# Patient Record
Sex: FEMALE | Race: WHITE | Employment: OTHER | URBAN - METROPOLITAN AREA
[De-identification: names, ages, dates, MRNs, and addresses within clinical notes are randomized per-mention and may not be internally consistent; named-entity substitution may affect disease eponyms.]

---

## 2017-01-03 ENCOUNTER — OFFICE VISIT (OUTPATIENT)
Dept: UROLOGY | Facility: HOSPITAL | Age: 57
End: 2017-01-03
Attending: OBSTETRICS & GYNECOLOGY

## 2017-01-03 VITALS
SYSTOLIC BLOOD PRESSURE: 132 MMHG | WEIGHT: 175 LBS | DIASTOLIC BLOOD PRESSURE: 84 MMHG | BODY MASS INDEX: 27.79 KG/M2 | HEIGHT: 66.5 IN

## 2017-01-03 DIAGNOSIS — N30.10 CHRONIC INTERSTITIAL CYSTITIS: Primary | ICD-10-CM

## 2017-01-03 LAB
BLOOD URINE: NEGATIVE
CONTROL RUN WITHIN 24 HOURS?: YES
LEUKOCYTE ESTERASE URINE: NEGATIVE
NITRITE URINE: NEGATIVE

## 2017-01-03 PROCEDURE — 51700 IRRIGATION OF BLADDER: CPT

## 2017-01-03 PROCEDURE — 81002 URINALYSIS NONAUTO W/O SCOPE: CPT

## 2017-01-03 RX ORDER — 0.9 % SODIUM CHLORIDE 0.9 %
17 VIAL (ML) INJECTION ONCE
Status: COMPLETED | OUTPATIENT
Start: 2017-01-03 | End: 2017-01-03

## 2017-01-03 RX ORDER — HEPARIN SODIUM 10000 [USP'U]/ML
40000 INJECTION, SOLUTION INTRAVENOUS; SUBCUTANEOUS ONCE
Status: COMPLETED | OUTPATIENT
Start: 2017-01-03 | End: 2017-01-03

## 2017-01-03 RX ORDER — LIDOCAINE HYDROCHLORIDE 20 MG/ML
10 JELLY TOPICAL ONCE
Status: COMPLETED | OUTPATIENT
Start: 2017-01-03 | End: 2017-01-03

## 2017-01-03 RX ADMIN — HEPARIN SODIUM 40000 UNITS: 10000 INJECTION, SOLUTION INTRAVENOUS; SUBCUTANEOUS at 14:04:00

## 2017-01-03 RX ADMIN — 0.9 % SODIUM CHLORIDE 17 ML: 0.9 % VIAL (ML) INJECTION at 14:04:00

## 2017-01-03 RX ADMIN — LIDOCAINE HYDROCHLORIDE 10 ML: 20 JELLY TOPICAL at 14:04:00

## 2017-01-03 NOTE — PROCEDURES
.Patient here for instill #3. Patient reports feeling bladder pain d/t travel according to pt. Instill given per protocol. Pt felt she did not need to urinate prior to instill. Patient catheterized for 100 ml. Chemstrip performed.   Patient tolerated pro

## 2017-02-03 ENCOUNTER — TELEPHONE (OUTPATIENT)
Dept: UROLOGY | Facility: HOSPITAL | Age: 57
End: 2017-02-03

## 2017-02-03 NOTE — TELEPHONE ENCOUNTER
Pt called today, she was scheduled for instill. Pt states she has had a low grade fever for 12days. She had foot surgery last month. This is her second foot surgery and her foot became infected then. She saw her foot surgeon [facility] Jan 31.  He said her foot

## 2017-02-07 ENCOUNTER — TELEPHONE (OUTPATIENT)
Dept: UROLOGY | Facility: HOSPITAL | Age: 57
End: 2017-02-07

## 2017-02-10 ENCOUNTER — OFFICE VISIT (OUTPATIENT)
Dept: UROLOGY | Facility: HOSPITAL | Age: 57
End: 2017-02-10
Attending: OBSTETRICS & GYNECOLOGY
Payer: COMMERCIAL

## 2017-02-10 VITALS
SYSTOLIC BLOOD PRESSURE: 130 MMHG | DIASTOLIC BLOOD PRESSURE: 82 MMHG | TEMPERATURE: 98 F | BODY MASS INDEX: 27.79 KG/M2 | HEIGHT: 66.5 IN | WEIGHT: 175 LBS

## 2017-02-10 DIAGNOSIS — N30.10 CHRONIC INTERSTITIAL CYSTITIS: Primary | ICD-10-CM

## 2017-02-10 PROCEDURE — 81002 URINALYSIS NONAUTO W/O SCOPE: CPT

## 2017-02-10 PROCEDURE — 51700 IRRIGATION OF BLADDER: CPT

## 2017-02-10 RX ORDER — 0.9 % SODIUM CHLORIDE 0.9 %
17 VIAL (ML) INJECTION ONCE
Status: COMPLETED | OUTPATIENT
Start: 2017-02-10 | End: 2017-02-10

## 2017-02-10 RX ORDER — LIDOCAINE HYDROCHLORIDE 20 MG/ML
10 JELLY TOPICAL ONCE
Status: COMPLETED | OUTPATIENT
Start: 2017-02-10 | End: 2017-02-10

## 2017-02-10 RX ORDER — HEPARIN SODIUM 10000 [USP'U]/ML
40000 INJECTION, SOLUTION INTRAVENOUS; SUBCUTANEOUS ONCE
Status: COMPLETED | OUTPATIENT
Start: 2017-02-10 | End: 2017-02-10

## 2017-02-10 RX ADMIN — LIDOCAINE HYDROCHLORIDE 10 ML: 20 JELLY TOPICAL at 14:20:00

## 2017-02-10 RX ADMIN — 0.9 % SODIUM CHLORIDE 17 ML: 0.9 % VIAL (ML) INJECTION at 14:20:00

## 2017-02-10 RX ADMIN — HEPARIN SODIUM 40000 UNITS: 10000 INJECTION, SOLUTION INTRAVENOUS; SUBCUTANEOUS at 14:20:00

## 2017-02-10 NOTE — PROCEDURES
.Patient here for rescue instill. Patient reports feeling bladder pain. She thought she had a UTI 2 wks ago, but urine culture was neg. Pt recently had foot surgery and has a boot device on her R foot. She has limited mobility as she cannot bear  Weight.

## 2017-03-03 ENCOUNTER — OFFICE VISIT (OUTPATIENT)
Dept: UROLOGY | Facility: HOSPITAL | Age: 57
End: 2017-03-03
Attending: OBSTETRICS & GYNECOLOGY
Payer: COMMERCIAL

## 2017-03-03 VITALS — BODY MASS INDEX: 27.64 KG/M2 | WEIGHT: 174 LBS | HEIGHT: 66.5 IN | RESPIRATION RATE: 16 BRPM

## 2017-03-03 DIAGNOSIS — N95.2 POSTMENOPAUSAL ATROPHIC VAGINITIS: ICD-10-CM

## 2017-03-03 DIAGNOSIS — R30.0 DYSURIA: ICD-10-CM

## 2017-03-03 DIAGNOSIS — R39.15 URGENCY OF URINATION: ICD-10-CM

## 2017-03-03 DIAGNOSIS — N30.10 CHRONIC INTERSTITIAL CYSTITIS: Primary | ICD-10-CM

## 2017-03-03 PROCEDURE — 81002 URINALYSIS NONAUTO W/O SCOPE: CPT

## 2017-03-03 PROCEDURE — 51700 IRRIGATION OF BLADDER: CPT

## 2017-03-03 RX ORDER — 0.9 % SODIUM CHLORIDE 0.9 %
17 VIAL (ML) INJECTION ONCE
Status: COMPLETED | OUTPATIENT
Start: 2017-03-03 | End: 2017-03-03

## 2017-03-03 RX ORDER — HEPARIN SODIUM 10000 [USP'U]/ML
40000 INJECTION, SOLUTION INTRAVENOUS; SUBCUTANEOUS ONCE
Status: COMPLETED | OUTPATIENT
Start: 2017-03-03 | End: 2017-03-03

## 2017-03-03 RX ORDER — LIDOCAINE HYDROCHLORIDE 20 MG/ML
10 JELLY TOPICAL ONCE
Status: COMPLETED | OUTPATIENT
Start: 2017-03-03 | End: 2017-03-03

## 2017-03-03 RX ADMIN — HEPARIN SODIUM 40000 UNITS: 10000 INJECTION, SOLUTION INTRAVENOUS; SUBCUTANEOUS at 14:16:00

## 2017-03-03 RX ADMIN — 0.9 % SODIUM CHLORIDE 17 ML: 0.9 % VIAL (ML) INJECTION at 14:16:00

## 2017-03-03 RX ADMIN — LIDOCAINE HYDROCHLORIDE 10 ML: 20 JELLY TOPICAL at 14:16:00

## 2017-03-03 NOTE — PROCEDURES
..Patient here for instill . Patient reports feeling continued urgency and frequency. Patient only using vaginal valium prn and has not used Estrace cream for a while, encouraged pt to resume Estrace cream. Instill given per protocol.   Patient catheterize

## 2017-03-16 ENCOUNTER — OFFICE VISIT (OUTPATIENT)
Dept: UROLOGY | Facility: HOSPITAL | Age: 57
End: 2017-03-16
Attending: OBSTETRICS & GYNECOLOGY
Payer: COMMERCIAL

## 2017-03-16 VITALS
BODY MASS INDEX: 27.64 KG/M2 | DIASTOLIC BLOOD PRESSURE: 82 MMHG | HEIGHT: 66.5 IN | SYSTOLIC BLOOD PRESSURE: 122 MMHG | WEIGHT: 174 LBS

## 2017-03-16 DIAGNOSIS — N30.10 CHRONIC INTERSTITIAL CYSTITIS: Primary | ICD-10-CM

## 2017-03-16 PROCEDURE — 81002 URINALYSIS NONAUTO W/O SCOPE: CPT

## 2017-03-16 PROCEDURE — 51700 IRRIGATION OF BLADDER: CPT

## 2017-03-16 RX ORDER — GABAPENTIN 300 MG/1
300 CAPSULE ORAL NIGHTLY
COMMUNITY
End: 2017-04-27

## 2017-03-16 RX ORDER — 0.9 % SODIUM CHLORIDE 0.9 %
17 VIAL (ML) INJECTION ONCE
Status: COMPLETED | OUTPATIENT
Start: 2017-03-16 | End: 2017-03-16

## 2017-03-16 RX ORDER — LIDOCAINE HYDROCHLORIDE 20 MG/ML
10 JELLY TOPICAL ONCE
Status: COMPLETED | OUTPATIENT
Start: 2017-03-16 | End: 2017-03-16

## 2017-03-16 RX ORDER — HEPARIN SODIUM 10000 [USP'U]/ML
40000 INJECTION, SOLUTION INTRAVENOUS; SUBCUTANEOUS ONCE
Status: COMPLETED | OUTPATIENT
Start: 2017-03-16 | End: 2017-03-16

## 2017-03-16 RX ADMIN — HEPARIN SODIUM 40000 UNITS: 10000 INJECTION, SOLUTION INTRAVENOUS; SUBCUTANEOUS at 13:34:00

## 2017-03-16 RX ADMIN — LIDOCAINE HYDROCHLORIDE 10 ML: 20 JELLY TOPICAL at 13:35:00

## 2017-03-16 RX ADMIN — 0.9 % SODIUM CHLORIDE 17 ML: 0.9 % VIAL (ML) INJECTION at 13:35:00

## 2017-03-16 NOTE — PROCEDURES
.Patient here for rescue instill. Patient reports feeling good for 4 -5 days after last instill. Feels she is not emptying completely, otherwise pretty good, suffering from migraine today. Instill given per protocol.   Patient catheterized for a residual

## 2017-03-23 ENCOUNTER — OFFICE VISIT (OUTPATIENT)
Dept: UROLOGY | Facility: HOSPITAL | Age: 57
End: 2017-03-23
Attending: OBSTETRICS & GYNECOLOGY
Payer: COMMERCIAL

## 2017-03-23 VITALS
SYSTOLIC BLOOD PRESSURE: 140 MMHG | HEIGHT: 66.5 IN | DIASTOLIC BLOOD PRESSURE: 80 MMHG | WEIGHT: 174 LBS | BODY MASS INDEX: 27.64 KG/M2

## 2017-03-23 DIAGNOSIS — N30.10 CHRONIC INTERSTITIAL CYSTITIS: Primary | ICD-10-CM

## 2017-03-23 PROCEDURE — 51700 IRRIGATION OF BLADDER: CPT

## 2017-03-23 PROCEDURE — 81002 URINALYSIS NONAUTO W/O SCOPE: CPT

## 2017-03-23 RX ORDER — HEPARIN SODIUM 10000 [USP'U]/ML
40000 INJECTION, SOLUTION INTRAVENOUS; SUBCUTANEOUS ONCE
Status: COMPLETED | OUTPATIENT
Start: 2017-03-23 | End: 2017-03-23

## 2017-03-23 RX ORDER — 0.9 % SODIUM CHLORIDE 0.9 %
17 VIAL (ML) INJECTION ONCE
Status: COMPLETED | OUTPATIENT
Start: 2017-03-23 | End: 2017-03-23

## 2017-03-23 RX ORDER — LIDOCAINE HYDROCHLORIDE 20 MG/ML
10 JELLY TOPICAL ONCE
Status: COMPLETED | OUTPATIENT
Start: 2017-03-23 | End: 2017-03-23

## 2017-03-23 RX ADMIN — HEPARIN SODIUM 40000 UNITS: 10000 INJECTION, SOLUTION INTRAVENOUS; SUBCUTANEOUS at 14:21:00

## 2017-03-23 RX ADMIN — LIDOCAINE HYDROCHLORIDE 10 ML: 20 JELLY TOPICAL at 14:21:00

## 2017-03-23 RX ADMIN — 0.9 % SODIUM CHLORIDE 17 ML: 0.9 % VIAL (ML) INJECTION at 14:21:00

## 2017-03-23 NOTE — PROCEDURES
.Patient here for rescue instill. Patient reports feeling good. Had 5 good days. Instill given per protocol. Patient catheterized for a residual of 50 ml. Chemstrip performed. Patient tolerated procedure well. Next instill scheduled for 1 week.

## 2017-04-03 ENCOUNTER — OFFICE VISIT (OUTPATIENT)
Dept: UROLOGY | Facility: HOSPITAL | Age: 57
End: 2017-04-03
Attending: OBSTETRICS & GYNECOLOGY
Payer: COMMERCIAL

## 2017-04-03 VITALS
WEIGHT: 174 LBS | HEIGHT: 66.5 IN | BODY MASS INDEX: 27.64 KG/M2 | SYSTOLIC BLOOD PRESSURE: 148 MMHG | DIASTOLIC BLOOD PRESSURE: 86 MMHG

## 2017-04-03 DIAGNOSIS — N30.10 INTERSTITIAL CYSTITIS: ICD-10-CM

## 2017-04-03 DIAGNOSIS — N30.10 CHRONIC INTERSTITIAL CYSTITIS: Primary | ICD-10-CM

## 2017-04-03 PROCEDURE — 51700 IRRIGATION OF BLADDER: CPT

## 2017-04-03 PROCEDURE — 81002 URINALYSIS NONAUTO W/O SCOPE: CPT

## 2017-04-03 RX ORDER — 0.9 % SODIUM CHLORIDE 0.9 %
17 VIAL (ML) INJECTION ONCE
Status: COMPLETED | OUTPATIENT
Start: 2017-04-03 | End: 2017-04-03

## 2017-04-03 RX ORDER — LIDOCAINE HYDROCHLORIDE 20 MG/ML
10 JELLY TOPICAL ONCE
Status: COMPLETED | OUTPATIENT
Start: 2017-04-03 | End: 2017-04-03

## 2017-04-03 RX ORDER — HEPARIN SODIUM 10000 [USP'U]/ML
40000 INJECTION, SOLUTION INTRAVENOUS; SUBCUTANEOUS ONCE
Status: COMPLETED | OUTPATIENT
Start: 2017-04-03 | End: 2017-04-03

## 2017-04-03 RX ADMIN — LIDOCAINE HYDROCHLORIDE 10 ML: 20 JELLY TOPICAL at 14:22:00

## 2017-04-03 RX ADMIN — 0.9 % SODIUM CHLORIDE 17 ML: 0.9 % VIAL (ML) INJECTION at 14:22:00

## 2017-04-03 RX ADMIN — HEPARIN SODIUM 40000 UNITS: 10000 INJECTION, SOLUTION INTRAVENOUS; SUBCUTANEOUS at 14:22:00

## 2017-04-03 NOTE — PROCEDURES
.Patient here for rescue instill . Patient reports feeling better last week for several days after instill. Instill given per protocol. Patient catheterized for a residual of 5 ml. Chemstrip performed. Patient tolerated procedure well.   Next instill s

## 2017-04-27 ENCOUNTER — OFFICE VISIT (OUTPATIENT)
Dept: UROLOGY | Facility: HOSPITAL | Age: 57
End: 2017-04-27
Attending: OBSTETRICS & GYNECOLOGY
Payer: COMMERCIAL

## 2017-04-27 VITALS
WEIGHT: 174 LBS | SYSTOLIC BLOOD PRESSURE: 138 MMHG | DIASTOLIC BLOOD PRESSURE: 78 MMHG | HEIGHT: 66.5 IN | BODY MASS INDEX: 27.64 KG/M2

## 2017-04-27 DIAGNOSIS — N30.10 CHRONIC INTERSTITIAL CYSTITIS: Primary | ICD-10-CM

## 2017-04-27 PROCEDURE — 81002 URINALYSIS NONAUTO W/O SCOPE: CPT

## 2017-04-27 PROCEDURE — 51700 IRRIGATION OF BLADDER: CPT

## 2017-04-27 RX ORDER — 0.9 % SODIUM CHLORIDE 0.9 %
17 VIAL (ML) INJECTION ONCE
Status: COMPLETED | OUTPATIENT
Start: 2017-04-27 | End: 2017-04-27

## 2017-04-27 RX ORDER — HEPARIN SODIUM 10000 [USP'U]/ML
40000 INJECTION, SOLUTION INTRAVENOUS; SUBCUTANEOUS ONCE
Status: COMPLETED | OUTPATIENT
Start: 2017-04-27 | End: 2017-04-27

## 2017-04-27 RX ORDER — PROPRANOLOL HYDROCHLORIDE 20 MG/1
20 TABLET ORAL 3 TIMES DAILY PRN
COMMUNITY
End: 2017-08-10

## 2017-04-27 RX ORDER — QUETIAPINE 50 MG/1
50 TABLET, FILM COATED ORAL NIGHTLY
COMMUNITY
End: 2017-06-12

## 2017-04-27 RX ORDER — LIDOCAINE HYDROCHLORIDE 20 MG/ML
10 JELLY TOPICAL ONCE
Status: COMPLETED | OUTPATIENT
Start: 2017-04-27 | End: 2017-04-27

## 2017-04-27 RX ADMIN — 0.9 % SODIUM CHLORIDE 17 ML: 0.9 % VIAL (ML) INJECTION at 13:30:00

## 2017-04-27 RX ADMIN — LIDOCAINE HYDROCHLORIDE 10 ML: 20 JELLY TOPICAL at 13:30:00

## 2017-04-27 RX ADMIN — HEPARIN SODIUM 40000 UNITS: 10000 INJECTION, SOLUTION INTRAVENOUS; SUBCUTANEOUS at 13:30:00

## 2017-04-27 NOTE — PROCEDURES
.Patient here for rescue instill. Patient reports feeling her continued chronic pain. Instill given per protocol. Patient catheterized for a residual of 5 ml. Chemstrip performed. Patient tolerated procedure well. Next instill scheduled for 2 weeks.

## 2017-05-08 ENCOUNTER — LAB ENCOUNTER (OUTPATIENT)
Dept: LAB | Age: 57
End: 2017-05-08
Attending: FAMILY MEDICINE
Payer: COMMERCIAL

## 2017-05-08 ENCOUNTER — OFFICE VISIT (OUTPATIENT)
Dept: FAMILY MEDICINE CLINIC | Facility: CLINIC | Age: 57
End: 2017-05-08

## 2017-05-08 VITALS
TEMPERATURE: 98 F | HEART RATE: 106 BPM | HEIGHT: 65.5 IN | RESPIRATION RATE: 16 BRPM | SYSTOLIC BLOOD PRESSURE: 144 MMHG | BODY MASS INDEX: 30.29 KG/M2 | OXYGEN SATURATION: 97 % | DIASTOLIC BLOOD PRESSURE: 80 MMHG | WEIGHT: 184 LBS

## 2017-05-08 DIAGNOSIS — Z13.1 SCREENING FOR DIABETES MELLITUS: ICD-10-CM

## 2017-05-08 DIAGNOSIS — N30.10 CHRONIC INTERSTITIAL CYSTITIS: ICD-10-CM

## 2017-05-08 DIAGNOSIS — Z13.21 ENCOUNTER FOR VITAMIN DEFICIENCY SCREENING: ICD-10-CM

## 2017-05-08 DIAGNOSIS — F41.9 ANXIETY: ICD-10-CM

## 2017-05-08 DIAGNOSIS — G43.009 MIGRAINE WITHOUT AURA AND WITHOUT STATUS MIGRAINOSUS, NOT INTRACTABLE: ICD-10-CM

## 2017-05-08 DIAGNOSIS — Z13.220 SCREENING, LIPID: ICD-10-CM

## 2017-05-08 DIAGNOSIS — M25.549 PAIN IN MULTIPLE FINGER JOINTS: ICD-10-CM

## 2017-05-08 DIAGNOSIS — M25.549 PAIN IN MULTIPLE FINGER JOINTS: Primary | ICD-10-CM

## 2017-05-08 DIAGNOSIS — Z13.0 SCREENING, ANEMIA, DEFICIENCY, IRON: ICD-10-CM

## 2017-05-08 DIAGNOSIS — E89.0 POST-SURGICAL HYPOTHYROIDISM: ICD-10-CM

## 2017-05-08 DIAGNOSIS — Z00.00 BLOOD TESTS FOR ROUTINE GENERAL PHYSICAL EXAMINATION: ICD-10-CM

## 2017-05-08 PROCEDURE — 99214 OFFICE O/P EST MOD 30 MIN: CPT | Performed by: FAMILY MEDICINE

## 2017-05-08 PROCEDURE — 82306 VITAMIN D 25 HYDROXY: CPT | Performed by: FAMILY MEDICINE

## 2017-05-08 PROCEDURE — 82607 VITAMIN B-12: CPT | Performed by: FAMILY MEDICINE

## 2017-05-08 PROCEDURE — 86140 C-REACTIVE PROTEIN: CPT | Performed by: FAMILY MEDICINE

## 2017-05-08 PROCEDURE — 80050 GENERAL HEALTH PANEL: CPT | Performed by: FAMILY MEDICINE

## 2017-05-08 PROCEDURE — 85652 RBC SED RATE AUTOMATED: CPT | Performed by: FAMILY MEDICINE

## 2017-05-08 PROCEDURE — 36415 COLL VENOUS BLD VENIPUNCTURE: CPT | Performed by: FAMILY MEDICINE

## 2017-05-08 PROCEDURE — 84439 ASSAY OF FREE THYROXINE: CPT | Performed by: FAMILY MEDICINE

## 2017-05-08 PROCEDURE — 86200 CCP ANTIBODY: CPT | Performed by: FAMILY MEDICINE

## 2017-05-08 PROCEDURE — 86038 ANTINUCLEAR ANTIBODIES: CPT | Performed by: FAMILY MEDICINE

## 2017-05-08 PROCEDURE — 86431 RHEUMATOID FACTOR QUANT: CPT | Performed by: FAMILY MEDICINE

## 2017-05-08 NOTE — PROGRESS NOTES
Sherice Henning is a 65year old female. S:  Patient presents today with the following concerns:  · Depressed. Seeing therapist and psychiatrist.  Is tearful.  was just in hospital with intestinal blockage. Having marital issues.   Youngest son le tongue daily as needed.  Indications: Interstitial cystitis Disp:  Rfl:      Patient Active Problem List:     Chronic interstitial cystitis     HTN (hypertension)     Urinary urgency     Post-surgical hypothyroidism     Migraines     Asthma     Menopausal s palpation. Normal ROM. Right 5th digit DIP is not swollen but is tender to palpation. Squeeze test is negative.     EXTREMITIES: no edema  NEURO: Oriented times three,cranial nerves are intact,motor and sensory are grossly intact    ASSESSMENT AND PLAN: [phone]

## 2017-05-10 ENCOUNTER — OFFICE VISIT (OUTPATIENT)
Dept: UROLOGY | Facility: HOSPITAL | Age: 57
End: 2017-05-10
Attending: OBSTETRICS & GYNECOLOGY
Payer: COMMERCIAL

## 2017-05-10 VITALS
DIASTOLIC BLOOD PRESSURE: 88 MMHG | SYSTOLIC BLOOD PRESSURE: 130 MMHG | WEIGHT: 184 LBS | HEIGHT: 65.5 IN | BODY MASS INDEX: 30.29 KG/M2

## 2017-05-10 DIAGNOSIS — N30.10 CHRONIC INTERSTITIAL CYSTITIS: Primary | ICD-10-CM

## 2017-05-10 PROCEDURE — 51700 IRRIGATION OF BLADDER: CPT

## 2017-05-10 PROCEDURE — 81002 URINALYSIS NONAUTO W/O SCOPE: CPT

## 2017-05-10 RX ORDER — 0.9 % SODIUM CHLORIDE 0.9 %
17 VIAL (ML) INJECTION ONCE
Status: COMPLETED | OUTPATIENT
Start: 2017-05-10 | End: 2017-05-10

## 2017-05-10 RX ORDER — HEPARIN SODIUM 10000 [USP'U]/ML
40000 INJECTION, SOLUTION INTRAVENOUS; SUBCUTANEOUS ONCE
Status: COMPLETED | OUTPATIENT
Start: 2017-05-10 | End: 2017-05-10

## 2017-05-10 RX ORDER — LIDOCAINE HYDROCHLORIDE 20 MG/ML
10 JELLY TOPICAL ONCE
Status: COMPLETED | OUTPATIENT
Start: 2017-05-10 | End: 2017-05-10

## 2017-05-10 RX ADMIN — HEPARIN SODIUM 40000 UNITS: 10000 INJECTION, SOLUTION INTRAVENOUS; SUBCUTANEOUS at 13:45:00

## 2017-05-10 RX ADMIN — LIDOCAINE HYDROCHLORIDE 10 ML: 20 JELLY TOPICAL at 13:45:00

## 2017-05-10 RX ADMIN — 0.9 % SODIUM CHLORIDE 17 ML: 0.9 % VIAL (ML) INJECTION at 13:45:00

## 2017-05-10 NOTE — PROCEDURES
.Patient here for instill. Patient reports feeling . Instill given per protocol. Patient catheterized for a residual of 100ml, but pt hadn't emptied prior. Chemstrip performed. Patient tolerated procedure well. Next instill scheduled for PRN basis.  P

## 2017-05-12 ENCOUNTER — OFFICE VISIT (OUTPATIENT)
Dept: FAMILY MEDICINE CLINIC | Facility: CLINIC | Age: 57
End: 2017-05-12

## 2017-05-12 VITALS
DIASTOLIC BLOOD PRESSURE: 90 MMHG | BODY MASS INDEX: 30.84 KG/M2 | HEIGHT: 65.5 IN | WEIGHT: 187.38 LBS | TEMPERATURE: 98 F | RESPIRATION RATE: 18 BRPM | SYSTOLIC BLOOD PRESSURE: 140 MMHG | OXYGEN SATURATION: 97 % | HEART RATE: 107 BPM

## 2017-05-12 DIAGNOSIS — M79.645 PAIN IN FINGER OF BOTH HANDS: ICD-10-CM

## 2017-05-12 DIAGNOSIS — Z12.11 ENCOUNTER FOR SCREENING COLONOSCOPY: ICD-10-CM

## 2017-05-12 DIAGNOSIS — M79.644 PAIN IN FINGER OF BOTH HANDS: ICD-10-CM

## 2017-05-12 DIAGNOSIS — Z01.419 WELL WOMAN EXAM: Primary | ICD-10-CM

## 2017-05-12 DIAGNOSIS — Z12.31 ENCOUNTER FOR SCREENING MAMMOGRAM FOR BREAST CANCER: ICD-10-CM

## 2017-05-12 PROCEDURE — 99396 PREV VISIT EST AGE 40-64: CPT | Performed by: FAMILY MEDICINE

## 2017-05-12 NOTE — PROGRESS NOTES
:HPI:   Danyell Jimenes is a 65year old female who presents for a complete physical exam.     Patient complains of     Sees psychiatrist Dr. Phoebe Kohli with [facility] in [facility] once a month-next appt 5/23.    Dr. Kimmie Irwin is her therapist (SUBOXONE) 8-2 MG Sublingual SL Tab Place 1 tablet under the tongue daily as needed.  Indications: Interstitial cystitis Disp:  Rfl:       Past Medical History   Diagnosis Date   • HYPERTENSION    • Papillary thyroid carcinoma ([facility] [address] 2007   • DEPRESSION    • Alcohol Use: No              Occ: homemaker, used to work for [employer]. : . Children: 2 sons. Exercise: biking. Riding stationary bike right now. Due to issues with foot surgery. Diet: fruits. Not good about getting protein in. and sensory are grossly intact    CBC  (most recent labs)     Lab Results  Component Value Date/Time   WBC 8.0 05/08/2017 03:15 PM   HGB 12.8 05/08/2017 03:15 PM   HCT 39.9 05/08/2017 03:15 PM   .0 05/08/2017 03:15 PM         CMP  (most recent labs) patient indicates understanding of these issues and agrees to the plan. The patient is asked to return for CPX annually and sooner if needed.

## 2017-05-16 ENCOUNTER — TELEPHONE (OUTPATIENT)
Dept: UROLOGY | Facility: HOSPITAL | Age: 57
End: 2017-05-16

## 2017-05-16 NOTE — TELEPHONE ENCOUNTER
Pt called  Requesting we send her letter for insurance approval for Botox.   Spoke to Ester atkinson RN, who states that paperwork was submitted May 12 th or  15th at the latest, our office spoke with Susana Nuñez who was contacting [facility] [phone] to put pre-certification in w

## 2017-05-17 ENCOUNTER — HOSPITAL ENCOUNTER (OUTPATIENT)
Dept: GENERAL RADIOLOGY | Facility: HOSPITAL | Age: 57
Discharge: HOME | End: 2017-05-17
Attending: FAMILY MEDICINE
Payer: COMMERCIAL

## 2017-05-17 DIAGNOSIS — M79.644 PAIN IN FINGER OF BOTH HANDS: ICD-10-CM

## 2017-05-17 DIAGNOSIS — M79.645 PAIN IN FINGER OF BOTH HANDS: ICD-10-CM

## 2017-05-17 PROCEDURE — 73130 X-RAY EXAM OF HAND: CPT | Performed by: FAMILY MEDICINE

## 2017-05-18 ENCOUNTER — OFFICE VISIT (OUTPATIENT)
Dept: UROLOGY | Facility: HOSPITAL | Age: 57
End: 2017-05-18
Attending: OBSTETRICS & GYNECOLOGY
Payer: COMMERCIAL

## 2017-05-18 VITALS
BODY MASS INDEX: 30.78 KG/M2 | WEIGHT: 187 LBS | SYSTOLIC BLOOD PRESSURE: 140 MMHG | HEIGHT: 65.5 IN | DIASTOLIC BLOOD PRESSURE: 82 MMHG

## 2017-05-18 DIAGNOSIS — M79.641 PAIN IN BOTH HANDS: ICD-10-CM

## 2017-05-18 DIAGNOSIS — M79.642 PAIN IN BOTH HANDS: ICD-10-CM

## 2017-05-18 DIAGNOSIS — M19.90 ARTHRITIS: Primary | ICD-10-CM

## 2017-05-18 DIAGNOSIS — R39.15 URGENCY OF URINATION: ICD-10-CM

## 2017-05-18 DIAGNOSIS — N30.10 CHRONIC INTERSTITIAL CYSTITIS: Primary | ICD-10-CM

## 2017-05-18 DIAGNOSIS — N95.2 POSTMENOPAUSAL ATROPHIC VAGINITIS: ICD-10-CM

## 2017-05-18 PROCEDURE — 81002 URINALYSIS NONAUTO W/O SCOPE: CPT

## 2017-05-18 PROCEDURE — 51700 IRRIGATION OF BLADDER: CPT

## 2017-05-18 RX ORDER — 0.9 % SODIUM CHLORIDE 0.9 %
17 VIAL (ML) INJECTION ONCE
Status: COMPLETED | OUTPATIENT
Start: 2017-05-18 | End: 2017-05-18

## 2017-05-18 RX ORDER — LIDOCAINE HYDROCHLORIDE 20 MG/ML
10 JELLY TOPICAL ONCE
Status: COMPLETED | OUTPATIENT
Start: 2017-05-18 | End: 2017-05-18

## 2017-05-18 RX ORDER — ESTRADIOL 0.1 MG/G
CREAM VAGINAL
Qty: 1 TUBE | Refills: 3 | Status: SHIPPED | OUTPATIENT
Start: 2017-05-18 | End: 2019-01-02

## 2017-05-18 RX ORDER — HEPARIN SODIUM 10000 [USP'U]/ML
40000 INJECTION, SOLUTION INTRAVENOUS; SUBCUTANEOUS ONCE
Status: COMPLETED | OUTPATIENT
Start: 2017-05-18 | End: 2017-05-18

## 2017-05-18 RX ADMIN — 0.9 % SODIUM CHLORIDE 17 ML: 0.9 % VIAL (ML) INJECTION at 14:00:00

## 2017-05-18 RX ADMIN — HEPARIN SODIUM 40000 UNITS: 10000 INJECTION, SOLUTION INTRAVENOUS; SUBCUTANEOUS at 14:00:00

## 2017-05-18 RX ADMIN — LIDOCAINE HYDROCHLORIDE 10 ML: 20 JELLY TOPICAL at 14:00:00

## 2017-05-18 NOTE — PROCEDURES
.Patient here for instill. Patient reports feeling dehydrated and having bladder pain. Instill given per protocol. Patient catheterized for a residual of 2 ml. Chemstrip performed. Patient tolerated procedure well.   Next instill scheduled for next wee

## 2017-05-25 ENCOUNTER — OFFICE VISIT (OUTPATIENT)
Dept: UROLOGY | Facility: HOSPITAL | Age: 57
End: 2017-05-25
Attending: OBSTETRICS & GYNECOLOGY
Payer: COMMERCIAL

## 2017-05-25 VITALS
DIASTOLIC BLOOD PRESSURE: 78 MMHG | HEIGHT: 65.5 IN | WEIGHT: 187 LBS | BODY MASS INDEX: 30.78 KG/M2 | SYSTOLIC BLOOD PRESSURE: 135 MMHG

## 2017-05-25 DIAGNOSIS — N95.2 POSTMENOPAUSAL ATROPHIC VAGINITIS: ICD-10-CM

## 2017-05-25 DIAGNOSIS — N30.10 CHRONIC INTERSTITIAL CYSTITIS: Primary | ICD-10-CM

## 2017-05-25 PROCEDURE — 81002 URINALYSIS NONAUTO W/O SCOPE: CPT

## 2017-05-25 PROCEDURE — 51700 IRRIGATION OF BLADDER: CPT

## 2017-05-25 RX ORDER — HEPARIN SODIUM 10000 [USP'U]/ML
40000 INJECTION, SOLUTION INTRAVENOUS; SUBCUTANEOUS ONCE
Status: COMPLETED | OUTPATIENT
Start: 2017-05-25 | End: 2017-05-25

## 2017-05-25 RX ORDER — LIDOCAINE HYDROCHLORIDE 20 MG/ML
10 JELLY TOPICAL ONCE
Status: COMPLETED | OUTPATIENT
Start: 2017-05-25 | End: 2017-05-25

## 2017-05-25 RX ORDER — 0.9 % SODIUM CHLORIDE 0.9 %
17 VIAL (ML) INJECTION ONCE
Status: COMPLETED | OUTPATIENT
Start: 2017-05-25 | End: 2017-05-25

## 2017-05-25 RX ADMIN — HEPARIN SODIUM 40000 UNITS: 10000 INJECTION, SOLUTION INTRAVENOUS; SUBCUTANEOUS at 14:00:00

## 2017-05-25 RX ADMIN — 0.9 % SODIUM CHLORIDE 17 ML: 0.9 % VIAL (ML) INJECTION at 14:00:00

## 2017-05-25 RX ADMIN — LIDOCAINE HYDROCHLORIDE 10 ML: 20 JELLY TOPICAL at 14:00:00

## 2017-05-25 NOTE — PROCEDURES
.Patient here for instill. Patient reports feeling same ongoing bladder and vaginal pain. Instill given per protocol. Patient catheterized for a residual of 3 ml. Chemstrip performed. Patient tolerated procedure well.   Next instill scheduled for 1 wee

## 2017-06-02 ENCOUNTER — OFFICE VISIT (OUTPATIENT)
Dept: UROLOGY | Facility: HOSPITAL | Age: 57
End: 2017-06-02
Attending: OBSTETRICS & GYNECOLOGY
Payer: COMMERCIAL

## 2017-06-02 VITALS — HEIGHT: 65.5 IN | BODY MASS INDEX: 30.78 KG/M2 | WEIGHT: 187 LBS

## 2017-06-02 DIAGNOSIS — N30.10 CHRONIC INTERSTITIAL CYSTITIS: Primary | ICD-10-CM

## 2017-06-02 PROCEDURE — 51700 IRRIGATION OF BLADDER: CPT

## 2017-06-02 PROCEDURE — 81002 URINALYSIS NONAUTO W/O SCOPE: CPT

## 2017-06-02 RX ORDER — HEPARIN SODIUM 10000 [USP'U]/ML
40000 INJECTION, SOLUTION INTRAVENOUS; SUBCUTANEOUS ONCE
Status: COMPLETED | OUTPATIENT
Start: 2017-06-02 | End: 2017-06-02

## 2017-06-02 RX ORDER — 0.9 % SODIUM CHLORIDE 0.9 %
17 VIAL (ML) INJECTION ONCE
Status: COMPLETED | OUTPATIENT
Start: 2017-06-02 | End: 2017-06-02

## 2017-06-02 RX ORDER — LIDOCAINE HYDROCHLORIDE 20 MG/ML
10 JELLY TOPICAL ONCE
Status: COMPLETED | OUTPATIENT
Start: 2017-06-02 | End: 2017-06-02

## 2017-06-02 RX ADMIN — LIDOCAINE HYDROCHLORIDE 10 ML: 20 JELLY TOPICAL at 14:00:00

## 2017-06-02 RX ADMIN — 0.9 % SODIUM CHLORIDE 17 ML: 0.9 % VIAL (ML) INJECTION at 14:00:00

## 2017-06-02 RX ADMIN — HEPARIN SODIUM 40000 UNITS: 10000 INJECTION, SOLUTION INTRAVENOUS; SUBCUTANEOUS at 14:00:00

## 2017-06-02 NOTE — PROCEDURES
.Patient here for instill. Patient reports feeling same pain. Instill given per protocol. Patient catheterized for a residual of 3 ml. Chemstrip performed. Patient tolerated procedure well. Next instill scheduled for 1 1/2 wks.

## 2017-06-08 ENCOUNTER — OFFICE VISIT (OUTPATIENT)
Dept: FAMILY MEDICINE CLINIC | Facility: CLINIC | Age: 57
End: 2017-06-08

## 2017-06-08 ENCOUNTER — TELEPHONE (OUTPATIENT)
Dept: FAMILY MEDICINE CLINIC | Facility: CLINIC | Age: 57
End: 2017-06-08

## 2017-06-08 VITALS
TEMPERATURE: 98 F | RESPIRATION RATE: 16 BRPM | DIASTOLIC BLOOD PRESSURE: 98 MMHG | SYSTOLIC BLOOD PRESSURE: 150 MMHG | HEART RATE: 88 BPM

## 2017-06-08 DIAGNOSIS — J45.20 MILD INTERMITTENT ASTHMA WITHOUT COMPLICATION: ICD-10-CM

## 2017-06-08 DIAGNOSIS — R11.0 NAUSEA: ICD-10-CM

## 2017-06-08 DIAGNOSIS — R42 DIZZINESS: Primary | ICD-10-CM

## 2017-06-08 DIAGNOSIS — G43.009 MIGRAINE WITHOUT AURA AND WITHOUT STATUS MIGRAINOSUS, NOT INTRACTABLE: ICD-10-CM

## 2017-06-08 PROCEDURE — 99214 OFFICE O/P EST MOD 30 MIN: CPT | Performed by: PHYSICIAN ASSISTANT

## 2017-06-08 RX ORDER — ONDANSETRON 4 MG/1
4 TABLET, FILM COATED ORAL EVERY 8 HOURS PRN
Qty: 20 TABLET | Refills: 0 | Status: SHIPPED | OUTPATIENT
Start: 2017-06-08 | End: 2017-08-10

## 2017-06-08 RX ORDER — MECLIZINE HYDROCHLORIDE 25 MG/1
25 TABLET ORAL 3 TIMES DAILY PRN
Qty: 21 TABLET | Refills: 0 | Status: SHIPPED | OUTPATIENT
Start: 2017-06-08 | End: 2017-08-10

## 2017-06-08 NOTE — PROGRESS NOTES
CC:  Patient presents with:  Dizziness: Began after getting up from lying down yesterday afternoon. Room is spinning. Has been constant since then. Migraine: HA beginning last night.   Asthma: ACT Score 25/25  AAP updated and pended  Does not have a rescue only  Fentanyl                  Hydrocodone                 Comment:Caused significant depressive symptoms  Pcn [Penicillamine]     Rash  Verapamil                   Comment:Migraine  Current Meds:    Current Outpatient Prescriptions on File Prior to Visit palpitations; no peripheral edema   Gastrointestinal: Normal bowels; no abdominal pain  Genitourinary:  Normal urination; no hematuria or urgency/frequency   Musculoskeletal: No pain/weakness in arms/legs; normal range of motion   Skin: No rashes or new sk effects of the medication I prescribed. I advised her to seek immediate attention in the ER for intractable vomiting, severe dizziness, severe HA, or other neurological red flags. Return in 3-4 days if symptoms; sooner as needed.   I will refer her to ENT

## 2017-06-08 NOTE — TELEPHONE ENCOUNTER
Pt reports waking up with dizziness x 2 days. Feels a little better after eating. No h/o feeling dizzy. No other sx. Appt given for today with Tee Lew PA-C. Patient verbalized understanding and agrees with plan.

## 2017-06-08 NOTE — PATIENT INSTRUCTIONS
Dizziness (Vertigo) and Balance Problems: Staying Safe     Replace burned-out lightbulbs to keep your home safe and well lit. Falls or accidents can lead to pain, broken bones, and fear of future falls.  Protect yourself and others by preparing for epis · Take public transportation. · Walk to stores and other places when you can. Asking for help  Don't be afraid to ask for help running errands, cooking meals, and doing exercise.  Whether it's a friend, loved one, neighbor, or stranger on the street, eliezer echevarria Syncope is fainting that happens when the brain doesn’t get enough oxygen-rich blood. It can be caused by low heart rate or low blood pressure. This is called vasovagal syncope. It can also be caused by sitting or standing up too quickly.  This is called or

## 2017-06-12 ENCOUNTER — OFFICE VISIT (OUTPATIENT)
Dept: UROLOGY | Facility: HOSPITAL | Age: 57
End: 2017-06-12
Attending: OBSTETRICS & GYNECOLOGY
Payer: COMMERCIAL

## 2017-06-12 VITALS
HEIGHT: 65.5 IN | BODY MASS INDEX: 30.78 KG/M2 | SYSTOLIC BLOOD PRESSURE: 140 MMHG | DIASTOLIC BLOOD PRESSURE: 86 MMHG | WEIGHT: 187 LBS

## 2017-06-12 DIAGNOSIS — N30.10 CHRONIC INTERSTITIAL CYSTITIS: Primary | ICD-10-CM

## 2017-06-12 PROCEDURE — 81002 URINALYSIS NONAUTO W/O SCOPE: CPT

## 2017-06-12 PROCEDURE — 51700 IRRIGATION OF BLADDER: CPT

## 2017-06-12 RX ORDER — HEPARIN SODIUM 10000 [USP'U]/ML
40000 INJECTION, SOLUTION INTRAVENOUS; SUBCUTANEOUS ONCE
Status: COMPLETED | OUTPATIENT
Start: 2017-06-12 | End: 2017-06-12

## 2017-06-12 RX ORDER — TRAZODONE HYDROCHLORIDE 50 MG/1
50 TABLET ORAL NIGHTLY
COMMUNITY
End: 2018-11-30

## 2017-06-12 RX ORDER — DESVENLAFAXINE 50 MG/1
50 TABLET, EXTENDED RELEASE ORAL DAILY
COMMUNITY
End: 2017-08-10

## 2017-06-12 RX ORDER — 0.9 % SODIUM CHLORIDE 0.9 %
17 VIAL (ML) INJECTION ONCE
Status: COMPLETED | OUTPATIENT
Start: 2017-06-12 | End: 2017-06-12

## 2017-06-12 RX ORDER — LIDOCAINE HYDROCHLORIDE 20 MG/ML
10 JELLY TOPICAL ONCE
Status: COMPLETED | OUTPATIENT
Start: 2017-06-12 | End: 2017-06-12

## 2017-06-12 RX ADMIN — 0.9 % SODIUM CHLORIDE 17 ML: 0.9 % VIAL (ML) INJECTION at 14:10:00

## 2017-06-12 RX ADMIN — LIDOCAINE HYDROCHLORIDE 10 ML: 20 JELLY TOPICAL at 14:10:00

## 2017-06-12 RX ADMIN — HEPARIN SODIUM 40000 UNITS: 10000 INJECTION, SOLUTION INTRAVENOUS; SUBCUTANEOUS at 14:10:00

## 2017-06-12 NOTE — PROCEDURES
.Patient here for instill. Patient reports feeling better this week. Instill given per protocol. Patient catheterized for a residual of 5 ml. Chemstrip performed. Patient tolerated procedure well. Next instill scheduled PRN.

## 2017-06-19 ENCOUNTER — TELEPHONE (OUTPATIENT)
Dept: FAMILY MEDICINE CLINIC | Facility: CLINIC | Age: 57
End: 2017-06-19

## 2017-06-21 DIAGNOSIS — I10 ESSENTIAL HYPERTENSION: Primary | ICD-10-CM

## 2017-06-21 RX ORDER — LIOTHYRONINE SODIUM 5 MCG
TABLET ORAL
Qty: 30 TABLET | Refills: 2 | Status: SHIPPED | OUTPATIENT
Start: 2017-06-21 | End: 2017-09-18

## 2017-07-06 ENCOUNTER — MED REC SCAN ONLY (OUTPATIENT)
Dept: FAMILY MEDICINE CLINIC | Facility: CLINIC | Age: 57
End: 2017-07-06

## 2017-08-10 ENCOUNTER — TELEPHONE (OUTPATIENT)
Dept: UROLOGY | Facility: HOSPITAL | Age: 57
End: 2017-08-10

## 2017-08-10 ENCOUNTER — OFFICE VISIT (OUTPATIENT)
Dept: UROLOGY | Facility: HOSPITAL | Age: 57
End: 2017-08-10
Attending: OBSTETRICS & GYNECOLOGY
Payer: COMMERCIAL

## 2017-08-10 VITALS
SYSTOLIC BLOOD PRESSURE: 124 MMHG | BODY MASS INDEX: 30.78 KG/M2 | HEIGHT: 65.5 IN | WEIGHT: 187 LBS | DIASTOLIC BLOOD PRESSURE: 84 MMHG

## 2017-08-10 DIAGNOSIS — N95.2 POSTMENOPAUSAL ATROPHIC VAGINITIS: Primary | ICD-10-CM

## 2017-08-10 DIAGNOSIS — N30.10 INTERSTITIAL CYSTITIS: ICD-10-CM

## 2017-08-10 PROCEDURE — 81002 URINALYSIS NONAUTO W/O SCOPE: CPT

## 2017-08-10 PROCEDURE — 51700 IRRIGATION OF BLADDER: CPT

## 2017-08-10 RX ORDER — HEPARIN SODIUM 10000 [USP'U]/ML
40000 INJECTION, SOLUTION INTRAVENOUS; SUBCUTANEOUS ONCE
Status: COMPLETED | OUTPATIENT
Start: 2017-08-10 | End: 2017-08-10

## 2017-08-10 RX ORDER — LIDOCAINE HYDROCHLORIDE 20 MG/ML
10 JELLY TOPICAL ONCE
Status: COMPLETED | OUTPATIENT
Start: 2017-08-10 | End: 2017-08-10

## 2017-08-10 RX ORDER — 0.9 % SODIUM CHLORIDE 0.9 %
17 VIAL (ML) INJECTION ONCE
Status: COMPLETED | OUTPATIENT
Start: 2017-08-10 | End: 2017-08-10

## 2017-08-10 RX ADMIN — HEPARIN SODIUM 40000 UNITS: 10000 INJECTION, SOLUTION INTRAVENOUS; SUBCUTANEOUS at 13:30:00

## 2017-08-10 RX ADMIN — 0.9 % SODIUM CHLORIDE 17 ML: 0.9 % VIAL (ML) INJECTION at 13:30:00

## 2017-08-10 RX ADMIN — LIDOCAINE HYDROCHLORIDE 10 ML: 20 JELLY TOPICAL at 13:30:00

## 2017-08-10 NOTE — PROCEDURES
.Patient here for rescue instill. Patient reports feeling a lot of cramping pain last few weeks while on vacation, forgot to bring her vaginal valium supp. Used x2 this week with small relief.   Pt is awaiting denial letter from insurance company about alverto

## 2017-08-10 NOTE — TELEPHONE ENCOUNTER
GEMINI Jean, for Vaginal valium suppository 10 mg 3 times weekly prn #45, 2 refills. Gave phone approval to [facility] to refill.

## 2017-08-21 ENCOUNTER — OFFICE VISIT (OUTPATIENT)
Dept: UROLOGY | Facility: HOSPITAL | Age: 57
End: 2017-08-21
Attending: OBSTETRICS & GYNECOLOGY
Payer: COMMERCIAL

## 2017-08-21 VITALS
DIASTOLIC BLOOD PRESSURE: 84 MMHG | SYSTOLIC BLOOD PRESSURE: 126 MMHG | BODY MASS INDEX: 30.78 KG/M2 | HEIGHT: 65.5 IN | WEIGHT: 187 LBS

## 2017-08-21 DIAGNOSIS — R39.15 URGENCY OF URINATION: ICD-10-CM

## 2017-08-21 DIAGNOSIS — N95.2 POSTMENOPAUSAL ATROPHIC VAGINITIS: Primary | ICD-10-CM

## 2017-08-21 DIAGNOSIS — N30.10 CHRONIC INTERSTITIAL CYSTITIS: ICD-10-CM

## 2017-08-21 PROCEDURE — 51700 IRRIGATION OF BLADDER: CPT

## 2017-08-21 PROCEDURE — 81002 URINALYSIS NONAUTO W/O SCOPE: CPT

## 2017-08-21 RX ORDER — LIDOCAINE HYDROCHLORIDE 20 MG/ML
10 JELLY TOPICAL ONCE
Status: COMPLETED | OUTPATIENT
Start: 2017-08-21 | End: 2017-08-21

## 2017-08-21 RX ORDER — 0.9 % SODIUM CHLORIDE 0.9 %
17 VIAL (ML) INJECTION ONCE
Status: COMPLETED | OUTPATIENT
Start: 2017-08-21 | End: 2017-08-21

## 2017-08-21 RX ORDER — HEPARIN SODIUM 10000 [USP'U]/ML
40000 INJECTION, SOLUTION INTRAVENOUS; SUBCUTANEOUS ONCE
Status: COMPLETED | OUTPATIENT
Start: 2017-08-21 | End: 2017-08-21

## 2017-08-21 RX ADMIN — 0.9 % SODIUM CHLORIDE 17 ML: 0.9 % VIAL (ML) INJECTION at 13:45:00

## 2017-08-21 RX ADMIN — LIDOCAINE HYDROCHLORIDE 10 ML: 20 JELLY TOPICAL at 13:45:00

## 2017-08-21 RX ADMIN — HEPARIN SODIUM 40000 UNITS: 10000 INJECTION, SOLUTION INTRAVENOUS; SUBCUTANEOUS at 13:45:00

## 2017-08-21 NOTE — PROCEDURES
.Patient here for rescue instill. Patient reports feeling \"intense bladder pain\". Instill given per protocol. Patient catheterized for a residual of 15 ml. Chemstrip performed. Patient tolerated procedure well. Next instill scheduled for next week.

## 2017-08-28 ENCOUNTER — OFFICE VISIT (OUTPATIENT)
Dept: UROLOGY | Facility: HOSPITAL | Age: 57
End: 2017-08-28
Attending: OBSTETRICS & GYNECOLOGY
Payer: COMMERCIAL

## 2017-08-28 VITALS
HEIGHT: 65.5 IN | SYSTOLIC BLOOD PRESSURE: 132 MMHG | WEIGHT: 187 LBS | BODY MASS INDEX: 30.78 KG/M2 | DIASTOLIC BLOOD PRESSURE: 86 MMHG

## 2017-08-28 DIAGNOSIS — N95.2 POSTMENOPAUSAL ATROPHIC VAGINITIS: Primary | ICD-10-CM

## 2017-08-28 DIAGNOSIS — N30.10 CHRONIC INTERSTITIAL CYSTITIS: ICD-10-CM

## 2017-08-28 DIAGNOSIS — R30.0 DYSURIA: ICD-10-CM

## 2017-08-28 PROCEDURE — 81002 URINALYSIS NONAUTO W/O SCOPE: CPT

## 2017-08-28 PROCEDURE — 51700 IRRIGATION OF BLADDER: CPT

## 2017-08-28 RX ORDER — 0.9 % SODIUM CHLORIDE 0.9 %
17 VIAL (ML) INJECTION ONCE
Status: COMPLETED | OUTPATIENT
Start: 2017-08-28 | End: 2017-08-28

## 2017-08-28 RX ORDER — HEPARIN SODIUM 10000 [USP'U]/ML
40000 INJECTION, SOLUTION INTRAVENOUS; SUBCUTANEOUS ONCE
Status: COMPLETED | OUTPATIENT
Start: 2017-08-28 | End: 2017-08-28

## 2017-08-28 RX ORDER — LIDOCAINE HYDROCHLORIDE 20 MG/ML
10 JELLY TOPICAL ONCE
Status: COMPLETED | OUTPATIENT
Start: 2017-08-28 | End: 2017-08-28

## 2017-08-28 RX ADMIN — LIDOCAINE HYDROCHLORIDE 10 ML: 20 JELLY TOPICAL at 13:45:00

## 2017-08-28 RX ADMIN — 0.9 % SODIUM CHLORIDE 17 ML: 0.9 % VIAL (ML) INJECTION at 13:45:00

## 2017-08-28 RX ADMIN — HEPARIN SODIUM 40000 UNITS: 10000 INJECTION, SOLUTION INTRAVENOUS; SUBCUTANEOUS at 13:45:00

## 2017-08-28 NOTE — PROCEDURES
.Patient here for weekly instill. Patient reports feeling like she isn't getting the pain relief like she did back a few months ago. Very difficult to pass catheter. This has been going on for quite awhile w/ each instill. Instill given per protocol.   P

## 2017-09-14 ENCOUNTER — HOSPITAL ENCOUNTER (OUTPATIENT)
Dept: MAMMOGRAPHY | Facility: HOSPITAL | Age: 57
Discharge: HOME | End: 2017-09-14
Attending: OBSTETRICS & GYNECOLOGY
Payer: COMMERCIAL

## 2017-09-14 ENCOUNTER — OFFICE VISIT (OUTPATIENT)
Dept: UROLOGY | Facility: HOSPITAL | Age: 57
End: 2017-09-14
Attending: OBSTETRICS & GYNECOLOGY
Payer: COMMERCIAL

## 2017-09-14 VITALS
WEIGHT: 187 LBS | SYSTOLIC BLOOD PRESSURE: 140 MMHG | HEIGHT: 65.5 IN | BODY MASS INDEX: 30.78 KG/M2 | DIASTOLIC BLOOD PRESSURE: 90 MMHG

## 2017-09-14 DIAGNOSIS — R30.0 DYSURIA: ICD-10-CM

## 2017-09-14 DIAGNOSIS — N63.0 BREAST MASS IN FEMALE: ICD-10-CM

## 2017-09-14 DIAGNOSIS — N60.01 BILATERAL BREAST CYSTS: ICD-10-CM

## 2017-09-14 DIAGNOSIS — N60.02 BILATERAL BREAST CYSTS: ICD-10-CM

## 2017-09-14 DIAGNOSIS — N95.2 POSTMENOPAUSAL ATROPHIC VAGINITIS: Primary | ICD-10-CM

## 2017-09-14 DIAGNOSIS — N30.10 CHRONIC INTERSTITIAL CYSTITIS: ICD-10-CM

## 2017-09-14 PROCEDURE — 51700 IRRIGATION OF BLADDER: CPT

## 2017-09-14 PROCEDURE — 77062 BREAST TOMOSYNTHESIS BI: CPT | Performed by: OBSTETRICS & GYNECOLOGY

## 2017-09-14 PROCEDURE — 81002 URINALYSIS NONAUTO W/O SCOPE: CPT

## 2017-09-14 PROCEDURE — 77066 DX MAMMO INCL CAD BI: CPT | Performed by: OBSTETRICS & GYNECOLOGY

## 2017-09-14 PROCEDURE — 76641 ULTRASOUND BREAST COMPLETE: CPT | Performed by: OBSTETRICS & GYNECOLOGY

## 2017-09-14 RX ORDER — HEPARIN SODIUM 10000 [USP'U]/ML
40000 INJECTION, SOLUTION INTRAVENOUS; SUBCUTANEOUS ONCE
Status: COMPLETED | OUTPATIENT
Start: 2017-09-14 | End: 2017-09-14

## 2017-09-14 RX ORDER — LIDOCAINE HYDROCHLORIDE 20 MG/ML
10 JELLY TOPICAL ONCE
Status: COMPLETED | OUTPATIENT
Start: 2017-09-14 | End: 2017-09-14

## 2017-09-14 RX ORDER — 0.9 % SODIUM CHLORIDE 0.9 %
17 VIAL (ML) INJECTION ONCE
Status: COMPLETED | OUTPATIENT
Start: 2017-09-14 | End: 2017-09-14

## 2017-09-14 RX ADMIN — LIDOCAINE HYDROCHLORIDE 10 ML: 20 JELLY TOPICAL at 14:47:00

## 2017-09-14 RX ADMIN — 0.9 % SODIUM CHLORIDE 17 ML: 0.9 % VIAL (ML) INJECTION at 14:47:00

## 2017-09-14 RX ADMIN — HEPARIN SODIUM 40000 UNITS: 10000 INJECTION, SOLUTION INTRAVENOUS; SUBCUTANEOUS at 14:47:00

## 2017-09-14 NOTE — PROCEDURES
.Patient here for instill. Patient reports feeling cramping and bladder pain. Instill given per protocol. Patient catheterized for a residual of 3 ml. Chemstrip performed. Patient tolerated procedure well.   Next instill scheduled for 9/26/17 w/ Dr Franklin Frye

## 2017-09-18 DIAGNOSIS — I10 ESSENTIAL HYPERTENSION: ICD-10-CM

## 2017-09-19 RX ORDER — LIOTHYRONINE SODIUM 5 MCG
TABLET ORAL
Qty: 30 TABLET | Refills: 2 | Status: SHIPPED | OUTPATIENT
Start: 2017-09-19 | End: 2017-12-12

## 2017-09-19 NOTE — TELEPHONE ENCOUNTER
Rx request for med Cytomel 5 mg # 30 with 0 refills from [address] [place] [address]. Last refilled on 6/21/17 # 30 with 2 refills. LOV 6/8/17 with Selwyn Rosenthal PA-C for dizziness, nausea, asthma, and migraine.     LOV 5/12/17 with Leslie Ashton PA-C for annua

## 2017-09-26 ENCOUNTER — OFFICE VISIT (OUTPATIENT)
Dept: UROLOGY | Facility: HOSPITAL | Age: 57
End: 2017-09-26
Attending: OBSTETRICS & GYNECOLOGY
Payer: COMMERCIAL

## 2017-09-26 VITALS
SYSTOLIC BLOOD PRESSURE: 132 MMHG | DIASTOLIC BLOOD PRESSURE: 74 MMHG | HEIGHT: 65.5 IN | BODY MASS INDEX: 30.78 KG/M2 | WEIGHT: 187 LBS

## 2017-09-26 DIAGNOSIS — R39.15 URINARY URGENCY: ICD-10-CM

## 2017-09-26 DIAGNOSIS — N30.10 CHRONIC INTERSTITIAL CYSTITIS: ICD-10-CM

## 2017-09-26 DIAGNOSIS — N95.2 POSTMENOPAUSAL ATROPHIC VAGINITIS: Primary | ICD-10-CM

## 2017-09-26 DIAGNOSIS — R35.0 URINARY FREQUENCY: ICD-10-CM

## 2017-09-26 PROCEDURE — 99211 OFF/OP EST MAY X REQ PHY/QHP: CPT

## 2017-09-26 PROCEDURE — 51700 IRRIGATION OF BLADDER: CPT

## 2017-09-26 PROCEDURE — 81002 URINALYSIS NONAUTO W/O SCOPE: CPT

## 2017-09-26 RX ORDER — HEPARIN SODIUM 10000 [USP'U]/ML
40000 INJECTION, SOLUTION INTRAVENOUS; SUBCUTANEOUS ONCE
Status: COMPLETED | OUTPATIENT
Start: 2017-09-26 | End: 2017-09-26

## 2017-09-26 RX ORDER — LIDOCAINE HYDROCHLORIDE 20 MG/ML
10 JELLY TOPICAL ONCE
Status: COMPLETED | OUTPATIENT
Start: 2017-09-26 | End: 2017-09-26

## 2017-09-26 RX ORDER — 0.9 % SODIUM CHLORIDE 0.9 %
17 VIAL (ML) INJECTION ONCE
Status: COMPLETED | OUTPATIENT
Start: 2017-09-26 | End: 2017-09-26

## 2017-09-26 RX ORDER — LORAZEPAM 1 MG/1
2 TABLET ORAL NIGHTLY
COMMUNITY
End: 2018-03-22

## 2017-09-26 RX ADMIN — LIDOCAINE HYDROCHLORIDE 10 ML: 20 JELLY TOPICAL at 12:07:00

## 2017-09-26 RX ADMIN — 0.9 % SODIUM CHLORIDE 17 ML: 0.9 % VIAL (ML) INJECTION at 12:07:00

## 2017-09-26 RX ADMIN — HEPARIN SODIUM 40000 UNITS: 10000 INJECTION, SOLUTION INTRAVENOUS; SUBCUTANEOUS at 12:06:00

## 2017-09-26 NOTE — PROCEDURES
.Patient here for instill #5, Dr. Graeme Burnham annual follow up appt. Patient reports feeling better after last instill. Instill given per protocol. Patient catheterized for a residual of 15 ml. Chemstrip performed. Patient tolerated procedure well.   Next insti

## 2017-09-26 NOTE — PROGRESS NOTES
Patient presents to follow up IC    She is currently using bladder instills, vag estrogen, vag valium    She is most bothered by pelvic pain, bladder pain  Considering pelvic floor PT    Had vag laser with Luc[facility] and reports some improvement, doing touch

## 2017-10-23 ENCOUNTER — OFFICE VISIT (OUTPATIENT)
Dept: UROLOGY | Facility: HOSPITAL | Age: 57
End: 2017-10-23
Attending: OBSTETRICS & GYNECOLOGY
Payer: COMMERCIAL

## 2017-10-23 DIAGNOSIS — N30.10 CYSTITIS, INTERSTITIAL: Primary | ICD-10-CM

## 2017-10-23 PROCEDURE — 81002 URINALYSIS NONAUTO W/O SCOPE: CPT

## 2017-10-23 PROCEDURE — 51700 IRRIGATION OF BLADDER: CPT

## 2017-10-23 RX ORDER — HEPARIN SODIUM 10000 [USP'U]/ML
40000 INJECTION, SOLUTION INTRAVENOUS; SUBCUTANEOUS ONCE
Status: COMPLETED | OUTPATIENT
Start: 2017-10-23 | End: 2017-10-23

## 2017-10-23 RX ORDER — 0.9 % SODIUM CHLORIDE 0.9 %
17 VIAL (ML) INJECTION ONCE
Status: COMPLETED | OUTPATIENT
Start: 2017-10-23 | End: 2017-10-23

## 2017-10-23 RX ORDER — LIDOCAINE HYDROCHLORIDE 20 MG/ML
10 JELLY TOPICAL ONCE
Status: COMPLETED | OUTPATIENT
Start: 2017-10-23 | End: 2017-10-23

## 2017-10-23 RX ADMIN — LIDOCAINE HYDROCHLORIDE 10 ML: 20 JELLY TOPICAL at 16:34:00

## 2017-10-23 RX ADMIN — 0.9 % SODIUM CHLORIDE 17 ML: 0.9 % VIAL (ML) INJECTION at 16:34:00

## 2017-10-23 RX ADMIN — HEPARIN SODIUM 40000 UNITS: 10000 INJECTION, SOLUTION INTRAVENOUS; SUBCUTANEOUS at 16:34:00

## 2017-10-23 NOTE — PROCEDURES
.Patient here for rescue instill. Patient reports feeling urgency. Instill given per protocol. Patient catheterized for a residual of 40ml. Chemstrip performed. Patient tolerated procedure well. Next instill scheduled as needed.

## 2017-10-26 ENCOUNTER — OFFICE VISIT (OUTPATIENT)
Dept: UROLOGY | Facility: HOSPITAL | Age: 57
End: 2017-10-26
Attending: OBSTETRICS & GYNECOLOGY
Payer: COMMERCIAL

## 2017-10-26 VITALS
SYSTOLIC BLOOD PRESSURE: 130 MMHG | BODY MASS INDEX: 30.78 KG/M2 | WEIGHT: 187 LBS | DIASTOLIC BLOOD PRESSURE: 80 MMHG | HEIGHT: 65.5 IN

## 2017-10-26 DIAGNOSIS — R39.9 UTI SYMPTOMS: Primary | ICD-10-CM

## 2017-10-26 DIAGNOSIS — N30.10 CYSTITIS, INTERSTITIAL: ICD-10-CM

## 2017-10-26 DIAGNOSIS — N95.2 POSTMENOPAUSAL ATROPHIC VAGINITIS: ICD-10-CM

## 2017-10-26 DIAGNOSIS — R30.0 DYSURIA: ICD-10-CM

## 2017-10-26 PROCEDURE — 87186 SC STD MICRODIL/AGAR DIL: CPT

## 2017-10-26 PROCEDURE — 87088 URINE BACTERIA CULTURE: CPT

## 2017-10-26 PROCEDURE — 87086 URINE CULTURE/COLONY COUNT: CPT

## 2017-10-26 PROCEDURE — 51700 IRRIGATION OF BLADDER: CPT

## 2017-10-26 PROCEDURE — 81001 URINALYSIS AUTO W/SCOPE: CPT

## 2017-10-26 PROCEDURE — 81002 URINALYSIS NONAUTO W/O SCOPE: CPT

## 2017-10-26 RX ORDER — NITROFURANTOIN 25; 75 MG/1; MG/1
100 CAPSULE ORAL 2 TIMES DAILY
Qty: 14 CAPSULE | Refills: 0 | Status: SHIPPED | OUTPATIENT
Start: 2017-10-26 | End: 2017-11-02

## 2017-10-26 RX ORDER — HEPARIN SODIUM 10000 [USP'U]/ML
40000 INJECTION, SOLUTION INTRAVENOUS; SUBCUTANEOUS ONCE
Status: COMPLETED | OUTPATIENT
Start: 2017-10-26 | End: 2017-10-26

## 2017-10-26 RX ORDER — LIDOCAINE HYDROCHLORIDE 20 MG/ML
10 JELLY TOPICAL ONCE
Status: COMPLETED | OUTPATIENT
Start: 2017-10-26 | End: 2017-10-26

## 2017-10-26 RX ORDER — 0.9 % SODIUM CHLORIDE 0.9 %
17 VIAL (ML) INJECTION ONCE
Status: COMPLETED | OUTPATIENT
Start: 2017-10-26 | End: 2017-10-26

## 2017-10-26 RX ADMIN — 0.9 % SODIUM CHLORIDE 17 ML: 0.9 % VIAL (ML) INJECTION at 11:40:00

## 2017-10-26 RX ADMIN — LIDOCAINE HYDROCHLORIDE 10 ML: 20 JELLY TOPICAL at 11:40:00

## 2017-10-26 RX ADMIN — HEPARIN SODIUM 40000 UNITS: 10000 INJECTION, SOLUTION INTRAVENOUS; SUBCUTANEOUS at 11:40:00

## 2017-10-26 NOTE — PROCEDURES
.Patient here for instill. Patient reports feeling very uncomfortable. Instill given per protocol. Patient catheterized for a residual of 10 ml. Chemstrip performed. Patient tolerated procedure well. GEMINI Malone Macrobid 100mg po BID X 7days.

## 2017-10-30 ENCOUNTER — TELEPHONE (OUTPATIENT)
Dept: UROLOGY | Facility: HOSPITAL | Age: 57
End: 2017-10-30

## 2017-10-30 NOTE — TELEPHONE ENCOUNTER
Pt phoned w/ urine culture results. Pt states she is feeling a little better, but still very uncomfortable. Advised pt to continue abx and call by day 6-7 if she was not feeling better and we would consider extending abx for a few more days.

## 2017-11-02 ENCOUNTER — TELEPHONE (OUTPATIENT)
Dept: UROLOGY | Facility: HOSPITAL | Age: 57
End: 2017-11-02

## 2017-11-02 ENCOUNTER — OFFICE VISIT (OUTPATIENT)
Dept: UROLOGY | Facility: HOSPITAL | Age: 57
End: 2017-11-02
Attending: OBSTETRICS & GYNECOLOGY
Payer: COMMERCIAL

## 2017-11-02 VITALS
WEIGHT: 187 LBS | BODY MASS INDEX: 30.78 KG/M2 | SYSTOLIC BLOOD PRESSURE: 128 MMHG | HEIGHT: 65.5 IN | DIASTOLIC BLOOD PRESSURE: 84 MMHG

## 2017-11-02 DIAGNOSIS — N30.10 INTERSTITIAL CYSTITIS: ICD-10-CM

## 2017-11-02 DIAGNOSIS — N95.2 POSTMENOPAUSAL ATROPHIC VAGINITIS: Primary | ICD-10-CM

## 2017-11-02 PROCEDURE — 51700 IRRIGATION OF BLADDER: CPT

## 2017-11-02 RX ORDER — NITROFURANTOIN 25; 75 MG/1; MG/1
100 CAPSULE ORAL 2 TIMES DAILY
Qty: 14 CAPSULE | Refills: 0 | Status: SHIPPED | OUTPATIENT
Start: 2017-11-02 | End: 2017-11-09

## 2017-11-02 RX ORDER — 0.9 % SODIUM CHLORIDE 0.9 %
17 VIAL (ML) INJECTION ONCE
Status: COMPLETED | OUTPATIENT
Start: 2017-11-02 | End: 2017-11-02

## 2017-11-02 RX ORDER — HEPARIN SODIUM 10000 [USP'U]/ML
40000 INJECTION, SOLUTION INTRAVENOUS; SUBCUTANEOUS ONCE
Status: COMPLETED | OUTPATIENT
Start: 2017-11-02 | End: 2017-11-02

## 2017-11-02 RX ORDER — LIDOCAINE HYDROCHLORIDE 20 MG/ML
10 JELLY TOPICAL ONCE
Status: COMPLETED | OUTPATIENT
Start: 2017-11-02 | End: 2017-11-02

## 2017-11-02 RX ADMIN — 0.9 % SODIUM CHLORIDE 17 ML: 0.9 % VIAL (ML) INJECTION at 15:00:00

## 2017-11-02 RX ADMIN — LIDOCAINE HYDROCHLORIDE 10 ML: 20 JELLY TOPICAL at 15:00:00

## 2017-11-02 RX ADMIN — HEPARIN SODIUM 40000 UNITS: 10000 INJECTION, SOLUTION INTRAVENOUS; SUBCUTANEOUS at 15:00:00

## 2017-11-02 NOTE — PROCEDURES
.Patient here for weekly instill. Patient reports feeling frequency, burning. Instill given per protocol. Patient catheterized for a residual of 2ml. Patient tolerated procedure well. Next instill scheduled for next week.

## 2017-11-02 NOTE — TELEPHONE ENCOUNTER
Pt is still feeling sx from last weeks UTI. TORB Dr Marta Parrish, macrobid 100mg BID for 1 more week. Pt coming in today for an instill.

## 2017-11-09 ENCOUNTER — OFFICE VISIT (OUTPATIENT)
Dept: UROLOGY | Facility: HOSPITAL | Age: 57
End: 2017-11-09
Attending: OBSTETRICS & GYNECOLOGY
Payer: COMMERCIAL

## 2017-11-09 VITALS
HEIGHT: 65.5 IN | SYSTOLIC BLOOD PRESSURE: 132 MMHG | BODY MASS INDEX: 30.78 KG/M2 | DIASTOLIC BLOOD PRESSURE: 84 MMHG | WEIGHT: 187 LBS

## 2017-11-09 DIAGNOSIS — N30.10 INTERSTITIAL CYSTITIS: ICD-10-CM

## 2017-11-09 DIAGNOSIS — N95.2 POSTMENOPAUSAL ATROPHIC VAGINITIS: Primary | ICD-10-CM

## 2017-11-09 PROCEDURE — 81002 URINALYSIS NONAUTO W/O SCOPE: CPT

## 2017-11-09 PROCEDURE — 51700 IRRIGATION OF BLADDER: CPT

## 2017-11-09 RX ORDER — 0.9 % SODIUM CHLORIDE 0.9 %
17 VIAL (ML) INJECTION ONCE
Status: COMPLETED | OUTPATIENT
Start: 2017-11-09 | End: 2017-11-09

## 2017-11-09 RX ORDER — HEPARIN SODIUM 10000 [USP'U]/ML
40000 INJECTION, SOLUTION INTRAVENOUS; SUBCUTANEOUS ONCE
Status: COMPLETED | OUTPATIENT
Start: 2017-11-09 | End: 2017-11-09

## 2017-11-09 RX ORDER — LIDOCAINE HYDROCHLORIDE 20 MG/ML
10 JELLY TOPICAL ONCE
Status: COMPLETED | OUTPATIENT
Start: 2017-11-09 | End: 2017-11-09

## 2017-11-09 RX ADMIN — LIDOCAINE HYDROCHLORIDE 10 ML: 20 JELLY TOPICAL at 15:00:00

## 2017-11-09 RX ADMIN — HEPARIN SODIUM 40000 UNITS: 10000 INJECTION, SOLUTION INTRAVENOUS; SUBCUTANEOUS at 15:00:00

## 2017-11-09 RX ADMIN — 0.9 % SODIUM CHLORIDE 17 ML: 0.9 % VIAL (ML) INJECTION at 15:00:00

## 2017-11-09 NOTE — PATIENT INSTRUCTIONS
[address] [address] PELVIC MEDICINE    BOWEL REGIMEN    Constipation can have detrimental effects on bladder function and can worsen the symptoms of prolapse. It is important to avoid constipation.     The first step for treating constipation is to i

## 2017-11-09 NOTE — PROCEDURES
.Patient here for instill. Patient reports feeling IC sx. Instill given per protocol. Patient catheterized for a residual of 4 ml. Chemstrip performed. Patient tolerated procedure well. Next instill scheduled for .

## 2017-12-11 ENCOUNTER — OFFICE VISIT (OUTPATIENT)
Dept: UROLOGY | Facility: HOSPITAL | Age: 57
End: 2017-12-11
Attending: OBSTETRICS & GYNECOLOGY
Payer: COMMERCIAL

## 2017-12-11 VITALS
HEIGHT: 65.5 IN | WEIGHT: 187 LBS | BODY MASS INDEX: 30.78 KG/M2 | DIASTOLIC BLOOD PRESSURE: 80 MMHG | SYSTOLIC BLOOD PRESSURE: 130 MMHG

## 2017-12-11 DIAGNOSIS — R39.15 URINARY URGENCY: ICD-10-CM

## 2017-12-11 DIAGNOSIS — N30.10 INTERSTITIAL CYSTITIS: Primary | ICD-10-CM

## 2017-12-11 DIAGNOSIS — N30.10 CYSTITIS, INTERSTITIAL: ICD-10-CM

## 2017-12-11 DIAGNOSIS — R30.0 DYSURIA: ICD-10-CM

## 2017-12-11 PROCEDURE — 87086 URINE CULTURE/COLONY COUNT: CPT

## 2017-12-11 PROCEDURE — 51700 IRRIGATION OF BLADDER: CPT

## 2017-12-11 PROCEDURE — 81002 URINALYSIS NONAUTO W/O SCOPE: CPT

## 2017-12-11 PROCEDURE — 81001 URINALYSIS AUTO W/SCOPE: CPT

## 2017-12-11 RX ORDER — 0.9 % SODIUM CHLORIDE 0.9 %
17 VIAL (ML) INJECTION ONCE
Status: COMPLETED | OUTPATIENT
Start: 2017-12-11 | End: 2017-12-11

## 2017-12-11 RX ORDER — PHENAZOPYRIDINE HYDROCHLORIDE 100 MG/1
100 TABLET, FILM COATED ORAL 3 TIMES DAILY PRN
COMMUNITY
End: 2018-03-22

## 2017-12-11 RX ORDER — HEPARIN SODIUM 10000 [USP'U]/ML
40000 INJECTION, SOLUTION INTRAVENOUS; SUBCUTANEOUS ONCE
Status: COMPLETED | OUTPATIENT
Start: 2017-12-11 | End: 2017-12-11

## 2017-12-11 RX ORDER — LIDOCAINE HYDROCHLORIDE 20 MG/ML
10 JELLY TOPICAL ONCE
Status: COMPLETED | OUTPATIENT
Start: 2017-12-11 | End: 2017-12-11

## 2017-12-11 RX ORDER — NITROFURANTOIN 25; 75 MG/1; MG/1
100 CAPSULE ORAL 2 TIMES DAILY
Qty: 14 CAPSULE | Refills: 0 | Status: SHIPPED | OUTPATIENT
Start: 2017-12-11 | End: 2017-12-18

## 2017-12-11 RX ADMIN — LIDOCAINE HYDROCHLORIDE 10 ML: 20 JELLY TOPICAL at 14:01:00

## 2017-12-11 RX ADMIN — 0.9 % SODIUM CHLORIDE 17 ML: 0.9 % VIAL (ML) INJECTION at 14:02:00

## 2017-12-11 RX ADMIN — HEPARIN SODIUM 40000 UNITS: 10000 INJECTION, SOLUTION INTRAVENOUS; SUBCUTANEOUS at 14:01:00

## 2017-12-11 NOTE — PROCEDURES
.Patient here for instill. Patient reports feeling IC and UTI sx. Instill given per protocol. Patient catheterized for a residual of 50 ml. Chemstrip performed. Patient tolerated procedure well. Next instill scheduled for next week.  Dr Akers Daily mess

## 2017-12-11 NOTE — PROGRESS NOTES
..Patient here for instill . Patient reports feeling like she has a UTI, c/u burning with urination, bladder pain and frequency, taking Pyridium. Instill given per protocol. Patient catheterized for a residual of 50 ml. Chemstrip performed.   Patient to

## 2017-12-12 ENCOUNTER — TELEPHONE (OUTPATIENT)
Dept: FAMILY MEDICINE CLINIC | Facility: CLINIC | Age: 57
End: 2017-12-12

## 2017-12-12 DIAGNOSIS — E03.9 HYPOTHYROIDISM, UNSPECIFIED TYPE: ICD-10-CM

## 2017-12-12 DIAGNOSIS — I10 ESSENTIAL HYPERTENSION: ICD-10-CM

## 2017-12-12 RX ORDER — RISPERIDONE 120 MG
KIT SUBCUTANEOUS
Refills: 0 | COMMUNITY
Start: 2017-12-07 | End: 2018-06-15

## 2017-12-12 RX ORDER — LORAZEPAM 2 MG/1
TABLET ORAL
Refills: 1 | COMMUNITY
Start: 2017-12-01 | End: 2019-05-07

## 2017-12-12 RX ORDER — DOCUSATE SODIUM 100 MG/1
CAPSULE, LIQUID FILLED ORAL
Refills: 0 | COMMUNITY
Start: 2017-11-15 | End: 2018-03-22

## 2017-12-12 RX ORDER — DEXTROAMPHETAMINE SACCHARATE, AMPHETAMINE ASPARTATE MONOHYDRATE, DEXTROAMPHETAMINE SULFATE AND AMPHETAMINE SULFATE 5; 5; 5; 5 MG/1; MG/1; MG/1; MG/1
CAPSULE, EXTENDED RELEASE ORAL
Refills: 0 | COMMUNITY
Start: 2017-12-08 | End: 2018-03-22

## 2017-12-12 RX ORDER — HYDROCODONE BITARTRATE AND ACETAMINOPHEN 10; 325 MG/1; MG/1
TABLET ORAL
Refills: 0 | COMMUNITY
Start: 2017-11-15 | End: 2018-03-22

## 2017-12-12 RX ORDER — ESTRADIOL 0.07 MG/D
FILM, EXTENDED RELEASE TRANSDERMAL
Refills: 2 | COMMUNITY
Start: 2017-10-12 | End: 2018-03-28

## 2017-12-12 RX ORDER — CYCLOBENZAPRINE HCL 10 MG
TABLET ORAL
Refills: 0 | COMMUNITY
Start: 2017-12-06 | End: 2018-11-28

## 2017-12-12 RX ORDER — DESVENLAFAXINE 25 MG/1
TABLET, EXTENDED RELEASE ORAL
Refills: 3 | COMMUNITY
Start: 2017-12-08 | End: 2018-03-22

## 2017-12-12 RX ORDER — LIOTHYRONINE SODIUM 5 UG/1
TABLET ORAL
Qty: 30 TABLET | Refills: 2 | Status: SHIPPED | OUTPATIENT
Start: 2017-12-12 | End: 2018-03-22

## 2017-12-12 RX ORDER — LEVOTHYROXINE SODIUM 150 UG/1
TABLET ORAL
Qty: 90 TABLET | Refills: 0 | Status: SHIPPED | OUTPATIENT
Start: 2017-12-12 | End: 2018-03-22

## 2017-12-12 RX ORDER — ARIPIPRAZOLE 1 MG/ML
SOLUTION ORAL
Refills: 3 | COMMUNITY
Start: 2017-12-08 | End: 2018-03-22

## 2017-12-12 RX ORDER — ASPIRIN 325 MG
TABLET, DELAYED RELEASE (ENTERIC COATED) ORAL
Refills: 0 | COMMUNITY
Start: 2017-11-15 | End: 2018-03-22

## 2017-12-12 NOTE — TELEPHONE ENCOUNTER
LOV 6/8/2017 with Davis-Monge Squibb PA-c last labs 5/8/2017 called and told  that we refilled meds for patient

## 2017-12-12 NOTE — TELEPHONE ENCOUNTER
Patient requesting a refill of Cytomel and Synthroid. Patient not able to come into the office until January as she just had foot surgery and is having bladder surgery 12/29/17.  Please send to [facility]

## 2017-12-18 ENCOUNTER — OFFICE VISIT (OUTPATIENT)
Dept: UROLOGY | Facility: HOSPITAL | Age: 57
End: 2017-12-18
Attending: OBSTETRICS & GYNECOLOGY
Payer: COMMERCIAL

## 2017-12-18 VITALS
SYSTOLIC BLOOD PRESSURE: 140 MMHG | HEIGHT: 62.5 IN | BODY MASS INDEX: 33.55 KG/M2 | DIASTOLIC BLOOD PRESSURE: 88 MMHG | WEIGHT: 187 LBS

## 2017-12-18 DIAGNOSIS — R35.0 URINARY FREQUENCY: ICD-10-CM

## 2017-12-18 DIAGNOSIS — N30.10 INTERSTITIAL CYSTITIS: Primary | ICD-10-CM

## 2017-12-18 DIAGNOSIS — R39.15 URINARY URGENCY: ICD-10-CM

## 2017-12-18 PROCEDURE — 51700 IRRIGATION OF BLADDER: CPT

## 2017-12-18 PROCEDURE — 81002 URINALYSIS NONAUTO W/O SCOPE: CPT

## 2017-12-18 RX ORDER — HEPARIN SODIUM 10000 [USP'U]/ML
40000 INJECTION, SOLUTION INTRAVENOUS; SUBCUTANEOUS ONCE
Status: COMPLETED | OUTPATIENT
Start: 2017-12-18 | End: 2017-12-18

## 2017-12-18 RX ORDER — LIDOCAINE HYDROCHLORIDE 20 MG/ML
10 JELLY TOPICAL ONCE
Status: COMPLETED | OUTPATIENT
Start: 2017-12-18 | End: 2017-12-18

## 2017-12-18 RX ORDER — 0.9 % SODIUM CHLORIDE 0.9 %
17 VIAL (ML) INJECTION ONCE
Status: COMPLETED | OUTPATIENT
Start: 2017-12-18 | End: 2017-12-18

## 2017-12-18 RX ADMIN — HEPARIN SODIUM 40000 UNITS: 10000 INJECTION, SOLUTION INTRAVENOUS; SUBCUTANEOUS at 11:41:00

## 2017-12-18 RX ADMIN — LIDOCAINE HYDROCHLORIDE 10 ML: 20 JELLY TOPICAL at 11:41:00

## 2017-12-18 RX ADMIN — 0.9 % SODIUM CHLORIDE 17 ML: 0.9 % VIAL (ML) INJECTION at 11:41:00

## 2017-12-18 NOTE — PROCEDURES
..Patient here for instill . Patient reports feeling better since completing Macrobid antibiotics despite negative culture. Instill given per protocol. Patient catheterized for a residual of 30 ml. Chemstrip performed.   Patient tolerated procedure well

## 2018-02-19 ENCOUNTER — OFFICE VISIT (OUTPATIENT)
Dept: UROLOGY | Facility: HOSPITAL | Age: 58
End: 2018-02-19
Attending: OBSTETRICS & GYNECOLOGY
Payer: COMMERCIAL

## 2018-02-19 VITALS
HEIGHT: 62.5 IN | BODY MASS INDEX: 33.55 KG/M2 | SYSTOLIC BLOOD PRESSURE: 144 MMHG | WEIGHT: 187 LBS | DIASTOLIC BLOOD PRESSURE: 72 MMHG

## 2018-02-19 DIAGNOSIS — N30.10 INTERSTITIAL CYSTITIS: Primary | ICD-10-CM

## 2018-02-19 PROCEDURE — 81002 URINALYSIS NONAUTO W/O SCOPE: CPT

## 2018-02-19 PROCEDURE — 51700 IRRIGATION OF BLADDER: CPT

## 2018-02-19 RX ORDER — HEPARIN SODIUM 10000 [USP'U]/ML
40000 INJECTION, SOLUTION INTRAVENOUS; SUBCUTANEOUS ONCE
Status: COMPLETED | OUTPATIENT
Start: 2018-02-19 | End: 2018-02-19

## 2018-02-19 RX ORDER — 0.9 % SODIUM CHLORIDE 0.9 %
17 VIAL (ML) INJECTION ONCE
Status: COMPLETED | OUTPATIENT
Start: 2018-02-19 | End: 2018-02-19

## 2018-02-19 RX ORDER — LIDOCAINE HYDROCHLORIDE 20 MG/ML
10 JELLY TOPICAL ONCE
Status: COMPLETED | OUTPATIENT
Start: 2018-02-19 | End: 2018-02-19

## 2018-02-19 RX ADMIN — 0.9 % SODIUM CHLORIDE 17 ML: 0.9 % VIAL (ML) INJECTION at 14:50:00

## 2018-02-19 RX ADMIN — HEPARIN SODIUM 40000 UNITS: 10000 INJECTION, SOLUTION INTRAVENOUS; SUBCUTANEOUS at 14:50:00

## 2018-02-19 RX ADMIN — LIDOCAINE HYDROCHLORIDE 10 ML: 20 JELLY TOPICAL at 14:50:00

## 2018-02-19 NOTE — PROCEDURES
..Patient here for rescue instill. Patient reports feeling frequency, denies dysuria, also does not feel she empties her bladder completely. Instill given per protocol. Patient catheterized for a residual of 10 ml. Chemstrip performed.   Patient tolerat

## 2018-03-01 LAB
AMB EXT GLUCOSE: 108 MG/DL
AMB EXT HEMATOCRIT: 38.7
AMB EXT HEMOGLOBIN: 12.6
AMB EXT MCV: 89
AMB EXT PLATELETS: 353
AMB EXT TSH: 1.16 MIU/ML
AMB EXT WBC: 5.8 X10(3)UL

## 2018-03-05 ENCOUNTER — TELEPHONE (OUTPATIENT)
Dept: FAMILY MEDICINE CLINIC | Facility: CLINIC | Age: 58
End: 2018-03-05

## 2018-03-07 ENCOUNTER — TELEPHONE (OUTPATIENT)
Dept: UROLOGY | Facility: HOSPITAL | Age: 58
End: 2018-03-07

## 2018-03-08 ENCOUNTER — OFFICE VISIT (OUTPATIENT)
Dept: UROLOGY | Facility: HOSPITAL | Age: 58
End: 2018-03-08
Attending: OBSTETRICS & GYNECOLOGY
Payer: COMMERCIAL

## 2018-03-08 VITALS
WEIGHT: 187 LBS | BODY MASS INDEX: 34.41 KG/M2 | HEIGHT: 62 IN | DIASTOLIC BLOOD PRESSURE: 84 MMHG | SYSTOLIC BLOOD PRESSURE: 128 MMHG

## 2018-03-08 DIAGNOSIS — R39.15 URGENCY OF URINATION: Primary | ICD-10-CM

## 2018-03-08 PROCEDURE — 51700 IRRIGATION OF BLADDER: CPT

## 2018-03-08 PROCEDURE — 81002 URINALYSIS NONAUTO W/O SCOPE: CPT

## 2018-03-08 RX ORDER — 0.9 % SODIUM CHLORIDE 0.9 %
17 VIAL (ML) INJECTION ONCE
Status: COMPLETED | OUTPATIENT
Start: 2018-03-08 | End: 2018-03-08

## 2018-03-08 RX ORDER — HEPARIN SODIUM 10000 [USP'U]/ML
40000 INJECTION, SOLUTION INTRAVENOUS; SUBCUTANEOUS ONCE
Status: COMPLETED | OUTPATIENT
Start: 2018-03-08 | End: 2018-03-08

## 2018-03-08 RX ORDER — LIDOCAINE HYDROCHLORIDE 20 MG/ML
10 JELLY TOPICAL ONCE
Status: COMPLETED | OUTPATIENT
Start: 2018-03-08 | End: 2018-03-08

## 2018-03-08 RX ADMIN — LIDOCAINE HYDROCHLORIDE 10 ML: 20 JELLY TOPICAL at 14:02:00

## 2018-03-08 RX ADMIN — HEPARIN SODIUM 40000 UNITS: 10000 INJECTION, SOLUTION INTRAVENOUS; SUBCUTANEOUS at 14:01:00

## 2018-03-08 RX ADMIN — 0.9 % SODIUM CHLORIDE 17 ML: 0.9 % VIAL (ML) INJECTION at 14:02:00

## 2018-03-08 NOTE — PROCEDURES
..Patient here for instill( rescue). Patient reports feeling like she has a UTI - also has a bad migraine today. Instill given per protocol. Patient catheterized for a residual of 25ml. Chemstrip performed wit negative results.  Patient tolerated proced

## 2018-03-21 ENCOUNTER — OFFICE VISIT (OUTPATIENT)
Dept: UROLOGY | Facility: HOSPITAL | Age: 58
End: 2018-03-21
Attending: OBSTETRICS & GYNECOLOGY
Payer: COMMERCIAL

## 2018-03-21 VITALS
HEIGHT: 62 IN | BODY MASS INDEX: 34.41 KG/M2 | WEIGHT: 187 LBS | SYSTOLIC BLOOD PRESSURE: 126 MMHG | DIASTOLIC BLOOD PRESSURE: 78 MMHG

## 2018-03-21 DIAGNOSIS — R39.15 URGENCY OF URINATION: Primary | ICD-10-CM

## 2018-03-21 DIAGNOSIS — N30.10 INTERSTITIAL CYSTITIS: ICD-10-CM

## 2018-03-21 PROCEDURE — 51700 IRRIGATION OF BLADDER: CPT

## 2018-03-21 PROCEDURE — 81002 URINALYSIS NONAUTO W/O SCOPE: CPT

## 2018-03-21 RX ORDER — HEPARIN SODIUM 10000 [USP'U]/ML
40000 INJECTION, SOLUTION INTRAVENOUS; SUBCUTANEOUS ONCE
Status: COMPLETED | OUTPATIENT
Start: 2018-03-21 | End: 2018-03-21

## 2018-03-21 RX ORDER — LIDOCAINE HYDROCHLORIDE 20 MG/ML
10 JELLY TOPICAL ONCE
Status: COMPLETED | OUTPATIENT
Start: 2018-03-21 | End: 2018-03-21

## 2018-03-21 RX ORDER — 0.9 % SODIUM CHLORIDE 0.9 %
17 VIAL (ML) INJECTION ONCE
Status: COMPLETED | OUTPATIENT
Start: 2018-03-21 | End: 2018-03-21

## 2018-03-21 RX ADMIN — LIDOCAINE HYDROCHLORIDE 10 ML: 20 JELLY TOPICAL at 14:27:00

## 2018-03-21 RX ADMIN — HEPARIN SODIUM 40000 UNITS: 10000 INJECTION, SOLUTION INTRAVENOUS; SUBCUTANEOUS at 14:26:00

## 2018-03-21 RX ADMIN — 0.9 % SODIUM CHLORIDE 17 ML: 0.9 % VIAL (ML) INJECTION at 14:27:00

## 2018-03-21 NOTE — PROCEDURES
.Patient here for instill. Patient reports feeling the same. No improvement  Instill given per protocol. Patient catheterized for a residual of 5ml. Chemstrip performed. Patient tolerated procedure well. Next instill scheduled for next week.

## 2018-03-22 ENCOUNTER — OFFICE VISIT (OUTPATIENT)
Dept: FAMILY MEDICINE CLINIC | Facility: CLINIC | Age: 58
End: 2018-03-22

## 2018-03-22 VITALS
TEMPERATURE: 98 F | HEIGHT: 65.5 IN | SYSTOLIC BLOOD PRESSURE: 138 MMHG | DIASTOLIC BLOOD PRESSURE: 80 MMHG | RESPIRATION RATE: 16 BRPM | WEIGHT: 187 LBS | BODY MASS INDEX: 30.78 KG/M2 | HEART RATE: 120 BPM

## 2018-03-22 DIAGNOSIS — E89.0 POST-SURGICAL HYPOTHYROIDISM: ICD-10-CM

## 2018-03-22 DIAGNOSIS — IMO0002 MASS: Primary | ICD-10-CM

## 2018-03-22 DIAGNOSIS — I10 ESSENTIAL HYPERTENSION: ICD-10-CM

## 2018-03-22 DIAGNOSIS — N95.1 MENOPAUSAL SYMPTOMS: ICD-10-CM

## 2018-03-22 PROCEDURE — 99214 OFFICE O/P EST MOD 30 MIN: CPT | Performed by: FAMILY MEDICINE

## 2018-03-22 RX ORDER — LEVOTHYROXINE SODIUM 150 UG/1
TABLET ORAL
Qty: 90 TABLET | Refills: 3 | Status: SHIPPED | OUTPATIENT
Start: 2018-03-22 | End: 2019-12-04

## 2018-03-22 RX ORDER — LIOTHYRONINE SODIUM 5 MCG
TABLET ORAL
Qty: 90 TABLET | Refills: 3 | Status: SHIPPED | OUTPATIENT
Start: 2018-03-22 | End: 2019-05-24

## 2018-03-22 NOTE — PROGRESS NOTES
Kaiden Coreas is a 65year old female. HPI:   Patient presents for a medication follow up. Thyroid testing from GYN all in normal range. Having increased sweating. GYN increase HRT. Sx worse for last 6 months.   Pt worried about weight gain, but Maria Guadalupe Espinozal flashes/sweating. No orders of the defined types were placed in this encounter.     Meds & Refills for this Visit:  No prescriptions requested or ordered in this encounter  Imaging & Consults:  None

## 2018-03-23 RX ORDER — LIOTHYRONINE SODIUM 5 MCG
TABLET ORAL
Qty: 30 TABLET | Refills: 5 | Status: SHIPPED | OUTPATIENT
Start: 2018-03-23 | End: 2018-08-04

## 2018-03-28 ENCOUNTER — OFFICE VISIT (OUTPATIENT)
Dept: UROLOGY | Facility: HOSPITAL | Age: 58
End: 2018-03-28
Attending: OBSTETRICS & GYNECOLOGY
Payer: COMMERCIAL

## 2018-03-28 VITALS
WEIGHT: 187 LBS | HEIGHT: 65.5 IN | BODY MASS INDEX: 30.78 KG/M2 | DIASTOLIC BLOOD PRESSURE: 90 MMHG | SYSTOLIC BLOOD PRESSURE: 156 MMHG

## 2018-03-28 DIAGNOSIS — R39.15 URGENCY OF URINATION: Primary | ICD-10-CM

## 2018-03-28 DIAGNOSIS — N30.10 INTERSTITIAL CYSTITIS: ICD-10-CM

## 2018-03-28 PROCEDURE — 81002 URINALYSIS NONAUTO W/O SCOPE: CPT

## 2018-03-28 PROCEDURE — 51700 IRRIGATION OF BLADDER: CPT

## 2018-03-28 RX ORDER — 0.9 % SODIUM CHLORIDE 0.9 %
17 VIAL (ML) INJECTION ONCE
Status: COMPLETED | OUTPATIENT
Start: 2018-03-28 | End: 2018-03-28

## 2018-03-28 RX ORDER — LIDOCAINE HYDROCHLORIDE 20 MG/ML
10 JELLY TOPICAL ONCE
Status: COMPLETED | OUTPATIENT
Start: 2018-03-28 | End: 2018-03-28

## 2018-03-28 RX ORDER — HEPARIN SODIUM 10000 [USP'U]/ML
40000 INJECTION, SOLUTION INTRAVENOUS; SUBCUTANEOUS ONCE
Status: COMPLETED | OUTPATIENT
Start: 2018-03-28 | End: 2018-03-28

## 2018-03-28 RX ADMIN — HEPARIN SODIUM 40000 UNITS: 10000 INJECTION, SOLUTION INTRAVENOUS; SUBCUTANEOUS at 14:22:00

## 2018-03-28 RX ADMIN — LIDOCAINE HYDROCHLORIDE 10 ML: 20 JELLY TOPICAL at 14:22:00

## 2018-03-28 RX ADMIN — 0.9 % SODIUM CHLORIDE 17 ML: 0.9 % VIAL (ML) INJECTION at 14:22:00

## 2018-03-28 NOTE — PROCEDURES
..Patient here for rescue instill. Patient reports feeling increased bladder pain, frequency, took an antibiotic for sinus infection last week and feels that made her bladder worse, also under extreme stress with her father in [address] [place] [address].   Instill given

## 2018-04-04 ENCOUNTER — OFFICE VISIT (OUTPATIENT)
Dept: UROLOGY | Facility: HOSPITAL | Age: 58
End: 2018-04-04
Attending: OBSTETRICS & GYNECOLOGY
Payer: COMMERCIAL

## 2018-04-04 VITALS
HEIGHT: 65.5 IN | DIASTOLIC BLOOD PRESSURE: 78 MMHG | SYSTOLIC BLOOD PRESSURE: 130 MMHG | WEIGHT: 187 LBS | BODY MASS INDEX: 30.78 KG/M2

## 2018-04-04 DIAGNOSIS — R30.0 DYSURIA: ICD-10-CM

## 2018-04-04 DIAGNOSIS — N30.10 INTERSTITIAL CYSTITIS: ICD-10-CM

## 2018-04-04 DIAGNOSIS — R39.15 URGENCY OF URINATION: Primary | ICD-10-CM

## 2018-04-04 DIAGNOSIS — N95.2 POSTMENOPAUSAL ATROPHIC VAGINITIS: ICD-10-CM

## 2018-04-04 PROCEDURE — 87086 URINE CULTURE/COLONY COUNT: CPT

## 2018-04-04 PROCEDURE — 81002 URINALYSIS NONAUTO W/O SCOPE: CPT

## 2018-04-04 PROCEDURE — 51700 IRRIGATION OF BLADDER: CPT

## 2018-04-04 RX ORDER — ESTRADIOL 0.07 MG/D
1 FILM, EXTENDED RELEASE TRANSDERMAL
COMMUNITY

## 2018-04-16 ENCOUNTER — OFFICE VISIT (OUTPATIENT)
Dept: UROLOGY | Facility: HOSPITAL | Age: 58
End: 2018-04-16
Attending: OBSTETRICS & GYNECOLOGY
Payer: COMMERCIAL

## 2018-04-16 VITALS
BODY MASS INDEX: 30.78 KG/M2 | SYSTOLIC BLOOD PRESSURE: 122 MMHG | WEIGHT: 187 LBS | HEIGHT: 65.5 IN | DIASTOLIC BLOOD PRESSURE: 80 MMHG

## 2018-04-16 DIAGNOSIS — N30.10 INTERSTITIAL CYSTITIS: Primary | ICD-10-CM

## 2018-04-16 PROCEDURE — 81002 URINALYSIS NONAUTO W/O SCOPE: CPT

## 2018-04-16 PROCEDURE — 51700 IRRIGATION OF BLADDER: CPT

## 2018-04-16 RX ORDER — HEPARIN SODIUM 10000 [USP'U]/ML
40000 INJECTION, SOLUTION INTRAVENOUS; SUBCUTANEOUS ONCE
Status: COMPLETED | OUTPATIENT
Start: 2018-04-16 | End: 2018-04-16

## 2018-04-16 RX ORDER — LIDOCAINE HYDROCHLORIDE 20 MG/ML
10 JELLY TOPICAL ONCE
Status: COMPLETED | OUTPATIENT
Start: 2018-04-16 | End: 2018-04-16

## 2018-04-16 RX ORDER — 0.9 % SODIUM CHLORIDE 0.9 %
17 VIAL (ML) INJECTION ONCE
Status: COMPLETED | OUTPATIENT
Start: 2018-04-16 | End: 2018-04-16

## 2018-04-16 RX ADMIN — HEPARIN SODIUM 40000 UNITS: 10000 INJECTION, SOLUTION INTRAVENOUS; SUBCUTANEOUS at 15:16:00

## 2018-04-16 RX ADMIN — 0.9 % SODIUM CHLORIDE 17 ML: 0.9 % VIAL (ML) INJECTION at 15:16:00

## 2018-04-16 RX ADMIN — LIDOCAINE HYDROCHLORIDE 10 ML: 20 JELLY TOPICAL at 15:16:00

## 2018-04-16 NOTE — PROCEDURES
.Patient here for instill # 5. Patient reports feeling better after last instill. Feels like she has really turned a corner. Instill given per protocol. Patient catheterized for a residual of 5 ml. Chemstrip performed. Patient tolerated procedure well.

## 2018-04-23 ENCOUNTER — TELEPHONE (OUTPATIENT)
Dept: UROLOGY | Facility: HOSPITAL | Age: 58
End: 2018-04-23

## 2018-04-24 ENCOUNTER — TELEPHONE (OUTPATIENT)
Dept: UROLOGY | Facility: HOSPITAL | Age: 58
End: 2018-04-24

## 2018-04-26 NOTE — TELEPHONE ENCOUNTER
Spoke to pt about possibly trying PTNS for her urgency/frequency. Pt has tried elavil,elmiron and enablex. Will discuss PTNS w/ pt on next instill day and possibly starting oxybutynin, based on what Dr Lennox Novak orders.

## 2018-04-30 ENCOUNTER — OFFICE VISIT (OUTPATIENT)
Dept: UROLOGY | Facility: HOSPITAL | Age: 58
End: 2018-04-30
Attending: OBSTETRICS & GYNECOLOGY
Payer: COMMERCIAL

## 2018-04-30 VITALS
SYSTOLIC BLOOD PRESSURE: 128 MMHG | WEIGHT: 187 LBS | HEIGHT: 65.5 IN | BODY MASS INDEX: 30.78 KG/M2 | DIASTOLIC BLOOD PRESSURE: 82 MMHG

## 2018-04-30 DIAGNOSIS — N30.10 INTERSTITIAL CYSTITIS: Primary | ICD-10-CM

## 2018-04-30 DIAGNOSIS — N95.2 POSTMENOPAUSAL ATROPHIC VAGINITIS: ICD-10-CM

## 2018-04-30 DIAGNOSIS — R30.0 DYSURIA: ICD-10-CM

## 2018-04-30 DIAGNOSIS — R35.0 URINARY FREQUENCY: ICD-10-CM

## 2018-04-30 DIAGNOSIS — R39.15 URGENCY OF URINATION: ICD-10-CM

## 2018-04-30 PROCEDURE — 81002 URINALYSIS NONAUTO W/O SCOPE: CPT

## 2018-04-30 PROCEDURE — 51700 IRRIGATION OF BLADDER: CPT

## 2018-04-30 RX ORDER — LIDOCAINE HYDROCHLORIDE 20 MG/ML
10 JELLY TOPICAL ONCE
Status: COMPLETED | OUTPATIENT
Start: 2018-04-30 | End: 2018-04-30

## 2018-04-30 RX ORDER — OXYBUTYNIN CHLORIDE 5 MG/1
5 TABLET, EXTENDED RELEASE ORAL DAILY
Qty: 30 TABLET | Refills: 0 | Status: SHIPPED | OUTPATIENT
Start: 2018-04-30 | End: 2018-08-04

## 2018-04-30 RX ORDER — 0.9 % SODIUM CHLORIDE 0.9 %
17 VIAL (ML) INJECTION ONCE
Status: COMPLETED | OUTPATIENT
Start: 2018-04-30 | End: 2018-04-30

## 2018-04-30 RX ORDER — HEPARIN SODIUM 10000 [USP'U]/ML
40000 INJECTION, SOLUTION INTRAVENOUS; SUBCUTANEOUS ONCE
Status: COMPLETED | OUTPATIENT
Start: 2018-04-30 | End: 2018-04-30

## 2018-04-30 RX ADMIN — 0.9 % SODIUM CHLORIDE 17 ML: 0.9 % VIAL (ML) INJECTION at 14:08:00

## 2018-04-30 RX ADMIN — HEPARIN SODIUM 40000 UNITS: 10000 INJECTION, SOLUTION INTRAVENOUS; SUBCUTANEOUS at 14:08:00

## 2018-04-30 RX ADMIN — LIDOCAINE HYDROCHLORIDE 10 ML: 20 JELLY TOPICAL at 14:08:00

## 2018-04-30 NOTE — PROCEDURES
.Patient here for instill. Patient reports feeling bladder pain, dysuria,frequency and urgency. Has a lot of stress this week, so sx are worse. Instill given per protocol. Patient catheterized for a residual of 40 ml. Chemstrip performed.   Patient tole

## 2018-05-14 ENCOUNTER — OFFICE VISIT (OUTPATIENT)
Dept: UROLOGY | Facility: HOSPITAL | Age: 58
End: 2018-05-14
Attending: OBSTETRICS & GYNECOLOGY
Payer: COMMERCIAL

## 2018-05-14 VITALS
DIASTOLIC BLOOD PRESSURE: 78 MMHG | BODY MASS INDEX: 30.78 KG/M2 | WEIGHT: 187 LBS | HEIGHT: 65.5 IN | SYSTOLIC BLOOD PRESSURE: 124 MMHG

## 2018-05-14 DIAGNOSIS — R39.15 URGENCY OF URINATION: ICD-10-CM

## 2018-05-14 DIAGNOSIS — R35.0 URINARY FREQUENCY: ICD-10-CM

## 2018-05-14 DIAGNOSIS — N30.10 INTERSTITIAL CYSTITIS: Primary | ICD-10-CM

## 2018-05-14 PROCEDURE — 51700 IRRIGATION OF BLADDER: CPT

## 2018-05-14 PROCEDURE — 81002 URINALYSIS NONAUTO W/O SCOPE: CPT

## 2018-05-14 RX ORDER — HEPARIN SODIUM 10000 [USP'U]/ML
40000 INJECTION, SOLUTION INTRAVENOUS; SUBCUTANEOUS ONCE
Status: COMPLETED | OUTPATIENT
Start: 2018-05-14 | End: 2018-05-14

## 2018-05-14 RX ORDER — LIDOCAINE HYDROCHLORIDE 20 MG/ML
10 JELLY TOPICAL ONCE
Status: COMPLETED | OUTPATIENT
Start: 2018-05-14 | End: 2018-05-14

## 2018-05-14 RX ORDER — 0.9 % SODIUM CHLORIDE 0.9 %
17 VIAL (ML) INJECTION ONCE
Status: COMPLETED | OUTPATIENT
Start: 2018-05-14 | End: 2018-05-14

## 2018-05-14 RX ADMIN — 0.9 % SODIUM CHLORIDE 17 ML: 0.9 % VIAL (ML) INJECTION at 14:30:00

## 2018-05-14 RX ADMIN — HEPARIN SODIUM 40000 UNITS: 10000 INJECTION, SOLUTION INTRAVENOUS; SUBCUTANEOUS at 14:30:00

## 2018-05-14 RX ADMIN — LIDOCAINE HYDROCHLORIDE 10 ML: 20 JELLY TOPICAL at 14:30:00

## 2018-05-14 NOTE — PROCEDURES
.Patient here for instill. Patient reports feeling better for a few days after last instill but then reports the burning and pain has been bad this past week. Pt reports urgency/frequency has been better.   Considering PTNS in the fall for scheduling amanda

## 2018-05-16 ENCOUNTER — TELEPHONE (OUTPATIENT)
Dept: UROLOGY | Facility: HOSPITAL | Age: 58
End: 2018-05-16

## 2018-05-16 NOTE — TELEPHONE ENCOUNTER
Pt left a message on the voice mail last night, c/o feeling worse than before the instill, asking for a call back, called both her home and cell, left messages on both to call the office today

## 2018-05-21 ENCOUNTER — OFFICE VISIT (OUTPATIENT)
Dept: UROLOGY | Facility: HOSPITAL | Age: 58
End: 2018-05-21
Attending: OBSTETRICS & GYNECOLOGY
Payer: COMMERCIAL

## 2018-05-21 DIAGNOSIS — N30.10 INTERSTITIAL CYSTITIS: Primary | ICD-10-CM

## 2018-05-21 PROCEDURE — 81002 URINALYSIS NONAUTO W/O SCOPE: CPT

## 2018-05-21 PROCEDURE — 51700 IRRIGATION OF BLADDER: CPT

## 2018-05-21 RX ADMIN — 0.9 % SODIUM CHLORIDE 17 ML: 0.9 % VIAL (ML) INJECTION at 14:30:00

## 2018-05-21 RX ADMIN — LIDOCAINE HYDROCHLORIDE 10 ML: 20 JELLY TOPICAL at 14:30:00

## 2018-05-21 RX ADMIN — HEPARIN SODIUM 40000 UNITS: 10000 INJECTION, SOLUTION INTRAVENOUS; SUBCUTANEOUS at 14:30:00

## 2018-05-22 VITALS
SYSTOLIC BLOOD PRESSURE: 128 MMHG | DIASTOLIC BLOOD PRESSURE: 82 MMHG | WEIGHT: 187 LBS | HEIGHT: 65.5 IN | BODY MASS INDEX: 30.78 KG/M2

## 2018-05-22 RX ORDER — HEPARIN SODIUM 10000 [USP'U]/ML
40000 INJECTION, SOLUTION INTRAVENOUS; SUBCUTANEOUS ONCE
Status: COMPLETED | OUTPATIENT
Start: 2018-05-22 | End: 2018-05-21

## 2018-05-22 RX ORDER — 0.9 % SODIUM CHLORIDE 0.9 %
17 VIAL (ML) INJECTION ONCE
Status: COMPLETED | OUTPATIENT
Start: 2018-05-22 | End: 2018-05-21

## 2018-05-22 RX ORDER — LIDOCAINE HYDROCHLORIDE 20 MG/ML
10 JELLY TOPICAL ONCE
Status: COMPLETED | OUTPATIENT
Start: 2018-05-22 | End: 2018-05-21

## 2018-05-22 NOTE — PROCEDURES
.Patient here for instill. Patient reports feeling dysuria and back pain. Discussed future plans of PTNS. Pt doesn't have the time right now for weekly sessions, but would like to try in August. Pt hasn't started Oxybutynin yet.  Instill given per protocol

## 2018-06-15 ENCOUNTER — OFFICE VISIT (OUTPATIENT)
Dept: UROLOGY | Facility: HOSPITAL | Age: 58
End: 2018-06-15
Attending: OBSTETRICS & GYNECOLOGY
Payer: COMMERCIAL

## 2018-06-15 VITALS
HEIGHT: 65.5 IN | BODY MASS INDEX: 30.78 KG/M2 | DIASTOLIC BLOOD PRESSURE: 88 MMHG | SYSTOLIC BLOOD PRESSURE: 138 MMHG | WEIGHT: 187 LBS

## 2018-06-15 DIAGNOSIS — R39.15 URGENCY OF URINATION: ICD-10-CM

## 2018-06-15 DIAGNOSIS — N30.10 INTERSTITIAL CYSTITIS: Primary | ICD-10-CM

## 2018-06-15 DIAGNOSIS — R35.0 URINARY FREQUENCY: ICD-10-CM

## 2018-06-15 PROCEDURE — 51700 IRRIGATION OF BLADDER: CPT

## 2018-06-15 PROCEDURE — 64566 NEUROELTRD STIM POST TIBIAL: CPT

## 2018-06-15 PROCEDURE — 81002 URINALYSIS NONAUTO W/O SCOPE: CPT

## 2018-06-15 RX ORDER — 0.9 % SODIUM CHLORIDE 0.9 %
17 VIAL (ML) INJECTION ONCE
Status: COMPLETED | OUTPATIENT
Start: 2018-06-15 | End: 2018-06-15

## 2018-06-15 RX ORDER — LIDOCAINE HYDROCHLORIDE 20 MG/ML
10 JELLY TOPICAL ONCE
Status: COMPLETED | OUTPATIENT
Start: 2018-06-15 | End: 2018-06-15

## 2018-06-15 RX ORDER — HEPARIN SODIUM 10000 [USP'U]/ML
40000 INJECTION, SOLUTION INTRAVENOUS; SUBCUTANEOUS ONCE
Status: COMPLETED | OUTPATIENT
Start: 2018-06-15 | End: 2018-06-15

## 2018-06-15 RX ADMIN — HEPARIN SODIUM 40000 UNITS: 10000 INJECTION, SOLUTION INTRAVENOUS; SUBCUTANEOUS at 14:30:00

## 2018-06-15 RX ADMIN — LIDOCAINE HYDROCHLORIDE 10 ML: 20 JELLY TOPICAL at 14:30:00

## 2018-06-15 RX ADMIN — 0.9 % SODIUM CHLORIDE 17 ML: 0.9 % VIAL (ML) INJECTION at 14:30:00

## 2018-06-26 ENCOUNTER — OFFICE VISIT (OUTPATIENT)
Dept: UROLOGY | Facility: HOSPITAL | Age: 58
End: 2018-06-26
Attending: OBSTETRICS & GYNECOLOGY
Payer: COMMERCIAL

## 2018-06-26 VITALS
BODY MASS INDEX: 31.16 KG/M2 | DIASTOLIC BLOOD PRESSURE: 80 MMHG | SYSTOLIC BLOOD PRESSURE: 130 MMHG | HEIGHT: 65 IN | WEIGHT: 187 LBS

## 2018-06-26 DIAGNOSIS — R35.0 URINARY FREQUENCY: ICD-10-CM

## 2018-06-26 DIAGNOSIS — N30.10 INTERSTITIAL CYSTITIS: Primary | ICD-10-CM

## 2018-06-26 PROCEDURE — 51700 IRRIGATION OF BLADDER: CPT

## 2018-06-26 PROCEDURE — 81002 URINALYSIS NONAUTO W/O SCOPE: CPT

## 2018-06-26 RX ORDER — LIDOCAINE HYDROCHLORIDE 20 MG/ML
10 JELLY TOPICAL ONCE
Status: COMPLETED | OUTPATIENT
Start: 2018-06-26 | End: 2018-06-26

## 2018-06-26 RX ORDER — 0.9 % SODIUM CHLORIDE 0.9 %
17 VIAL (ML) INJECTION ONCE
Status: COMPLETED | OUTPATIENT
Start: 2018-06-26 | End: 2018-06-26

## 2018-06-26 RX ORDER — HEPARIN SODIUM 10000 [USP'U]/ML
40000 INJECTION, SOLUTION INTRAVENOUS; SUBCUTANEOUS ONCE
Status: COMPLETED | OUTPATIENT
Start: 2018-06-26 | End: 2018-06-26

## 2018-06-26 RX ADMIN — LIDOCAINE HYDROCHLORIDE 10 ML: 20 JELLY TOPICAL at 11:30:00

## 2018-06-26 RX ADMIN — HEPARIN SODIUM 40000 UNITS: 10000 INJECTION, SOLUTION INTRAVENOUS; SUBCUTANEOUS at 11:30:00

## 2018-06-26 RX ADMIN — 0.9 % SODIUM CHLORIDE 17 ML: 0.9 % VIAL (ML) INJECTION at 11:30:00

## 2018-06-26 NOTE — PROCEDURES
.Patient here for instill . Patient reports feeling bladder pain. Instill given per protocol. Patient catheterized for a residual of 30ml. Chemstrip performed. Patient tolerated procedure well. Pt will call for next instill appointment.

## 2018-07-19 ENCOUNTER — OFFICE VISIT (OUTPATIENT)
Dept: UROLOGY | Facility: HOSPITAL | Age: 58
End: 2018-07-19
Attending: OBSTETRICS & GYNECOLOGY
Payer: COMMERCIAL

## 2018-07-19 VITALS
SYSTOLIC BLOOD PRESSURE: 152 MMHG | BODY MASS INDEX: 31.16 KG/M2 | HEIGHT: 65 IN | WEIGHT: 187 LBS | DIASTOLIC BLOOD PRESSURE: 80 MMHG

## 2018-07-19 DIAGNOSIS — N30.10 INTERSTITIAL CYSTITIS: Primary | ICD-10-CM

## 2018-07-19 DIAGNOSIS — R39.15 URINARY URGENCY: ICD-10-CM

## 2018-07-19 DIAGNOSIS — N95.2 POSTMENOPAUSAL ATROPHIC VAGINITIS: ICD-10-CM

## 2018-07-19 PROCEDURE — 51700 IRRIGATION OF BLADDER: CPT

## 2018-07-19 PROCEDURE — 81002 URINALYSIS NONAUTO W/O SCOPE: CPT

## 2018-07-19 RX ORDER — LIDOCAINE HYDROCHLORIDE 20 MG/ML
10 JELLY TOPICAL ONCE
Status: COMPLETED | OUTPATIENT
Start: 2018-07-19 | End: 2018-07-19

## 2018-07-19 RX ORDER — HEPARIN SODIUM 10000 [USP'U]/ML
40000 INJECTION, SOLUTION INTRAVENOUS; SUBCUTANEOUS ONCE
Status: COMPLETED | OUTPATIENT
Start: 2018-07-19 | End: 2018-07-19

## 2018-07-19 RX ORDER — 0.9 % SODIUM CHLORIDE 0.9 %
17 VIAL (ML) INJECTION ONCE
Status: COMPLETED | OUTPATIENT
Start: 2018-07-19 | End: 2018-07-19

## 2018-07-19 RX ADMIN — HEPARIN SODIUM 40000 UNITS: 10000 INJECTION, SOLUTION INTRAVENOUS; SUBCUTANEOUS at 14:24:00

## 2018-07-19 RX ADMIN — 0.9 % SODIUM CHLORIDE 17 ML: 0.9 % VIAL (ML) INJECTION at 14:24:00

## 2018-07-19 RX ADMIN — LIDOCAINE HYDROCHLORIDE 10 ML: 20 JELLY TOPICAL at 14:24:00

## 2018-07-19 NOTE — PROGRESS NOTES
..Patient here for instill. Patient reports feeling better for a few days after the instill, still feels stress is part of her bladder pain, also states has not started the Oxybutynin yet, also \"forgets\" to take Estrace on a regular basis. , encouraged d

## 2018-08-03 ENCOUNTER — OFFICE VISIT (OUTPATIENT)
Dept: UROLOGY | Facility: HOSPITAL | Age: 58
End: 2018-08-03
Attending: OBSTETRICS & GYNECOLOGY
Payer: COMMERCIAL

## 2018-08-03 VITALS
BODY MASS INDEX: 31.16 KG/M2 | TEMPERATURE: 98 F | DIASTOLIC BLOOD PRESSURE: 88 MMHG | HEIGHT: 65 IN | WEIGHT: 187 LBS | SYSTOLIC BLOOD PRESSURE: 128 MMHG

## 2018-08-03 DIAGNOSIS — N30.10 INTERSTITIAL CYSTITIS: Primary | ICD-10-CM

## 2018-08-03 LAB
BLOOD URINE: NEGATIVE
CONTROL RUN WITHIN 24 HOURS?: YES
NITRITE URINE: NEGATIVE

## 2018-08-03 PROCEDURE — 51700 IRRIGATION OF BLADDER: CPT

## 2018-08-03 PROCEDURE — 81002 URINALYSIS NONAUTO W/O SCOPE: CPT

## 2018-08-03 RX ORDER — ESTRADIOL 0.1 MG/G
CREAM VAGINAL
Qty: 1 TUBE | Refills: 3 | Status: SHIPPED | OUTPATIENT
Start: 2018-08-03 | End: 2018-09-19

## 2018-08-03 RX ORDER — LIDOCAINE HYDROCHLORIDE 20 MG/ML
10 JELLY TOPICAL ONCE
Status: COMPLETED | OUTPATIENT
Start: 2018-08-03 | End: 2018-08-03

## 2018-08-03 RX ORDER — 0.9 % SODIUM CHLORIDE 0.9 %
17 VIAL (ML) INJECTION ONCE
Status: COMPLETED | OUTPATIENT
Start: 2018-08-03 | End: 2018-08-03

## 2018-08-03 RX ORDER — HEPARIN SODIUM 10000 [USP'U]/ML
40000 INJECTION, SOLUTION INTRAVENOUS; SUBCUTANEOUS ONCE
Status: COMPLETED | OUTPATIENT
Start: 2018-08-03 | End: 2018-08-03

## 2018-08-03 RX ADMIN — 0.9 % SODIUM CHLORIDE 17 ML: 0.9 % VIAL (ML) INJECTION at 14:00:00

## 2018-08-03 RX ADMIN — LIDOCAINE HYDROCHLORIDE 10 ML: 20 JELLY TOPICAL at 14:00:00

## 2018-08-03 RX ADMIN — HEPARIN SODIUM 40000 UNITS: 10000 INJECTION, SOLUTION INTRAVENOUS; SUBCUTANEOUS at 14:00:00

## 2018-08-03 NOTE — PROCEDURES
.Patient here for instill. Patient reports feeling bladder pain. Instill given per protocol. Patient catheterized for a residual of 20 ml. Chemstrip performed. Patient tolerated procedure well. Next instill scheduled for next week.

## 2018-08-04 ENCOUNTER — APPOINTMENT (OUTPATIENT)
Dept: GENERAL RADIOLOGY | Age: 58
End: 2018-08-04
Attending: EMERGENCY MEDICINE
Payer: COMMERCIAL

## 2018-08-04 ENCOUNTER — HOSPITAL ENCOUNTER (OUTPATIENT)
Age: 58
Discharge: HOME | End: 2018-08-04
Attending: EMERGENCY MEDICINE
Payer: COMMERCIAL

## 2018-08-04 VITALS
OXYGEN SATURATION: 100 % | RESPIRATION RATE: 18 BRPM | TEMPERATURE: 98 F | HEIGHT: 67 IN | DIASTOLIC BLOOD PRESSURE: 78 MMHG | BODY MASS INDEX: 22.29 KG/M2 | HEART RATE: 101 BPM | SYSTOLIC BLOOD PRESSURE: 133 MMHG | WEIGHT: 142 LBS

## 2018-08-04 DIAGNOSIS — S60.222A CONTUSION OF LEFT HAND, INITIAL ENCOUNTER: Primary | ICD-10-CM

## 2018-08-04 PROCEDURE — 99203 OFFICE O/P NEW LOW 30 MIN: CPT

## 2018-08-04 PROCEDURE — 73130 X-RAY EXAM OF HAND: CPT | Performed by: EMERGENCY MEDICINE

## 2018-08-04 NOTE — ED PROVIDER NOTES
Patient Seen in: [address]    History   Patient presents with:  Upper Extremity Injury (musculoskeletal)    Stated Complaint: lt hand pain    HPI    Patient states she injured her left hand yesterday during an episode o other systems reviewed and negative except as noted above.     Physical Exam   ED Triage Vitals [08/04/18 1237]  BP: 160/82  Pulse: 108  Resp: 18  Temp: 97.9 °F (36.6 °C)  Temp src: Oral  SpO2: 100 %  O2 Device: None (Room air)    Current:/82   Pulse MD  [address]  [phone]    In 10 days  if symptoms do not improve

## 2018-08-09 ENCOUNTER — OFFICE VISIT (OUTPATIENT)
Dept: UROLOGY | Facility: HOSPITAL | Age: 58
End: 2018-08-09
Attending: OBSTETRICS & GYNECOLOGY
Payer: COMMERCIAL

## 2018-08-09 VITALS
BODY MASS INDEX: 22.29 KG/M2 | SYSTOLIC BLOOD PRESSURE: 124 MMHG | HEIGHT: 67 IN | WEIGHT: 142 LBS | DIASTOLIC BLOOD PRESSURE: 78 MMHG

## 2018-08-09 DIAGNOSIS — R35.0 URINARY FREQUENCY: ICD-10-CM

## 2018-08-09 DIAGNOSIS — R39.15 URINARY URGENCY: ICD-10-CM

## 2018-08-09 DIAGNOSIS — N30.10 INTERSTITIAL CYSTITIS: Primary | ICD-10-CM

## 2018-08-09 PROCEDURE — 81002 URINALYSIS NONAUTO W/O SCOPE: CPT

## 2018-08-09 PROCEDURE — 51700 IRRIGATION OF BLADDER: CPT

## 2018-08-09 RX ORDER — LIDOCAINE HYDROCHLORIDE 20 MG/ML
10 JELLY TOPICAL ONCE
Status: DISCONTINUED | OUTPATIENT
Start: 2018-08-09 | End: 2018-08-09 | Stop reason: ALTCHOICE

## 2018-08-09 RX ORDER — HEPARIN SODIUM 10000 [USP'U]/ML
40000 INJECTION, SOLUTION INTRAVENOUS; SUBCUTANEOUS ONCE
Status: DISCONTINUED | OUTPATIENT
Start: 2018-08-09 | End: 2018-08-09 | Stop reason: ALTCHOICE

## 2018-08-09 RX ORDER — 0.9 % SODIUM CHLORIDE 0.9 %
17 VIAL (ML) INJECTION ONCE
Status: DISCONTINUED | OUTPATIENT
Start: 2018-08-09 | End: 2018-08-09 | Stop reason: ALTCHOICE

## 2018-08-09 RX ADMIN — LIDOCAINE HYDROCHLORIDE 10 ML: 20 JELLY TOPICAL at 13:21:00

## 2018-08-09 RX ADMIN — 0.9 % SODIUM CHLORIDE 17 ML: 0.9 % VIAL (ML) INJECTION at 13:21:00

## 2018-08-09 RX ADMIN — HEPARIN SODIUM 40000 UNITS: 10000 INJECTION, SOLUTION INTRAVENOUS; SUBCUTANEOUS at 13:21:00

## 2018-08-09 NOTE — PROCEDURES
.Patient here for instill. Patient reports feeling bladder pain. Instill given per protocol. Patient catheterized for a residual of 80 ml. Chemstrip performed. Patient tolerated procedure well. Next instill scheduled for PRN.

## 2018-08-24 ENCOUNTER — OFFICE VISIT (OUTPATIENT)
Dept: UROLOGY | Facility: HOSPITAL | Age: 58
End: 2018-08-24
Attending: OBSTETRICS & GYNECOLOGY
Payer: COMMERCIAL

## 2018-08-24 VITALS — DIASTOLIC BLOOD PRESSURE: 88 MMHG | SYSTOLIC BLOOD PRESSURE: 132 MMHG

## 2018-08-24 DIAGNOSIS — N30.10 CHRONIC INTERSTITIAL CYSTITIS: Primary | ICD-10-CM

## 2018-08-24 PROCEDURE — 81002 URINALYSIS NONAUTO W/O SCOPE: CPT

## 2018-08-24 PROCEDURE — 51700 IRRIGATION OF BLADDER: CPT

## 2018-08-24 RX ORDER — LIDOCAINE HYDROCHLORIDE 20 MG/ML
10 JELLY TOPICAL ONCE
Status: DISCONTINUED | OUTPATIENT
Start: 2018-08-24 | End: 2018-09-19

## 2018-08-24 RX ORDER — HEPARIN SODIUM 10000 [USP'U]/ML
40000 INJECTION, SOLUTION INTRAVENOUS; SUBCUTANEOUS ONCE
Status: DISCONTINUED | OUTPATIENT
Start: 2018-08-24 | End: 2018-10-02 | Stop reason: ALTCHOICE

## 2018-08-24 RX ORDER — 0.9 % SODIUM CHLORIDE 0.9 %
17 VIAL (ML) INJECTION ONCE
Status: DISCONTINUED | OUTPATIENT
Start: 2018-08-24 | End: 2018-10-02 | Stop reason: ALTCHOICE

## 2018-08-24 NOTE — PROCEDURES
.Patient here for instill ( RESCUE). Patient reports feeling that she is in a flare. Instill given per protocol. Patient catheterized for a residual of  25 ml. Chemstrip performed - negative indicators. Patient tolerated procedure well.   Patient will

## 2018-09-05 ENCOUNTER — OFFICE VISIT (OUTPATIENT)
Dept: UROLOGY | Facility: HOSPITAL | Age: 58
End: 2018-09-05
Attending: OBSTETRICS & GYNECOLOGY
Payer: COMMERCIAL

## 2018-09-05 VITALS
SYSTOLIC BLOOD PRESSURE: 122 MMHG | HEIGHT: 67 IN | DIASTOLIC BLOOD PRESSURE: 70 MMHG | WEIGHT: 142 LBS | BODY MASS INDEX: 22.29 KG/M2

## 2018-09-05 DIAGNOSIS — R39.15 URINARY URGENCY: ICD-10-CM

## 2018-09-05 DIAGNOSIS — N30.10 CHRONIC INTERSTITIAL CYSTITIS: Primary | ICD-10-CM

## 2018-09-05 PROCEDURE — 51700 IRRIGATION OF BLADDER: CPT

## 2018-09-05 PROCEDURE — 81002 URINALYSIS NONAUTO W/O SCOPE: CPT

## 2018-09-05 RX ORDER — LIDOCAINE HYDROCHLORIDE 20 MG/ML
10 JELLY TOPICAL ONCE
Status: COMPLETED | OUTPATIENT
Start: 2018-09-05 | End: 2018-09-05

## 2018-09-05 RX ORDER — HEPARIN SODIUM 10000 [USP'U]/ML
40000 INJECTION, SOLUTION INTRAVENOUS; SUBCUTANEOUS ONCE
Status: COMPLETED | OUTPATIENT
Start: 2018-09-05 | End: 2018-09-05

## 2018-09-05 RX ORDER — 0.9 % SODIUM CHLORIDE 0.9 %
17 VIAL (ML) INJECTION ONCE
Status: COMPLETED | OUTPATIENT
Start: 2018-09-05 | End: 2018-09-05

## 2018-09-05 RX ADMIN — LIDOCAINE HYDROCHLORIDE 10 ML: 20 JELLY TOPICAL at 14:20:00

## 2018-09-05 RX ADMIN — HEPARIN SODIUM 40000 UNITS: 10000 INJECTION, SOLUTION INTRAVENOUS; SUBCUTANEOUS at 14:20:00

## 2018-09-05 RX ADMIN — 0.9 % SODIUM CHLORIDE 17 ML: 0.9 % VIAL (ML) INJECTION at 14:20:00

## 2018-09-05 NOTE — PROGRESS NOTES
..Patient here for instill. Patient reports feeling increassed bladder pain for the past 2-3 days, states she had to run errands last week after her instill and was not able to hold for 2 hours, she will go home right after this instill.   Instill given pe

## 2018-09-19 ENCOUNTER — OFFICE VISIT (OUTPATIENT)
Dept: UROLOGY | Facility: HOSPITAL | Age: 58
End: 2018-09-19
Attending: OBSTETRICS & GYNECOLOGY
Payer: COMMERCIAL

## 2018-09-19 VITALS
BODY MASS INDEX: 22.29 KG/M2 | DIASTOLIC BLOOD PRESSURE: 78 MMHG | HEIGHT: 67 IN | WEIGHT: 142 LBS | SYSTOLIC BLOOD PRESSURE: 122 MMHG

## 2018-09-19 DIAGNOSIS — N30.10 CHRONIC INTERSTITIAL CYSTITIS: Primary | ICD-10-CM

## 2018-09-19 PROCEDURE — 51700 IRRIGATION OF BLADDER: CPT

## 2018-09-19 PROCEDURE — 81002 URINALYSIS NONAUTO W/O SCOPE: CPT

## 2018-09-19 RX ORDER — PHENTERMINE HYDROCHLORIDE 37.5 MG/1
37.5 TABLET ORAL
COMMUNITY
End: 2018-10-02

## 2018-09-19 RX ORDER — 0.9 % SODIUM CHLORIDE 0.9 %
17 VIAL (ML) INJECTION ONCE
Status: COMPLETED | OUTPATIENT
Start: 2018-09-19 | End: 2018-09-19

## 2018-09-19 RX ORDER — CLONIDINE HYDROCHLORIDE 0.1 MG/1
0.1 TABLET ORAL AS NEEDED
COMMUNITY
End: 2018-12-19

## 2018-09-19 RX ORDER — LIDOCAINE HYDROCHLORIDE 20 MG/ML
10 JELLY TOPICAL ONCE
Status: COMPLETED | OUTPATIENT
Start: 2018-09-19 | End: 2018-09-19

## 2018-09-19 RX ORDER — HEPARIN SODIUM 10000 [USP'U]/ML
40000 INJECTION, SOLUTION INTRAVENOUS; SUBCUTANEOUS ONCE
Status: COMPLETED | OUTPATIENT
Start: 2018-09-19 | End: 2018-09-19

## 2018-09-19 RX ADMIN — HEPARIN SODIUM 40000 UNITS: 10000 INJECTION, SOLUTION INTRAVENOUS; SUBCUTANEOUS at 15:15:00

## 2018-09-19 RX ADMIN — 0.9 % SODIUM CHLORIDE 17 ML: 0.9 % VIAL (ML) INJECTION at 15:15:00

## 2018-09-19 RX ADMIN — LIDOCAINE HYDROCHLORIDE 10 ML: 20 JELLY TOPICAL at 15:15:00

## 2018-09-19 NOTE — PROCEDURES
.Patient here for instill. Patient reports feeling bladder cramping/pain. Pt very stressed right now w/ moving son to [place]. Instill given per protocol. Patient catheterized for a residual of 5 ml. Chemstrip performed. Patient tolerated procedure well.

## 2018-10-02 ENCOUNTER — OFFICE VISIT (OUTPATIENT)
Dept: UROLOGY | Facility: HOSPITAL | Age: 58
End: 2018-10-02
Attending: OBSTETRICS & GYNECOLOGY
Payer: COMMERCIAL

## 2018-10-02 VITALS
SYSTOLIC BLOOD PRESSURE: 122 MMHG | DIASTOLIC BLOOD PRESSURE: 82 MMHG | BODY MASS INDEX: 22.29 KG/M2 | WEIGHT: 142 LBS | HEIGHT: 67 IN

## 2018-10-02 DIAGNOSIS — N30.10 CHRONIC INTERSTITIAL CYSTITIS: Primary | ICD-10-CM

## 2018-10-02 DIAGNOSIS — R39.15 URINARY URGENCY: ICD-10-CM

## 2018-10-02 DIAGNOSIS — R35.0 URINARY FREQUENCY: ICD-10-CM

## 2018-10-02 PROCEDURE — 81002 URINALYSIS NONAUTO W/O SCOPE: CPT

## 2018-10-02 PROCEDURE — 51700 IRRIGATION OF BLADDER: CPT

## 2018-10-02 PROCEDURE — 99211 OFF/OP EST MAY X REQ PHY/QHP: CPT

## 2018-10-02 RX ORDER — HEPARIN SODIUM 10000 [USP'U]/ML
40000 INJECTION, SOLUTION INTRAVENOUS; SUBCUTANEOUS ONCE
Status: COMPLETED | OUTPATIENT
Start: 2018-10-02 | End: 2018-10-02

## 2018-10-02 RX ORDER — LIDOCAINE HYDROCHLORIDE 20 MG/ML
10 JELLY TOPICAL ONCE
Status: COMPLETED | OUTPATIENT
Start: 2018-10-02 | End: 2018-10-02

## 2018-10-02 RX ORDER — 0.9 % SODIUM CHLORIDE 0.9 %
17 VIAL (ML) INJECTION ONCE
Status: COMPLETED | OUTPATIENT
Start: 2018-10-02 | End: 2018-10-02

## 2018-10-02 RX ADMIN — LIDOCAINE HYDROCHLORIDE 10 ML: 20 JELLY TOPICAL at 14:26:00

## 2018-10-02 RX ADMIN — HEPARIN SODIUM 40000 UNITS: 10000 INJECTION, SOLUTION INTRAVENOUS; SUBCUTANEOUS at 14:26:00

## 2018-10-02 RX ADMIN — 0.9 % SODIUM CHLORIDE 17 ML: 0.9 % VIAL (ML) INJECTION at 14:26:00

## 2018-10-02 NOTE — PROGRESS NOTES
Patient presents to follow up bladder sx    She is currently using instills prn  Didn't start oxybutynin  Some relief with instills, but not long term  Wants to schedule botox  Tolerates CISC risks    Lots of difficulty with foot surgery/infxn  Significant Botox Procedure      Return for post op.     [address], [place], [place], [address] [facility]

## 2018-10-02 NOTE — PROGRESS NOTES
.Patient here for instill. Patient reports feeling bladder pain and pelvic cramping. Instill given per protocol. Patient catheterized for a residual of 160 ml. Chemstrip performed. Patient tolerated procedure well. Next instill scheduled for PRN.

## 2018-10-15 ENCOUNTER — TELEPHONE (OUTPATIENT)
Dept: UROLOGY | Facility: HOSPITAL | Age: 58
End: 2018-10-15

## 2018-10-15 NOTE — TELEPHONE ENCOUNTER
Pt called and LVM requesting instill appt and requesting refill for compounding pharmacy vaginal valium suppositories. Called pt and [facility], prescription pending Dr. Marybeth Baig tomorrow. Offered appt time on Wednesday, 10-17-18 for RN bladder instill.   Pt

## 2018-11-05 ENCOUNTER — TELEPHONE (OUTPATIENT)
Dept: UROLOGY | Facility: HOSPITAL | Age: 58
End: 2018-11-05

## 2018-11-05 NOTE — TELEPHONE ENCOUNTER
Pt called to notify us that she received her approval letter for Botox, pt would like to schedule. Called pt and [facility] for pt to call Laura to coordinate surgery. Informed pt she will need to submit urine sample 1 week prior to Botox.   Pt to call our offi

## 2018-11-09 DIAGNOSIS — E03.9 HYPOTHYROIDISM, UNSPECIFIED TYPE: ICD-10-CM

## 2018-11-09 RX ORDER — LEVOTHYROXINE SODIUM 150 UG/1
TABLET ORAL
Qty: 90 TABLET | Refills: 1 | Status: SHIPPED | OUTPATIENT
Start: 2018-11-09 | End: 2019-12-04

## 2018-11-12 ENCOUNTER — OFFICE VISIT (OUTPATIENT)
Dept: UROLOGY | Facility: HOSPITAL | Age: 58
End: 2018-11-12
Attending: OBSTETRICS & GYNECOLOGY
Payer: COMMERCIAL

## 2018-11-12 VITALS
HEIGHT: 67 IN | SYSTOLIC BLOOD PRESSURE: 124 MMHG | WEIGHT: 142 LBS | BODY MASS INDEX: 22.29 KG/M2 | DIASTOLIC BLOOD PRESSURE: 78 MMHG

## 2018-11-12 DIAGNOSIS — N89.8 VAGINAL ODOR: ICD-10-CM

## 2018-11-12 DIAGNOSIS — R39.15 URGENCY OF URINATION: ICD-10-CM

## 2018-11-12 DIAGNOSIS — R35.0 FREQUENCY OF URINATION: ICD-10-CM

## 2018-11-12 DIAGNOSIS — N30.10 IC (INTERSTITIAL CYSTITIS): Primary | ICD-10-CM

## 2018-11-12 PROCEDURE — 87660 TRICHOMONAS VAGIN DIR PROBE: CPT

## 2018-11-12 PROCEDURE — 81002 URINALYSIS NONAUTO W/O SCOPE: CPT

## 2018-11-12 PROCEDURE — 87480 CANDIDA DNA DIR PROBE: CPT

## 2018-11-12 PROCEDURE — 87510 GARDNER VAG DNA DIR PROBE: CPT

## 2018-11-12 PROCEDURE — 51700 IRRIGATION OF BLADDER: CPT

## 2018-11-12 RX ORDER — LIDOCAINE HYDROCHLORIDE 20 MG/ML
10 JELLY TOPICAL ONCE
Status: COMPLETED | OUTPATIENT
Start: 2018-11-12 | End: 2018-11-12

## 2018-11-12 RX ORDER — 0.9 % SODIUM CHLORIDE 0.9 %
17 VIAL (ML) INJECTION ONCE
Status: COMPLETED | OUTPATIENT
Start: 2018-11-12 | End: 2018-11-12

## 2018-11-12 RX ORDER — HEPARIN SODIUM 10000 [USP'U]/ML
40000 INJECTION, SOLUTION INTRAVENOUS; SUBCUTANEOUS ONCE
Status: COMPLETED | OUTPATIENT
Start: 2018-11-12 | End: 2018-11-12

## 2018-11-12 RX ADMIN — HEPARIN SODIUM 40000 UNITS: 10000 INJECTION, SOLUTION INTRAVENOUS; SUBCUTANEOUS at 14:59:00

## 2018-11-12 RX ADMIN — LIDOCAINE HYDROCHLORIDE 10 ML: 20 JELLY TOPICAL at 14:59:00

## 2018-11-12 RX ADMIN — 0.9 % SODIUM CHLORIDE 17 ML: 0.9 % VIAL (ML) INJECTION at 14:59:00

## 2018-11-12 NOTE — PROCEDURES
.Patient here for instill. Patient reports feeling vaginal odor, burning and frequency. Pt had UUI w/ pain this wk. Pt scheduled for botox 12/6/18. Instill given per protocol. Patient catheterized for a residual of  30 ml. Chemstrip performed.   Patient

## 2018-11-14 ENCOUNTER — TELEPHONE (OUTPATIENT)
Dept: UROLOGY | Facility: HOSPITAL | Age: 58
End: 2018-11-14

## 2018-11-30 ENCOUNTER — OFFICE VISIT (OUTPATIENT)
Dept: UROLOGY | Facility: HOSPITAL | Age: 58
End: 2018-11-30
Attending: OBSTETRICS & GYNECOLOGY
Payer: COMMERCIAL

## 2018-11-30 VITALS — BODY MASS INDEX: 27 KG/M2 | RESPIRATION RATE: 18 BRPM | WEIGHT: 172 LBS | HEIGHT: 67 IN

## 2018-11-30 DIAGNOSIS — R39.15 URGENCY OF URINATION: ICD-10-CM

## 2018-11-30 DIAGNOSIS — N30.10 IC (INTERSTITIAL CYSTITIS): Primary | ICD-10-CM

## 2018-11-30 DIAGNOSIS — R35.0 FREQUENCY OF URINATION: ICD-10-CM

## 2018-11-30 PROCEDURE — 51700 IRRIGATION OF BLADDER: CPT

## 2018-11-30 PROCEDURE — 87086 URINE CULTURE/COLONY COUNT: CPT

## 2018-11-30 PROCEDURE — 81002 URINALYSIS NONAUTO W/O SCOPE: CPT

## 2018-11-30 RX ORDER — 0.9 % SODIUM CHLORIDE 0.9 %
17 VIAL (ML) INJECTION ONCE
Status: COMPLETED | OUTPATIENT
Start: 2018-11-30 | End: 2018-11-30

## 2018-11-30 RX ORDER — LIDOCAINE HYDROCHLORIDE 20 MG/ML
10 JELLY TOPICAL ONCE
Status: COMPLETED | OUTPATIENT
Start: 2018-11-30 | End: 2018-11-30

## 2018-11-30 RX ORDER — HEPARIN SODIUM 10000 [USP'U]/ML
40000 INJECTION, SOLUTION INTRAVENOUS; SUBCUTANEOUS ONCE
Status: COMPLETED | OUTPATIENT
Start: 2018-11-30 | End: 2018-11-30

## 2018-11-30 RX ADMIN — 0.9 % SODIUM CHLORIDE 17 ML: 0.9 % VIAL (ML) INJECTION at 13:30:00

## 2018-11-30 RX ADMIN — LIDOCAINE HYDROCHLORIDE 10 ML: 20 JELLY TOPICAL at 13:30:00

## 2018-11-30 RX ADMIN — HEPARIN SODIUM 40000 UNITS: 10000 INJECTION, SOLUTION INTRAVENOUS; SUBCUTANEOUS at 13:30:00

## 2018-11-30 NOTE — PROCEDURES
.Patient here for instill. Voided specimen collected to send for culture for upcoming scheduled Botox procedure 12-6-18. Patient reports feeling more discomfort and urgency. Instill given per protocol. Patient catheterized for a residual of 5 ml.   Chem T&A

## 2018-12-05 ENCOUNTER — TELEPHONE (OUTPATIENT)
Dept: UROLOGY | Facility: HOSPITAL | Age: 58
End: 2018-12-05

## 2018-12-05 NOTE — TELEPHONE ENCOUNTER
Pt called w/ neg urine cx results. Pt verbalized an understanding. Pt very anxious about surgery tmrw. Discussed post op feelings and sx. Pt verbalized an understanding.

## 2018-12-10 ENCOUNTER — NURSE ONLY (OUTPATIENT)
Dept: UROLOGY | Facility: HOSPITAL | Age: 58
End: 2018-12-10
Attending: OBSTETRICS & GYNECOLOGY
Payer: COMMERCIAL

## 2018-12-10 VITALS
SYSTOLIC BLOOD PRESSURE: 126 MMHG | WEIGHT: 172 LBS | BODY MASS INDEX: 27 KG/M2 | HEIGHT: 67 IN | DIASTOLIC BLOOD PRESSURE: 76 MMHG

## 2018-12-10 DIAGNOSIS — N30.10 IC (INTERSTITIAL CYSTITIS): Primary | ICD-10-CM

## 2018-12-10 DIAGNOSIS — R39.15 URGENCY OF URINATION: ICD-10-CM

## 2018-12-10 DIAGNOSIS — R35.0 FREQUENCY OF URINATION: ICD-10-CM

## 2018-12-10 PROCEDURE — 51700 IRRIGATION OF BLADDER: CPT

## 2018-12-10 PROCEDURE — 81002 URINALYSIS NONAUTO W/O SCOPE: CPT

## 2018-12-10 RX ORDER — TRAZODONE HYDROCHLORIDE 50 MG/1
100 TABLET ORAL NIGHTLY
COMMUNITY
End: 2019-12-26

## 2018-12-10 RX ORDER — LIDOCAINE HYDROCHLORIDE 20 MG/ML
10 JELLY TOPICAL ONCE
Status: COMPLETED | OUTPATIENT
Start: 2018-12-10 | End: 2018-12-10

## 2018-12-10 RX ORDER — 0.9 % SODIUM CHLORIDE 0.9 %
17 VIAL (ML) INJECTION ONCE
Status: COMPLETED | OUTPATIENT
Start: 2018-12-10 | End: 2018-12-10

## 2018-12-10 RX ORDER — OXYBUTYNIN CHLORIDE 5 MG/1
5 TABLET, EXTENDED RELEASE ORAL DAILY
Qty: 30 TABLET | Refills: 2 | Status: SHIPPED | OUTPATIENT
Start: 2018-12-10 | End: 2018-12-19

## 2018-12-10 RX ORDER — HEPARIN SODIUM 10000 [USP'U]/ML
40000 INJECTION, SOLUTION INTRAVENOUS; SUBCUTANEOUS ONCE
Status: COMPLETED | OUTPATIENT
Start: 2018-12-10 | End: 2018-12-10

## 2018-12-10 RX ADMIN — HEPARIN SODIUM 40000 UNITS: 10000 INJECTION, SOLUTION INTRAVENOUS; SUBCUTANEOUS at 15:02:00

## 2018-12-10 RX ADMIN — 0.9 % SODIUM CHLORIDE 17 ML: 0.9 % VIAL (ML) INJECTION at 15:02:00

## 2018-12-10 RX ADMIN — LIDOCAINE HYDROCHLORIDE 10 ML: 20 JELLY TOPICAL at 15:02:00

## 2018-12-19 ENCOUNTER — OFFICE VISIT (OUTPATIENT)
Dept: UROLOGY | Facility: HOSPITAL | Age: 58
End: 2018-12-19
Attending: OBSTETRICS & GYNECOLOGY
Payer: COMMERCIAL

## 2018-12-19 VITALS
DIASTOLIC BLOOD PRESSURE: 78 MMHG | BODY MASS INDEX: 27 KG/M2 | SYSTOLIC BLOOD PRESSURE: 124 MMHG | HEIGHT: 67 IN | WEIGHT: 172 LBS

## 2018-12-19 DIAGNOSIS — N30.10 IC (INTERSTITIAL CYSTITIS): Primary | ICD-10-CM

## 2018-12-19 DIAGNOSIS — R35.0 FREQUENCY OF URINATION: ICD-10-CM

## 2018-12-19 DIAGNOSIS — R39.15 URGENCY OF URINATION: ICD-10-CM

## 2018-12-19 PROCEDURE — 51700 IRRIGATION OF BLADDER: CPT

## 2018-12-19 PROCEDURE — 81002 URINALYSIS NONAUTO W/O SCOPE: CPT

## 2018-12-19 RX ORDER — HEPARIN SODIUM 10000 [USP'U]/ML
40000 INJECTION, SOLUTION INTRAVENOUS; SUBCUTANEOUS ONCE
Status: COMPLETED | OUTPATIENT
Start: 2018-12-19 | End: 2018-12-19

## 2018-12-19 RX ORDER — 0.9 % SODIUM CHLORIDE 0.9 %
17 VIAL (ML) INJECTION ONCE
Status: COMPLETED | OUTPATIENT
Start: 2018-12-19 | End: 2018-12-19

## 2018-12-19 RX ORDER — AMITRIPTYLINE HYDROCHLORIDE 10 MG/1
10 TABLET, FILM COATED ORAL NIGHTLY
COMMUNITY
End: 2019-05-07

## 2018-12-19 RX ORDER — LIDOCAINE HYDROCHLORIDE 20 MG/ML
10 JELLY TOPICAL ONCE
Status: COMPLETED | OUTPATIENT
Start: 2018-12-19 | End: 2018-12-19

## 2018-12-19 RX ADMIN — HEPARIN SODIUM 40000 UNITS: 10000 INJECTION, SOLUTION INTRAVENOUS; SUBCUTANEOUS at 14:20:00

## 2018-12-19 RX ADMIN — 0.9 % SODIUM CHLORIDE 17 ML: 0.9 % VIAL (ML) INJECTION at 14:21:00

## 2018-12-19 RX ADMIN — LIDOCAINE HYDROCHLORIDE 10 ML: 20 JELLY TOPICAL at 14:20:00

## 2018-12-19 NOTE — PROCEDURES
Patient here for instill. Patient reports having a lot of bladder pain. States she hasn't had pain this intense in yrs. Pt recently started ditropan last week, but had nausea and severe HA. Stopped after 6 days.  Also recently started elavil from her psych

## 2018-12-27 ENCOUNTER — TELEPHONE (OUTPATIENT)
Dept: UROLOGY | Facility: HOSPITAL | Age: 58
End: 2018-12-27

## 2019-01-02 ENCOUNTER — OFFICE VISIT (OUTPATIENT)
Dept: UROLOGY | Facility: HOSPITAL | Age: 59
End: 2019-01-02
Attending: OBSTETRICS & GYNECOLOGY
Payer: COMMERCIAL

## 2019-01-02 VITALS
DIASTOLIC BLOOD PRESSURE: 78 MMHG | HEIGHT: 67 IN | WEIGHT: 172 LBS | BODY MASS INDEX: 27 KG/M2 | SYSTOLIC BLOOD PRESSURE: 126 MMHG

## 2019-01-02 DIAGNOSIS — R39.15 URGENCY OF URINATION: ICD-10-CM

## 2019-01-02 DIAGNOSIS — R35.0 FREQUENCY OF URINATION: ICD-10-CM

## 2019-01-02 DIAGNOSIS — N30.10 IC (INTERSTITIAL CYSTITIS): Primary | ICD-10-CM

## 2019-01-02 PROCEDURE — 51700 IRRIGATION OF BLADDER: CPT

## 2019-01-02 PROCEDURE — 81002 URINALYSIS NONAUTO W/O SCOPE: CPT

## 2019-01-02 RX ORDER — ESTRADIOL 0.1 MG/G
CREAM VAGINAL
Qty: 1 TUBE | Refills: 3 | Status: SHIPPED | OUTPATIENT
Start: 2019-01-02

## 2019-01-02 RX ORDER — 0.9 % SODIUM CHLORIDE 0.9 %
17 VIAL (ML) INJECTION ONCE
Status: COMPLETED | OUTPATIENT
Start: 2019-01-02 | End: 2019-01-02

## 2019-01-02 RX ORDER — TROSPIUM CHLORIDE ER 60 MG/1
60 CAPSULE ORAL DAILY
Qty: 30 CAPSULE | Refills: 1 | Status: SHIPPED | OUTPATIENT
Start: 2019-01-02 | End: 2019-04-15

## 2019-01-02 RX ORDER — LIDOCAINE HYDROCHLORIDE 20 MG/ML
10 JELLY TOPICAL ONCE
Status: COMPLETED | OUTPATIENT
Start: 2019-01-02 | End: 2019-01-02

## 2019-01-02 RX ORDER — HEPARIN SODIUM 10000 [USP'U]/ML
40000 INJECTION, SOLUTION INTRAVENOUS; SUBCUTANEOUS ONCE
Status: COMPLETED | OUTPATIENT
Start: 2019-01-02 | End: 2019-01-02

## 2019-01-02 RX ADMIN — 0.9 % SODIUM CHLORIDE 17 ML: 0.9 % VIAL (ML) INJECTION at 11:45:00

## 2019-01-02 RX ADMIN — HEPARIN SODIUM 40000 UNITS: 10000 INJECTION, SOLUTION INTRAVENOUS; SUBCUTANEOUS at 11:45:00

## 2019-01-02 RX ADMIN — LIDOCAINE HYDROCHLORIDE 10 ML: 20 JELLY TOPICAL at 11:45:00

## 2019-01-02 NOTE — PROCEDURES
Patient here for instill. Patient reports feeling better compared to last week. Pt feels Trulicity caused her IC flare-up. Pt interested in trying trospium ER 60mg po daily, which Dr Flaquito Marshall ordered in prior conversation. Instill given per protocol.   P

## 2019-01-09 ENCOUNTER — OFFICE VISIT (OUTPATIENT)
Dept: UROLOGY | Facility: HOSPITAL | Age: 59
End: 2019-01-09
Attending: OBSTETRICS & GYNECOLOGY
Payer: COMMERCIAL

## 2019-01-09 VITALS
DIASTOLIC BLOOD PRESSURE: 74 MMHG | HEIGHT: 67 IN | WEIGHT: 172 LBS | SYSTOLIC BLOOD PRESSURE: 122 MMHG | BODY MASS INDEX: 27 KG/M2

## 2019-01-09 DIAGNOSIS — R39.15 URGENCY OF URINATION: ICD-10-CM

## 2019-01-09 DIAGNOSIS — R35.0 FREQUENCY OF URINATION: ICD-10-CM

## 2019-01-09 DIAGNOSIS — N30.10 IC (INTERSTITIAL CYSTITIS): Primary | ICD-10-CM

## 2019-01-09 PROCEDURE — 51700 IRRIGATION OF BLADDER: CPT

## 2019-01-09 PROCEDURE — 81002 URINALYSIS NONAUTO W/O SCOPE: CPT

## 2019-01-09 RX ORDER — LIDOCAINE HYDROCHLORIDE 20 MG/ML
10 JELLY TOPICAL ONCE
Status: COMPLETED | OUTPATIENT
Start: 2019-01-09 | End: 2019-01-09

## 2019-01-09 RX ORDER — HEPARIN SODIUM 10000 [USP'U]/ML
40000 INJECTION, SOLUTION INTRAVENOUS; SUBCUTANEOUS ONCE
Status: COMPLETED | OUTPATIENT
Start: 2019-01-09 | End: 2019-01-09

## 2019-01-09 RX ORDER — 0.9 % SODIUM CHLORIDE 0.9 %
17 VIAL (ML) INJECTION ONCE
Status: COMPLETED | OUTPATIENT
Start: 2019-01-09 | End: 2019-01-09

## 2019-01-09 RX ADMIN — 0.9 % SODIUM CHLORIDE 17 ML: 0.9 % VIAL (ML) INJECTION at 13:25:00

## 2019-01-09 RX ADMIN — LIDOCAINE HYDROCHLORIDE 10 ML: 20 JELLY TOPICAL at 13:25:00

## 2019-01-09 RX ADMIN — HEPARIN SODIUM 40000 UNITS: 10000 INJECTION, SOLUTION INTRAVENOUS; SUBCUTANEOUS at 13:25:00

## 2019-01-09 NOTE — PROCEDURES
Patient here for instill. Patient reports feeling urgency today. Stopped Elavil 3 days ago w/ c/o blurred vision, dry mouth. Instill given per protocol. Patient catheterized for a residual of 110 ml. Chemstrip performed.   Patient tolerated procedure we

## 2019-01-16 ENCOUNTER — OFFICE VISIT (OUTPATIENT)
Dept: UROLOGY | Facility: HOSPITAL | Age: 59
End: 2019-01-16
Attending: OBSTETRICS & GYNECOLOGY
Payer: COMMERCIAL

## 2019-01-16 VITALS — WEIGHT: 172 LBS | HEIGHT: 67 IN | BODY MASS INDEX: 27 KG/M2 | RESPIRATION RATE: 18 BRPM

## 2019-01-16 DIAGNOSIS — R35.0 FREQUENCY OF URINATION: ICD-10-CM

## 2019-01-16 DIAGNOSIS — R39.15 URGENCY OF URINATION: ICD-10-CM

## 2019-01-16 DIAGNOSIS — N30.10 IC (INTERSTITIAL CYSTITIS): Primary | ICD-10-CM

## 2019-01-16 PROCEDURE — 81002 URINALYSIS NONAUTO W/O SCOPE: CPT

## 2019-01-16 PROCEDURE — 51700 IRRIGATION OF BLADDER: CPT

## 2019-01-16 RX ORDER — LIDOCAINE HYDROCHLORIDE 20 MG/ML
10 JELLY TOPICAL ONCE
Status: COMPLETED | OUTPATIENT
Start: 2019-01-16 | End: 2019-01-16

## 2019-01-16 RX ORDER — HEPARIN SODIUM 10000 [USP'U]/ML
40000 INJECTION, SOLUTION INTRAVENOUS; SUBCUTANEOUS ONCE
Status: COMPLETED | OUTPATIENT
Start: 2019-01-16 | End: 2019-01-16

## 2019-01-16 RX ORDER — 0.9 % SODIUM CHLORIDE 0.9 %
17 VIAL (ML) INJECTION ONCE
Status: COMPLETED | OUTPATIENT
Start: 2019-01-16 | End: 2019-01-16

## 2019-01-16 RX ADMIN — 0.9 % SODIUM CHLORIDE 17 ML: 0.9 % VIAL (ML) INJECTION at 13:20:00

## 2019-01-16 RX ADMIN — HEPARIN SODIUM 40000 UNITS: 10000 INJECTION, SOLUTION INTRAVENOUS; SUBCUTANEOUS at 13:20:00

## 2019-01-16 RX ADMIN — LIDOCAINE HYDROCHLORIDE 10 ML: 20 JELLY TOPICAL at 13:20:00

## 2019-01-16 NOTE — PROCEDURES
.Patient here for instill. Patient reports feeling ok this past week. Has noticed the sensation of full bladder at times, but feels she has to sit for long periods to begin urination.    Reviewed tips to help pelvic floor relax for urination, pt verbalizes

## 2019-01-24 ENCOUNTER — OFFICE VISIT (OUTPATIENT)
Dept: UROLOGY | Facility: HOSPITAL | Age: 59
End: 2019-01-24
Attending: OBSTETRICS & GYNECOLOGY
Payer: COMMERCIAL

## 2019-01-24 DIAGNOSIS — N30.10 IC (INTERSTITIAL CYSTITIS): Primary | ICD-10-CM

## 2019-01-24 PROCEDURE — 81002 URINALYSIS NONAUTO W/O SCOPE: CPT

## 2019-01-24 PROCEDURE — 51700 IRRIGATION OF BLADDER: CPT

## 2019-01-24 RX ORDER — LIDOCAINE HYDROCHLORIDE 20 MG/ML
10 JELLY TOPICAL ONCE
Status: COMPLETED | OUTPATIENT
Start: 2019-01-24 | End: 2019-01-24

## 2019-01-24 RX ORDER — 0.9 % SODIUM CHLORIDE 0.9 %
17 VIAL (ML) INJECTION ONCE
Status: COMPLETED | OUTPATIENT
Start: 2019-01-24 | End: 2019-01-24

## 2019-01-24 RX ORDER — HEPARIN SODIUM 10000 [USP'U]/ML
40000 INJECTION, SOLUTION INTRAVENOUS; SUBCUTANEOUS ONCE
Status: COMPLETED | OUTPATIENT
Start: 2019-01-24 | End: 2019-01-24

## 2019-01-24 RX ADMIN — 0.9 % SODIUM CHLORIDE 17 ML: 0.9 % VIAL (ML) INJECTION at 13:45:00

## 2019-01-24 RX ADMIN — HEPARIN SODIUM 40000 UNITS: 10000 INJECTION, SOLUTION INTRAVENOUS; SUBCUTANEOUS at 13:45:00

## 2019-01-24 RX ADMIN — LIDOCAINE HYDROCHLORIDE 10 ML: 20 JELLY TOPICAL at 13:45:00

## 2019-01-24 NOTE — PROCEDURES
Patient here for instill. Patient reports feeling the same. Instill given per protocol. Patient catheterized for a residual of 65 ml. Chemstrip performed. Patient tolerated procedure well. Next instill scheduled for next week.  Pt leaving on trip and

## 2019-01-25 VITALS
SYSTOLIC BLOOD PRESSURE: 122 MMHG | BODY MASS INDEX: 27 KG/M2 | WEIGHT: 172 LBS | DIASTOLIC BLOOD PRESSURE: 80 MMHG | HEIGHT: 67 IN

## 2019-01-28 ENCOUNTER — OFFICE VISIT (OUTPATIENT)
Dept: UROLOGY | Facility: HOSPITAL | Age: 59
End: 2019-01-28
Attending: OBSTETRICS & GYNECOLOGY
Payer: COMMERCIAL

## 2019-01-28 VITALS
SYSTOLIC BLOOD PRESSURE: 118 MMHG | HEIGHT: 67 IN | WEIGHT: 172 LBS | DIASTOLIC BLOOD PRESSURE: 72 MMHG | BODY MASS INDEX: 27 KG/M2

## 2019-01-28 DIAGNOSIS — N30.10 IC (INTERSTITIAL CYSTITIS): Primary | ICD-10-CM

## 2019-01-28 PROCEDURE — 81002 URINALYSIS NONAUTO W/O SCOPE: CPT

## 2019-01-28 PROCEDURE — 51700 IRRIGATION OF BLADDER: CPT

## 2019-01-28 RX ORDER — HEPARIN SODIUM 10000 [USP'U]/ML
40000 INJECTION, SOLUTION INTRAVENOUS; SUBCUTANEOUS ONCE
Status: COMPLETED | OUTPATIENT
Start: 2019-01-28 | End: 2019-01-28

## 2019-01-28 RX ORDER — LIDOCAINE HYDROCHLORIDE 20 MG/ML
10 JELLY TOPICAL ONCE
Status: COMPLETED | OUTPATIENT
Start: 2019-01-28 | End: 2019-01-28

## 2019-01-28 RX ORDER — 0.9 % SODIUM CHLORIDE 0.9 %
17 VIAL (ML) INJECTION ONCE
Status: COMPLETED | OUTPATIENT
Start: 2019-01-28 | End: 2019-01-28

## 2019-01-28 RX ADMIN — HEPARIN SODIUM 40000 UNITS: 10000 INJECTION, SOLUTION INTRAVENOUS; SUBCUTANEOUS at 14:03:00

## 2019-01-28 RX ADMIN — 0.9 % SODIUM CHLORIDE 17 ML: 0.9 % VIAL (ML) INJECTION at 14:03:00

## 2019-01-28 RX ADMIN — LIDOCAINE HYDROCHLORIDE 10 ML: 20 JELLY TOPICAL at 14:03:00

## 2019-01-28 NOTE — PROCEDURES
..Patient here for instill. Patient reports feeling about the same with urgency and frequency, denies any UTI feelings. Instill given per protocol. Patient catheterized for a residual of 30 ml. Chemstrip performed. Patient tolerated procedure well.   Milana Leong

## 2019-02-20 ENCOUNTER — OFFICE VISIT (OUTPATIENT)
Dept: UROLOGY | Facility: HOSPITAL | Age: 59
End: 2019-02-20
Attending: OBSTETRICS & GYNECOLOGY
Payer: COMMERCIAL

## 2019-02-20 VITALS
SYSTOLIC BLOOD PRESSURE: 146 MMHG | HEIGHT: 67 IN | WEIGHT: 172 LBS | BODY MASS INDEX: 27 KG/M2 | DIASTOLIC BLOOD PRESSURE: 88 MMHG

## 2019-02-20 DIAGNOSIS — R39.15 URGENCY OF URINATION: ICD-10-CM

## 2019-02-20 DIAGNOSIS — R35.0 FREQUENCY OF URINATION: ICD-10-CM

## 2019-02-20 DIAGNOSIS — N30.10 IC (INTERSTITIAL CYSTITIS): Primary | ICD-10-CM

## 2019-02-20 PROCEDURE — 51700 IRRIGATION OF BLADDER: CPT

## 2019-02-20 PROCEDURE — 81002 URINALYSIS NONAUTO W/O SCOPE: CPT

## 2019-02-20 RX ORDER — HEPARIN SODIUM 10000 [USP'U]/ML
40000 INJECTION, SOLUTION INTRAVENOUS; SUBCUTANEOUS ONCE
Status: COMPLETED | OUTPATIENT
Start: 2019-02-20 | End: 2019-02-20

## 2019-02-20 RX ORDER — LIDOCAINE HYDROCHLORIDE 20 MG/ML
10 JELLY TOPICAL ONCE
Status: COMPLETED | OUTPATIENT
Start: 2019-02-20 | End: 2019-02-20

## 2019-02-20 RX ORDER — 0.9 % SODIUM CHLORIDE 0.9 %
17 VIAL (ML) INJECTION ONCE
Status: COMPLETED | OUTPATIENT
Start: 2019-02-20 | End: 2019-02-20

## 2019-02-20 RX ADMIN — LIDOCAINE HYDROCHLORIDE 10 ML: 20 JELLY TOPICAL at 14:45:00

## 2019-02-20 RX ADMIN — 0.9 % SODIUM CHLORIDE 17 ML: 0.9 % VIAL (ML) INJECTION at 14:45:00

## 2019-02-20 RX ADMIN — HEPARIN SODIUM 40000 UNITS: 10000 INJECTION, SOLUTION INTRAVENOUS; SUBCUTANEOUS at 14:45:00

## 2019-02-20 NOTE — PROCEDURES
.Patient here for instill. Patient was on vacation 2 weeks ago, has started a new diet with shakes and supplements. Reports no symptoms on vacation but has had increased bladder pain for last few days. Planning to stop herbal supplements.     Instill giv

## 2019-02-28 ENCOUNTER — OFFICE VISIT (OUTPATIENT)
Dept: UROLOGY | Facility: HOSPITAL | Age: 59
End: 2019-02-28
Attending: OBSTETRICS & GYNECOLOGY
Payer: COMMERCIAL

## 2019-02-28 VITALS — WEIGHT: 172 LBS | HEIGHT: 67 IN | BODY MASS INDEX: 27 KG/M2

## 2019-02-28 DIAGNOSIS — N30.10 IC (INTERSTITIAL CYSTITIS): Primary | ICD-10-CM

## 2019-02-28 DIAGNOSIS — R35.0 FREQUENCY OF URINATION: ICD-10-CM

## 2019-02-28 DIAGNOSIS — R39.15 URGENCY OF URINATION: ICD-10-CM

## 2019-02-28 PROCEDURE — 81002 URINALYSIS NONAUTO W/O SCOPE: CPT

## 2019-02-28 PROCEDURE — 51700 IRRIGATION OF BLADDER: CPT

## 2019-02-28 RX ORDER — 0.9 % SODIUM CHLORIDE 0.9 %
17 VIAL (ML) INJECTION ONCE
Status: COMPLETED | OUTPATIENT
Start: 2019-02-28 | End: 2019-02-28

## 2019-02-28 RX ORDER — HEPARIN SODIUM 10000 [USP'U]/ML
40000 INJECTION, SOLUTION INTRAVENOUS; SUBCUTANEOUS ONCE
Status: COMPLETED | OUTPATIENT
Start: 2019-02-28 | End: 2019-02-28

## 2019-02-28 RX ORDER — LIDOCAINE HYDROCHLORIDE 20 MG/ML
10 JELLY TOPICAL ONCE
Status: COMPLETED | OUTPATIENT
Start: 2019-02-28 | End: 2019-02-28

## 2019-02-28 RX ADMIN — 0.9 % SODIUM CHLORIDE 17 ML: 0.9 % VIAL (ML) INJECTION at 14:12:00

## 2019-02-28 RX ADMIN — HEPARIN SODIUM 40000 UNITS: 10000 INJECTION, SOLUTION INTRAVENOUS; SUBCUTANEOUS at 14:12:00

## 2019-02-28 RX ADMIN — LIDOCAINE HYDROCHLORIDE 10 ML: 20 JELLY TOPICAL at 14:12:00

## 2019-02-28 NOTE — PROCEDURES
Patient here for instill. Patient reports feeling more uncomfortable than her usual. Pt reports being on a new liquid diet and feels this is triggering her bladder sx. Instill given per protocol. Patient catheterized for a residual of 60 ml.   Chemstrip

## 2019-03-04 ENCOUNTER — TELEPHONE (OUTPATIENT)
Dept: UROLOGY | Facility: HOSPITAL | Age: 59
End: 2019-03-04

## 2019-03-04 ENCOUNTER — OFFICE VISIT (OUTPATIENT)
Dept: UROLOGY | Facility: HOSPITAL | Age: 59
End: 2019-03-04
Attending: OBSTETRICS & GYNECOLOGY
Payer: COMMERCIAL

## 2019-03-04 VITALS — TEMPERATURE: 98 F | BODY MASS INDEX: 27 KG/M2 | WEIGHT: 172 LBS | HEIGHT: 67 IN

## 2019-03-04 DIAGNOSIS — R39.15 URINARY URGENCY: ICD-10-CM

## 2019-03-04 DIAGNOSIS — R35.0 FREQUENCY OF URINATION: ICD-10-CM

## 2019-03-04 DIAGNOSIS — N30.10 IC (INTERSTITIAL CYSTITIS): Primary | ICD-10-CM

## 2019-03-04 PROCEDURE — 81002 URINALYSIS NONAUTO W/O SCOPE: CPT

## 2019-03-04 PROCEDURE — 51700 IRRIGATION OF BLADDER: CPT

## 2019-03-04 PROCEDURE — 87086 URINE CULTURE/COLONY COUNT: CPT

## 2019-03-04 RX ORDER — 0.9 % SODIUM CHLORIDE 0.9 %
17 VIAL (ML) INJECTION ONCE
Status: COMPLETED | OUTPATIENT
Start: 2019-03-04 | End: 2019-03-04

## 2019-03-04 RX ORDER — LIDOCAINE HYDROCHLORIDE 20 MG/ML
10 JELLY TOPICAL ONCE
Status: COMPLETED | OUTPATIENT
Start: 2019-03-04 | End: 2019-03-04

## 2019-03-04 RX ORDER — HEPARIN SODIUM 10000 [USP'U]/ML
40000 INJECTION, SOLUTION INTRAVENOUS; SUBCUTANEOUS ONCE
Status: COMPLETED | OUTPATIENT
Start: 2019-03-04 | End: 2019-03-04

## 2019-03-04 RX ADMIN — LIDOCAINE HYDROCHLORIDE 10 ML: 20 JELLY TOPICAL at 15:30:00

## 2019-03-04 RX ADMIN — 0.9 % SODIUM CHLORIDE 17 ML: 0.9 % VIAL (ML) INJECTION at 15:30:00

## 2019-03-04 RX ADMIN — HEPARIN SODIUM 40000 UNITS: 10000 INJECTION, SOLUTION INTRAVENOUS; SUBCUTANEOUS at 15:30:00

## 2019-03-04 NOTE — PROCEDURES
.Patient here for rescue instill. Patient reports feeling bladder pain all weekend, with urgency/frequency. Instill given per protocol. Patient catheterized for a residual of 20 ml. Chemstrip performed. Patient tolerated procedure well.   Next instill

## 2019-03-04 NOTE — TELEPHONE ENCOUNTER
Pt called to report she is having really bad bladder pain all weekend, using heating pads and meds and nothing is helping. Requesting instill today, its been 4 days since previous one. Spoke to Dr. Laci Keenan, ok to give instill today.   Called pt and notified, pt

## 2019-03-05 ENCOUNTER — TELEPHONE (OUTPATIENT)
Dept: UROLOGY | Facility: HOSPITAL | Age: 59
End: 2019-03-05

## 2019-03-05 RX ORDER — NITROFURANTOIN 25; 75 MG/1; MG/1
100 CAPSULE ORAL 2 TIMES DAILY
Qty: 14 CAPSULE | Refills: 0 | Status: SHIPPED | OUTPATIENT
Start: 2019-03-05 | End: 2019-04-15 | Stop reason: ALTCHOICE

## 2019-03-05 NOTE — TELEPHONE ENCOUNTER
Pt requested to start antibiotics while pending urine culture as she is concerned she is about to travel out of town. Spoke to Dr. Christopher Olivarez with condition update, VORB Macrobid PO BID x 7 days.

## 2019-03-06 ENCOUNTER — TELEPHONE (OUTPATIENT)
Dept: UROLOGY | Facility: HOSPITAL | Age: 59
End: 2019-03-06

## 2019-03-07 NOTE — PROCEDURES
Late entry:  Patient here for instill. Patient reports feeling the same bladder sx w/ pain, urgency. Instill given per protocol. Patient catheterized for a residual of WNL amt. Chemstrip performed. Patient tolerated procedure well.   Next instill sched

## 2019-03-18 ENCOUNTER — APPOINTMENT (OUTPATIENT)
Dept: GENERAL RADIOLOGY | Age: 59
End: 2019-03-18
Attending: FAMILY MEDICINE
Payer: COMMERCIAL

## 2019-03-18 ENCOUNTER — HOSPITAL ENCOUNTER (OUTPATIENT)
Age: 59
Discharge: HOME | End: 2019-03-18
Attending: FAMILY MEDICINE
Payer: COMMERCIAL

## 2019-03-18 VITALS
BODY MASS INDEX: 29.35 KG/M2 | WEIGHT: 187 LBS | HEIGHT: 67 IN | SYSTOLIC BLOOD PRESSURE: 148 MMHG | DIASTOLIC BLOOD PRESSURE: 85 MMHG | RESPIRATION RATE: 18 BRPM | OXYGEN SATURATION: 97 % | HEART RATE: 105 BPM | TEMPERATURE: 98 F

## 2019-03-18 DIAGNOSIS — R03.0 ELEVATED BLOOD PRESSURE READING WITHOUT DIAGNOSIS OF HYPERTENSION: ICD-10-CM

## 2019-03-18 DIAGNOSIS — J40 BRONCHITIS: Primary | ICD-10-CM

## 2019-03-18 DIAGNOSIS — R00.0 TACHYCARDIA: ICD-10-CM

## 2019-03-18 PROCEDURE — 93010 ELECTROCARDIOGRAM REPORT: CPT | Performed by: FAMILY MEDICINE

## 2019-03-18 PROCEDURE — 71046 X-RAY EXAM CHEST 2 VIEWS: CPT | Performed by: FAMILY MEDICINE

## 2019-03-18 PROCEDURE — 87502 INFLUENZA DNA AMP PROBE: CPT | Performed by: FAMILY MEDICINE

## 2019-03-18 PROCEDURE — 93010 ELECTROCARDIOGRAM REPORT: CPT

## 2019-03-18 PROCEDURE — 99214 OFFICE O/P EST MOD 30 MIN: CPT

## 2019-03-18 PROCEDURE — 87430 STREP A AG IA: CPT

## 2019-03-18 PROCEDURE — 93005 ELECTROCARDIOGRAM TRACING: CPT

## 2019-03-18 RX ORDER — AZITHROMYCIN 250 MG/1
TABLET, FILM COATED ORAL
Qty: 1 PACKAGE | Refills: 0 | Status: SHIPPED | OUTPATIENT
Start: 2019-03-18 | End: 2019-05-07

## 2019-03-18 NOTE — ED INITIAL ASSESSMENT (HPI)
Patient states her  was recently diagnosed with bronchitis. Patient c/o sore throat, sinus pain/pressure, cough, chest pain with cough. Patient states having fever/chills. Patient denies having her influenza vaccination this season.

## 2019-03-18 NOTE — ED PROVIDER NOTES
Patient Seen in: [address]    History   Patient presents with:  Cough/URI    Stated Complaint: sore throat    HPI  59yo F presents to IC with about 5-6 days of sore throat, nasal congestion, wheezing, body aches, fevers THYROIDECTOMY  2007    Due to thyroid cancer   • UPPER GI ENDOSCOPY - REFERRAL  10/2011    Normal       Family history reviewed and is not pertinent to presenting problem.     Social History    Tobacco Use      Smoking status: Never Smoker      Smokeless to Abdominal: Soft. She exhibits no distension. There is no tenderness. Musculoskeletal: She exhibits no edema (no LE edema, no calf tenderness b/l). Lymphadenopathy:     She has no cervical adenopathy.    Neurological: She is alert and oriented to [facility] doctor tomorrow for recheck. Will start on azithromycin for  Bronchitis. Patient understands she is going against medical advice by not going to the ER now for further evaluation.              Disposition and Plan     Clinical Impression:  Bronchitis  (felicita

## 2019-03-21 ENCOUNTER — OFFICE VISIT (OUTPATIENT)
Dept: UROLOGY | Facility: HOSPITAL | Age: 59
End: 2019-03-21
Attending: OBSTETRICS & GYNECOLOGY
Payer: COMMERCIAL

## 2019-03-21 VITALS
SYSTOLIC BLOOD PRESSURE: 162 MMHG | HEIGHT: 67 IN | BODY MASS INDEX: 29.35 KG/M2 | DIASTOLIC BLOOD PRESSURE: 90 MMHG | WEIGHT: 187 LBS

## 2019-03-21 DIAGNOSIS — N30.10 IC (INTERSTITIAL CYSTITIS): Primary | ICD-10-CM

## 2019-03-21 DIAGNOSIS — R35.0 FREQUENCY OF URINATION: ICD-10-CM

## 2019-03-21 DIAGNOSIS — R39.15 URINARY URGENCY: ICD-10-CM

## 2019-03-21 PROCEDURE — 81002 URINALYSIS NONAUTO W/O SCOPE: CPT

## 2019-03-21 PROCEDURE — 51700 IRRIGATION OF BLADDER: CPT

## 2019-03-21 RX ORDER — HEPARIN SODIUM 10000 [USP'U]/ML
40000 INJECTION, SOLUTION INTRAVENOUS; SUBCUTANEOUS ONCE
Status: COMPLETED | OUTPATIENT
Start: 2019-03-21 | End: 2019-03-21

## 2019-03-21 RX ORDER — 0.9 % SODIUM CHLORIDE 0.9 %
17 VIAL (ML) INJECTION ONCE
Status: COMPLETED | OUTPATIENT
Start: 2019-03-21 | End: 2019-03-21

## 2019-03-21 RX ORDER — LIDOCAINE HYDROCHLORIDE 20 MG/ML
10 JELLY TOPICAL ONCE
Status: COMPLETED | OUTPATIENT
Start: 2019-03-21 | End: 2019-03-21

## 2019-03-21 RX ADMIN — 0.9 % SODIUM CHLORIDE 17 ML: 0.9 % VIAL (ML) INJECTION at 14:10:00

## 2019-03-21 RX ADMIN — HEPARIN SODIUM 40000 UNITS: 10000 INJECTION, SOLUTION INTRAVENOUS; SUBCUTANEOUS at 14:09:00

## 2019-03-21 RX ADMIN — LIDOCAINE HYDROCHLORIDE 10 ML: 20 JELLY TOPICAL at 14:09:00

## 2019-03-21 NOTE — PROCEDURES
Patient here for instill. Patient reports having more frequency/urgency and pain. Pt started trospium recently. C/o dry mouth and headaches. Not sure she wants to continue. Will monitor sx for now.  Pt reports trying oxybutynin for 2-3 wks at one point, b

## 2019-03-27 ENCOUNTER — OFFICE VISIT (OUTPATIENT)
Dept: UROLOGY | Facility: HOSPITAL | Age: 59
End: 2019-03-27
Attending: OBSTETRICS & GYNECOLOGY
Payer: COMMERCIAL

## 2019-03-27 VITALS — HEIGHT: 67 IN | BODY MASS INDEX: 29.35 KG/M2 | WEIGHT: 187 LBS

## 2019-03-27 DIAGNOSIS — R39.15 URINARY URGENCY: ICD-10-CM

## 2019-03-27 DIAGNOSIS — N30.10 IC (INTERSTITIAL CYSTITIS): Primary | ICD-10-CM

## 2019-03-27 DIAGNOSIS — R35.0 FREQUENCY OF URINATION: ICD-10-CM

## 2019-03-27 PROCEDURE — 51700 IRRIGATION OF BLADDER: CPT

## 2019-03-27 PROCEDURE — 81002 URINALYSIS NONAUTO W/O SCOPE: CPT

## 2019-03-27 RX ORDER — LIDOCAINE HYDROCHLORIDE 20 MG/ML
10 JELLY TOPICAL ONCE
Status: COMPLETED | OUTPATIENT
Start: 2019-03-27 | End: 2019-03-27

## 2019-03-27 RX ORDER — HEPARIN SODIUM 10000 [USP'U]/ML
40000 INJECTION, SOLUTION INTRAVENOUS; SUBCUTANEOUS ONCE
Status: COMPLETED | OUTPATIENT
Start: 2019-03-27 | End: 2019-03-27

## 2019-03-27 RX ORDER — 0.9 % SODIUM CHLORIDE 0.9 %
17 VIAL (ML) INJECTION ONCE
Status: COMPLETED | OUTPATIENT
Start: 2019-03-27 | End: 2019-03-27

## 2019-03-27 RX ADMIN — 0.9 % SODIUM CHLORIDE 17 ML: 0.9 % VIAL (ML) INJECTION at 14:16:00

## 2019-03-27 RX ADMIN — HEPARIN SODIUM 40000 UNITS: 10000 INJECTION, SOLUTION INTRAVENOUS; SUBCUTANEOUS at 14:16:00

## 2019-03-27 RX ADMIN — LIDOCAINE HYDROCHLORIDE 10 ML: 20 JELLY TOPICAL at 14:16:00

## 2019-03-27 NOTE — PROCEDURES
Patient here for instill. Patient reports feeling frequency, urgency. Instill given per protocol. Patient catheterized for a residual of 70 ml. Chemstrip performed. Patient tolerated procedure well. Next instill scheduled for 2 wks.

## 2019-04-15 ENCOUNTER — OFFICE VISIT (OUTPATIENT)
Dept: UROLOGY | Facility: HOSPITAL | Age: 59
End: 2019-04-15
Attending: OBSTETRICS & GYNECOLOGY
Payer: COMMERCIAL

## 2019-04-15 VITALS
DIASTOLIC BLOOD PRESSURE: 80 MMHG | BODY MASS INDEX: 29.35 KG/M2 | WEIGHT: 187 LBS | HEIGHT: 67 IN | SYSTOLIC BLOOD PRESSURE: 130 MMHG

## 2019-04-15 DIAGNOSIS — R39.15 URINARY URGENCY: ICD-10-CM

## 2019-04-15 DIAGNOSIS — N30.10 IC (INTERSTITIAL CYSTITIS): Primary | ICD-10-CM

## 2019-04-15 DIAGNOSIS — R35.0 FREQUENCY OF URINATION: ICD-10-CM

## 2019-04-15 PROCEDURE — 51700 IRRIGATION OF BLADDER: CPT

## 2019-04-15 PROCEDURE — 81002 URINALYSIS NONAUTO W/O SCOPE: CPT

## 2019-04-15 RX ORDER — LIDOCAINE HYDROCHLORIDE 20 MG/ML
10 JELLY TOPICAL ONCE
Status: COMPLETED | OUTPATIENT
Start: 2019-04-15 | End: 2019-04-15

## 2019-04-15 RX ORDER — HEPARIN SODIUM 10000 [USP'U]/ML
40000 INJECTION, SOLUTION INTRAVENOUS; SUBCUTANEOUS ONCE
Status: COMPLETED | OUTPATIENT
Start: 2019-04-15 | End: 2019-04-15

## 2019-04-15 RX ORDER — 0.9 % SODIUM CHLORIDE 0.9 %
17 VIAL (ML) INJECTION ONCE
Status: COMPLETED | OUTPATIENT
Start: 2019-04-15 | End: 2019-04-15

## 2019-04-15 RX ADMIN — LIDOCAINE HYDROCHLORIDE 10 ML: 20 JELLY TOPICAL at 14:18:00

## 2019-04-15 RX ADMIN — 0.9 % SODIUM CHLORIDE 17 ML: 0.9 % VIAL (ML) INJECTION at 14:18:00

## 2019-04-15 RX ADMIN — HEPARIN SODIUM 40000 UNITS: 10000 INJECTION, SOLUTION INTRAVENOUS; SUBCUTANEOUS at 14:18:00

## 2019-04-15 NOTE — PROCEDURES
Patient here for instill. Patient reports feeling frequency/urgency. Instill given per protocol. Patient catheterized for a residual of 20 ml. Chemstrip performed. Patient tolerated procedure well. Next instill scheduled for next week.

## 2019-04-29 ENCOUNTER — OFFICE VISIT (OUTPATIENT)
Dept: UROLOGY | Facility: HOSPITAL | Age: 59
End: 2019-04-29
Attending: OBSTETRICS & GYNECOLOGY
Payer: COMMERCIAL

## 2019-04-29 VITALS
BODY MASS INDEX: 29.35 KG/M2 | WEIGHT: 187 LBS | DIASTOLIC BLOOD PRESSURE: 88 MMHG | HEIGHT: 67 IN | SYSTOLIC BLOOD PRESSURE: 132 MMHG

## 2019-04-29 DIAGNOSIS — R39.15 URINARY URGENCY: ICD-10-CM

## 2019-04-29 DIAGNOSIS — N30.10 IC (INTERSTITIAL CYSTITIS): Primary | ICD-10-CM

## 2019-04-29 DIAGNOSIS — R35.0 FREQUENCY OF URINATION: ICD-10-CM

## 2019-04-29 PROCEDURE — 81002 URINALYSIS NONAUTO W/O SCOPE: CPT

## 2019-04-29 PROCEDURE — 51700 IRRIGATION OF BLADDER: CPT

## 2019-04-29 RX ORDER — LIDOCAINE HYDROCHLORIDE 20 MG/ML
10 JELLY TOPICAL ONCE
Status: COMPLETED | OUTPATIENT
Start: 2019-04-29 | End: 2019-04-29

## 2019-04-29 RX ORDER — OXYBUTYNIN CHLORIDE 5 MG/1
5 TABLET ORAL 3 TIMES DAILY
COMMUNITY
End: 2019-05-07

## 2019-04-29 RX ORDER — 0.9 % SODIUM CHLORIDE 0.9 %
17 VIAL (ML) INJECTION ONCE
Status: COMPLETED | OUTPATIENT
Start: 2019-04-29 | End: 2019-04-29

## 2019-04-29 RX ORDER — HEPARIN SODIUM 10000 [USP'U]/ML
40000 INJECTION, SOLUTION INTRAVENOUS; SUBCUTANEOUS ONCE
Status: COMPLETED | OUTPATIENT
Start: 2019-04-29 | End: 2019-04-29

## 2019-04-29 RX ADMIN — 0.9 % SODIUM CHLORIDE 17 ML: 0.9 % VIAL (ML) INJECTION at 14:46:00

## 2019-04-29 RX ADMIN — HEPARIN SODIUM 40000 UNITS: 10000 INJECTION, SOLUTION INTRAVENOUS; SUBCUTANEOUS at 14:46:00

## 2019-04-29 RX ADMIN — LIDOCAINE HYDROCHLORIDE 10 ML: 20 JELLY TOPICAL at 14:46:00

## 2019-04-29 NOTE — PROCEDURES
Patient here for instill. Patient reports feeling urgency/frequency. Pt recently was denied PTNS approval, so has decided to try ditropan 5mg po. Has medication still at home. Instill given per protocol. Patient catheterized for a residual of 40 ml.   C

## 2019-05-08 ENCOUNTER — HOSPITAL ENCOUNTER (OUTPATIENT)
Dept: MAMMOGRAPHY | Facility: HOSPITAL | Age: 59
Discharge: HOME | End: 2019-05-08
Attending: OBSTETRICS & GYNECOLOGY
Payer: COMMERCIAL

## 2019-05-08 DIAGNOSIS — N60.09 CYST OF BREAST, UNSPECIFIED LATERALITY: ICD-10-CM

## 2019-05-08 DIAGNOSIS — N63.0 BREAST NODULE: ICD-10-CM

## 2019-05-08 PROCEDURE — 76641 ULTRASOUND BREAST COMPLETE: CPT | Performed by: OBSTETRICS & GYNECOLOGY

## 2019-05-08 PROCEDURE — 77062 BREAST TOMOSYNTHESIS BI: CPT | Performed by: OBSTETRICS & GYNECOLOGY

## 2019-05-08 PROCEDURE — 77066 DX MAMMO INCL CAD BI: CPT | Performed by: OBSTETRICS & GYNECOLOGY

## 2019-05-09 ENCOUNTER — OFFICE VISIT (OUTPATIENT)
Dept: UROLOGY | Facility: HOSPITAL | Age: 59
End: 2019-05-09
Attending: OBSTETRICS & GYNECOLOGY
Payer: COMMERCIAL

## 2019-05-09 VITALS
BODY MASS INDEX: 29.35 KG/M2 | DIASTOLIC BLOOD PRESSURE: 82 MMHG | SYSTOLIC BLOOD PRESSURE: 128 MMHG | HEIGHT: 67 IN | WEIGHT: 187 LBS

## 2019-05-09 DIAGNOSIS — R39.15 URGENCY OF URINATION: ICD-10-CM

## 2019-05-09 DIAGNOSIS — R35.0 FREQUENCY OF MICTURITION: ICD-10-CM

## 2019-05-09 DIAGNOSIS — N30.10 IC (INTERSTITIAL CYSTITIS): Primary | ICD-10-CM

## 2019-05-09 PROCEDURE — 51700 IRRIGATION OF BLADDER: CPT

## 2019-05-09 PROCEDURE — 81002 URINALYSIS NONAUTO W/O SCOPE: CPT

## 2019-05-09 RX ORDER — HEPARIN SODIUM 10000 [USP'U]/ML
40000 INJECTION, SOLUTION INTRAVENOUS; SUBCUTANEOUS ONCE
Status: COMPLETED | OUTPATIENT
Start: 2019-05-09 | End: 2019-05-09

## 2019-05-09 RX ORDER — LIDOCAINE HYDROCHLORIDE 20 MG/ML
10 JELLY TOPICAL ONCE
Status: COMPLETED | OUTPATIENT
Start: 2019-05-09 | End: 2019-05-09

## 2019-05-09 RX ORDER — OXYBUTYNIN CHLORIDE 5 MG/1
5 TABLET, EXTENDED RELEASE ORAL DAILY
COMMUNITY
End: 2019-07-01

## 2019-05-09 RX ORDER — 0.9 % SODIUM CHLORIDE 0.9 %
17 VIAL (ML) INJECTION ONCE
Status: COMPLETED | OUTPATIENT
Start: 2019-05-09 | End: 2019-05-09

## 2019-05-09 RX ADMIN — LIDOCAINE HYDROCHLORIDE 10 ML: 20 JELLY TOPICAL at 12:10:00

## 2019-05-09 RX ADMIN — 0.9 % SODIUM CHLORIDE 17 ML: 0.9 % VIAL (ML) INJECTION at 12:10:00

## 2019-05-09 RX ADMIN — HEPARIN SODIUM 40000 UNITS: 10000 INJECTION, SOLUTION INTRAVENOUS; SUBCUTANEOUS at 12:10:00

## 2019-05-09 NOTE — PROCEDURES
Patient here for instill. Patient reports feeling frequency, urgency and bladder pain. Still taking Oxybutynin. Having dry mouth. Advised pt to try biotene mouthwash. Instill given per protocol. Patient catheterized for a residual of 20 ml.   Chemstrip p

## 2019-05-14 PROCEDURE — 86256 FLUORESCENT ANTIBODY TITER: CPT | Performed by: FAMILY MEDICINE

## 2019-05-14 PROCEDURE — 82784 ASSAY IGA/IGD/IGG/IGM EACH: CPT | Performed by: FAMILY MEDICINE

## 2019-05-14 PROCEDURE — 86803 HEPATITIS C AB TEST: CPT | Performed by: FAMILY MEDICINE

## 2019-05-30 ENCOUNTER — OFFICE VISIT (OUTPATIENT)
Dept: UROLOGY | Facility: HOSPITAL | Age: 59
End: 2019-05-30
Attending: OBSTETRICS & GYNECOLOGY
Payer: COMMERCIAL

## 2019-05-30 VITALS
BODY MASS INDEX: 30.76 KG/M2 | SYSTOLIC BLOOD PRESSURE: 140 MMHG | WEIGHT: 196 LBS | DIASTOLIC BLOOD PRESSURE: 78 MMHG | HEIGHT: 67 IN

## 2019-05-30 DIAGNOSIS — R35.0 FREQUENCY OF URINATION: ICD-10-CM

## 2019-05-30 DIAGNOSIS — N30.10 IC (INTERSTITIAL CYSTITIS): Primary | ICD-10-CM

## 2019-05-30 DIAGNOSIS — R39.15 URGENCY OF URINATION: ICD-10-CM

## 2019-05-30 PROCEDURE — 81002 URINALYSIS NONAUTO W/O SCOPE: CPT

## 2019-05-30 PROCEDURE — 51700 IRRIGATION OF BLADDER: CPT

## 2019-05-30 RX ORDER — HEPARIN SODIUM 10000 [USP'U]/ML
40000 INJECTION, SOLUTION INTRAVENOUS; SUBCUTANEOUS ONCE
Status: COMPLETED | OUTPATIENT
Start: 2019-05-30 | End: 2019-05-30

## 2019-05-30 RX ORDER — LIDOCAINE HYDROCHLORIDE 20 MG/ML
10 JELLY TOPICAL ONCE
Status: COMPLETED | OUTPATIENT
Start: 2019-05-30 | End: 2019-05-30

## 2019-05-30 RX ORDER — 0.9 % SODIUM CHLORIDE 0.9 %
17 VIAL (ML) INJECTION ONCE
Status: COMPLETED | OUTPATIENT
Start: 2019-05-30 | End: 2019-05-30

## 2019-05-30 RX ADMIN — 0.9 % SODIUM CHLORIDE 17 ML: 0.9 % VIAL (ML) INJECTION at 15:10:00

## 2019-05-30 RX ADMIN — LIDOCAINE HYDROCHLORIDE 10 ML: 20 JELLY TOPICAL at 15:10:00

## 2019-05-30 RX ADMIN — HEPARIN SODIUM 40000 UNITS: 10000 INJECTION, SOLUTION INTRAVENOUS; SUBCUTANEOUS at 15:10:00

## 2019-05-30 NOTE — PROCEDURES
.Patient here for instill. Patient reports feeling some improvements with urgency, frequency and bladder pain. Has started a medical marijuana plan with pain doctor including some CBD combination suppositories inserted vaginally.   Instill given per prot

## 2019-06-19 ENCOUNTER — OFFICE VISIT (OUTPATIENT)
Dept: UROLOGY | Facility: HOSPITAL | Age: 59
End: 2019-06-19
Attending: OBSTETRICS & GYNECOLOGY
Payer: COMMERCIAL

## 2019-06-19 VITALS — HEIGHT: 67 IN | BODY MASS INDEX: 30.76 KG/M2 | WEIGHT: 196 LBS

## 2019-06-19 DIAGNOSIS — N30.10 IC (INTERSTITIAL CYSTITIS): Primary | ICD-10-CM

## 2019-06-19 DIAGNOSIS — R35.0 FREQUENCY OF URINATION: ICD-10-CM

## 2019-06-19 DIAGNOSIS — R39.15 URGENCY OF URINATION: ICD-10-CM

## 2019-06-19 PROCEDURE — 51700 IRRIGATION OF BLADDER: CPT

## 2019-06-19 PROCEDURE — 81002 URINALYSIS NONAUTO W/O SCOPE: CPT

## 2019-06-19 RX ORDER — LIDOCAINE HYDROCHLORIDE 20 MG/ML
10 JELLY TOPICAL ONCE
Status: COMPLETED | OUTPATIENT
Start: 2019-06-19 | End: 2019-06-19

## 2019-06-19 RX ORDER — HEPARIN SODIUM 10000 [USP'U]/ML
40000 INJECTION, SOLUTION INTRAVENOUS; SUBCUTANEOUS ONCE
Status: COMPLETED | OUTPATIENT
Start: 2019-06-19 | End: 2019-06-19

## 2019-06-19 RX ORDER — 0.9 % SODIUM CHLORIDE 0.9 %
17 VIAL (ML) INJECTION ONCE
Status: COMPLETED | OUTPATIENT
Start: 2019-06-19 | End: 2019-06-19

## 2019-06-19 RX ADMIN — HEPARIN SODIUM 40000 UNITS: 10000 INJECTION, SOLUTION INTRAVENOUS; SUBCUTANEOUS at 14:59:00

## 2019-06-19 RX ADMIN — 0.9 % SODIUM CHLORIDE 17 ML: 0.9 % VIAL (ML) INJECTION at 14:59:00

## 2019-06-19 RX ADMIN — LIDOCAINE HYDROCHLORIDE 10 ML: 20 JELLY TOPICAL at 14:59:00

## 2019-06-19 NOTE — PROCEDURES
Patient here for instill . Patient reports feeling less bladder pain feels that marijuana supp are helping . Instill given per protocol. Patient catheterized for a residual of 100ml. Chemstrip performed. Patient tolerated procedure well.   Next instill

## 2019-07-01 ENCOUNTER — OFFICE VISIT (OUTPATIENT)
Dept: UROLOGY | Facility: HOSPITAL | Age: 59
End: 2019-07-01
Attending: OBSTETRICS & GYNECOLOGY
Payer: COMMERCIAL

## 2019-07-01 VITALS — HEIGHT: 67 IN | WEIGHT: 196 LBS | BODY MASS INDEX: 30.76 KG/M2 | RESPIRATION RATE: 16 BRPM

## 2019-07-01 DIAGNOSIS — R35.0 FREQUENCY OF URINATION: ICD-10-CM

## 2019-07-01 DIAGNOSIS — R39.15 URGENCY OF URINATION: ICD-10-CM

## 2019-07-01 DIAGNOSIS — N30.10 IC (INTERSTITIAL CYSTITIS): Primary | ICD-10-CM

## 2019-07-01 PROCEDURE — 51700 IRRIGATION OF BLADDER: CPT

## 2019-07-01 PROCEDURE — 81002 URINALYSIS NONAUTO W/O SCOPE: CPT

## 2019-07-01 RX ORDER — HEPARIN SODIUM 10000 [USP'U]/ML
40000 INJECTION, SOLUTION INTRAVENOUS; SUBCUTANEOUS ONCE
Status: COMPLETED | OUTPATIENT
Start: 2019-07-01 | End: 2019-07-01

## 2019-07-01 RX ORDER — LIDOCAINE HYDROCHLORIDE 20 MG/ML
10 JELLY TOPICAL ONCE
Status: COMPLETED | OUTPATIENT
Start: 2019-07-01 | End: 2019-07-01

## 2019-07-01 RX ADMIN — HEPARIN SODIUM 40000 UNITS: 10000 INJECTION, SOLUTION INTRAVENOUS; SUBCUTANEOUS at 15:15:00

## 2019-07-01 RX ADMIN — LIDOCAINE HYDROCHLORIDE 10 ML: 20 JELLY TOPICAL at 15:15:00

## 2019-07-22 ENCOUNTER — TELEPHONE (OUTPATIENT)
Dept: UROLOGY | Facility: HOSPITAL | Age: 59
End: 2019-07-22

## 2019-07-22 NOTE — TELEPHONE ENCOUNTER
Patient [place] states has been in bed for the last 2 days with IC pain. Inquiring if Dr Татьяна Piper could prescribe some pain medicine.   Explained to patient Dr Татьяна Piper can not prescribe narcotics over the phone, patient verbal

## 2019-07-26 ENCOUNTER — OFFICE VISIT (OUTPATIENT)
Dept: UROLOGY | Facility: HOSPITAL | Age: 59
End: 2019-07-26
Attending: OBSTETRICS & GYNECOLOGY
Payer: COMMERCIAL

## 2019-07-26 VITALS
HEIGHT: 67 IN | WEIGHT: 197 LBS | DIASTOLIC BLOOD PRESSURE: 78 MMHG | BODY MASS INDEX: 30.92 KG/M2 | SYSTOLIC BLOOD PRESSURE: 122 MMHG

## 2019-07-26 DIAGNOSIS — N30.10 IC (INTERSTITIAL CYSTITIS): Primary | ICD-10-CM

## 2019-07-26 DIAGNOSIS — R39.15 URGENCY OF URINATION: ICD-10-CM

## 2019-07-26 DIAGNOSIS — R35.0 FREQUENCY OF URINATION: ICD-10-CM

## 2019-07-26 PROCEDURE — 81002 URINALYSIS NONAUTO W/O SCOPE: CPT

## 2019-07-26 PROCEDURE — 51700 IRRIGATION OF BLADDER: CPT

## 2019-07-26 RX ORDER — LIDOCAINE HYDROCHLORIDE 20 MG/ML
10 JELLY TOPICAL ONCE
Status: COMPLETED | OUTPATIENT
Start: 2019-07-26 | End: 2019-07-26

## 2019-07-26 RX ORDER — 0.9 % SODIUM CHLORIDE 0.9 %
17 VIAL (ML) INJECTION ONCE
Status: COMPLETED | OUTPATIENT
Start: 2019-07-26 | End: 2019-07-26

## 2019-07-26 RX ORDER — HEPARIN SODIUM 10000 [USP'U]/ML
40000 INJECTION, SOLUTION INTRAVENOUS; SUBCUTANEOUS ONCE
Status: COMPLETED | OUTPATIENT
Start: 2019-07-26 | End: 2019-07-26

## 2019-07-26 RX ADMIN — HEPARIN SODIUM 40000 UNITS: 10000 INJECTION, SOLUTION INTRAVENOUS; SUBCUTANEOUS at 12:32:00

## 2019-07-26 RX ADMIN — 0.9 % SODIUM CHLORIDE 17 ML: 0.9 % VIAL (ML) INJECTION at 12:32:00

## 2019-07-26 RX ADMIN — LIDOCAINE HYDROCHLORIDE 10 ML: 20 JELLY TOPICAL at 12:32:00

## 2019-07-26 NOTE — PROCEDURES
Patient here for instill. Patient reports feeling urgency frequency and bladder pain. Instill given per protocol. Patient catheterized for a residual of 10 ml. Chemstrip performed. Patient tolerated procedure well. Next instill scheduled for PRN.

## 2019-09-05 ENCOUNTER — OFFICE VISIT (OUTPATIENT)
Dept: UROLOGY | Facility: HOSPITAL | Age: 59
End: 2019-09-05
Attending: OBSTETRICS & GYNECOLOGY
Payer: COMMERCIAL

## 2019-09-05 VITALS
HEIGHT: 67 IN | WEIGHT: 194 LBS | BODY MASS INDEX: 30.45 KG/M2 | SYSTOLIC BLOOD PRESSURE: 126 MMHG | DIASTOLIC BLOOD PRESSURE: 78 MMHG

## 2019-09-05 DIAGNOSIS — N30.10 IC (INTERSTITIAL CYSTITIS): Primary | ICD-10-CM

## 2019-09-05 DIAGNOSIS — R35.0 FREQUENCY OF URINATION: ICD-10-CM

## 2019-09-05 DIAGNOSIS — R39.15 URGENCY OF URINATION: ICD-10-CM

## 2019-09-05 PROCEDURE — 81002 URINALYSIS NONAUTO W/O SCOPE: CPT

## 2019-09-05 PROCEDURE — 51700 IRRIGATION OF BLADDER: CPT

## 2019-09-05 RX ORDER — HEPARIN SODIUM 10000 [USP'U]/ML
40000 INJECTION, SOLUTION INTRAVENOUS; SUBCUTANEOUS ONCE
Status: COMPLETED | OUTPATIENT
Start: 2019-09-05 | End: 2019-09-05

## 2019-09-05 RX ORDER — LIDOCAINE HYDROCHLORIDE 20 MG/ML
10 JELLY TOPICAL ONCE
Status: COMPLETED | OUTPATIENT
Start: 2019-09-05 | End: 2019-09-05

## 2019-09-05 RX ORDER — 0.9 % SODIUM CHLORIDE 0.9 %
17 VIAL (ML) INJECTION ONCE
Status: COMPLETED | OUTPATIENT
Start: 2019-09-05 | End: 2019-09-05

## 2019-09-05 RX ADMIN — HEPARIN SODIUM 40000 UNITS: 10000 INJECTION, SOLUTION INTRAVENOUS; SUBCUTANEOUS at 13:30:00

## 2019-09-05 RX ADMIN — LIDOCAINE HYDROCHLORIDE 10 ML: 20 JELLY TOPICAL at 13:30:00

## 2019-09-05 RX ADMIN — 0.9 % SODIUM CHLORIDE 17 ML: 0.9 % VIAL (ML) INJECTION at 13:30:00

## 2019-09-26 ENCOUNTER — OFFICE VISIT (OUTPATIENT)
Dept: UROLOGY | Facility: HOSPITAL | Age: 59
End: 2019-09-26
Attending: OBSTETRICS & GYNECOLOGY
Payer: COMMERCIAL

## 2019-09-26 VITALS
SYSTOLIC BLOOD PRESSURE: 122 MMHG | WEIGHT: 194 LBS | BODY MASS INDEX: 30.45 KG/M2 | DIASTOLIC BLOOD PRESSURE: 82 MMHG | HEIGHT: 67 IN

## 2019-09-26 DIAGNOSIS — R35.0 FREQUENCY OF URINATION: ICD-10-CM

## 2019-09-26 DIAGNOSIS — N30.10 IC (INTERSTITIAL CYSTITIS): Primary | ICD-10-CM

## 2019-09-26 DIAGNOSIS — R39.15 URGENCY OF URINATION: ICD-10-CM

## 2019-09-26 PROCEDURE — 51700 IRRIGATION OF BLADDER: CPT

## 2019-09-26 PROCEDURE — 81002 URINALYSIS NONAUTO W/O SCOPE: CPT

## 2019-09-26 RX ORDER — HEPARIN SODIUM 10000 [USP'U]/ML
40000 INJECTION, SOLUTION INTRAVENOUS; SUBCUTANEOUS ONCE
Status: COMPLETED | OUTPATIENT
Start: 2019-09-26 | End: 2019-09-26

## 2019-09-26 RX ORDER — LIDOCAINE HYDROCHLORIDE 20 MG/ML
10 JELLY TOPICAL ONCE
Status: COMPLETED | OUTPATIENT
Start: 2019-09-26 | End: 2019-09-26

## 2019-09-26 RX ORDER — 0.9 % SODIUM CHLORIDE 0.9 %
17 VIAL (ML) INJECTION ONCE
Status: COMPLETED | OUTPATIENT
Start: 2019-09-26 | End: 2019-09-26

## 2019-09-26 RX ADMIN — LIDOCAINE HYDROCHLORIDE 10 ML: 20 JELLY TOPICAL at 14:06:00

## 2019-09-26 RX ADMIN — 0.9 % SODIUM CHLORIDE 17 ML: 0.9 % VIAL (ML) INJECTION at 14:06:00

## 2019-09-26 RX ADMIN — HEPARIN SODIUM 40000 UNITS: 10000 INJECTION, SOLUTION INTRAVENOUS; SUBCUTANEOUS at 14:06:00

## 2019-09-26 NOTE — PROCEDURES
Patient here for instill. Patient reports feeling increased bladder pain, urgency and frequency. Having additional stress d/t son moving back to college. Instill given per protocol. Patient catheterized for a residual of 40 ml. Chemstrip performed.   Pa

## 2019-10-02 ENCOUNTER — OFFICE VISIT (OUTPATIENT)
Dept: UROLOGY | Facility: HOSPITAL | Age: 59
End: 2019-10-02
Attending: OBSTETRICS & GYNECOLOGY
Payer: COMMERCIAL

## 2019-10-02 VITALS
BODY MASS INDEX: 30.29 KG/M2 | WEIGHT: 193 LBS | SYSTOLIC BLOOD PRESSURE: 128 MMHG | DIASTOLIC BLOOD PRESSURE: 84 MMHG | HEIGHT: 67 IN

## 2019-10-02 DIAGNOSIS — N30.10 IC (INTERSTITIAL CYSTITIS): Primary | ICD-10-CM

## 2019-10-02 DIAGNOSIS — R39.15 URGENCY OF URINATION: ICD-10-CM

## 2019-10-02 DIAGNOSIS — R35.0 FREQUENCY OF URINATION: ICD-10-CM

## 2019-10-02 PROCEDURE — 51700 IRRIGATION OF BLADDER: CPT

## 2019-10-02 PROCEDURE — 87086 URINE CULTURE/COLONY COUNT: CPT

## 2019-10-02 PROCEDURE — 81002 URINALYSIS NONAUTO W/O SCOPE: CPT

## 2019-10-02 RX ORDER — 0.9 % SODIUM CHLORIDE 0.9 %
17 VIAL (ML) INJECTION ONCE
Status: COMPLETED | OUTPATIENT
Start: 2019-10-02 | End: 2019-10-02

## 2019-10-02 RX ORDER — LIDOCAINE HYDROCHLORIDE 20 MG/ML
10 JELLY TOPICAL ONCE
Status: COMPLETED | OUTPATIENT
Start: 2019-10-02 | End: 2019-10-02

## 2019-10-02 RX ORDER — HEPARIN SODIUM 10000 [USP'U]/ML
40000 INJECTION, SOLUTION INTRAVENOUS; SUBCUTANEOUS ONCE
Status: COMPLETED | OUTPATIENT
Start: 2019-10-02 | End: 2019-10-02

## 2019-10-02 RX ADMIN — 0.9 % SODIUM CHLORIDE 17 ML: 0.9 % VIAL (ML) INJECTION at 13:30:00

## 2019-10-02 RX ADMIN — HEPARIN SODIUM 40000 UNITS: 10000 INJECTION, SOLUTION INTRAVENOUS; SUBCUTANEOUS at 13:30:00

## 2019-10-02 RX ADMIN — LIDOCAINE HYDROCHLORIDE 10 ML: 20 JELLY TOPICAL at 13:30:00

## 2019-10-02 NOTE — PROCEDURES
Patient here for instill. Patient crying reporting a lot of pain. Pt reports being unable to function in dly activities. Has been in bed for 2-3 days. Has been using vag valium and medical cannabis without relief. Instill given per protocol.   Patient ca

## 2019-10-04 ENCOUNTER — TELEPHONE (OUTPATIENT)
Dept: UROLOGY | Facility: HOSPITAL | Age: 59
End: 2019-10-04

## 2019-10-04 NOTE — TELEPHONE ENCOUNTER
.Phoned patient and informed of normal urine culture results. Patient verbalized an understanding. Pt reports feeling better after instill, feeling like she is having painful bladder flare that is relieved by instill.

## 2019-10-16 ENCOUNTER — OFFICE VISIT (OUTPATIENT)
Dept: UROLOGY | Facility: HOSPITAL | Age: 59
End: 2019-10-16
Attending: OBSTETRICS & GYNECOLOGY
Payer: COMMERCIAL

## 2019-10-16 VITALS
SYSTOLIC BLOOD PRESSURE: 124 MMHG | HEIGHT: 67 IN | DIASTOLIC BLOOD PRESSURE: 82 MMHG | BODY MASS INDEX: 30.29 KG/M2 | WEIGHT: 193 LBS

## 2019-10-16 DIAGNOSIS — R35.0 FREQUENCY OF URINATION: ICD-10-CM

## 2019-10-16 DIAGNOSIS — R39.15 URGENCY OF URINATION: ICD-10-CM

## 2019-10-16 DIAGNOSIS — N30.10 IC (INTERSTITIAL CYSTITIS): Primary | ICD-10-CM

## 2019-10-16 PROCEDURE — 51700 IRRIGATION OF BLADDER: CPT

## 2019-10-16 PROCEDURE — 81002 URINALYSIS NONAUTO W/O SCOPE: CPT

## 2019-10-16 RX ORDER — HEPARIN SODIUM 10000 [USP'U]/ML
40000 INJECTION, SOLUTION INTRAVENOUS; SUBCUTANEOUS ONCE
Status: COMPLETED | OUTPATIENT
Start: 2019-10-16 | End: 2019-10-16

## 2019-10-16 RX ORDER — 0.9 % SODIUM CHLORIDE 0.9 %
17 VIAL (ML) INJECTION ONCE
Status: COMPLETED | OUTPATIENT
Start: 2019-10-16 | End: 2019-10-16

## 2019-10-16 RX ORDER — LIDOCAINE HYDROCHLORIDE 20 MG/ML
10 JELLY TOPICAL ONCE
Status: COMPLETED | OUTPATIENT
Start: 2019-10-16 | End: 2019-10-16

## 2019-10-16 RX ADMIN — LIDOCAINE HYDROCHLORIDE 10 ML: 20 JELLY TOPICAL at 14:30:00

## 2019-10-16 RX ADMIN — HEPARIN SODIUM 40000 UNITS: 10000 INJECTION, SOLUTION INTRAVENOUS; SUBCUTANEOUS at 14:30:00

## 2019-10-16 RX ADMIN — 0.9 % SODIUM CHLORIDE 17 ML: 0.9 % VIAL (ML) INJECTION at 14:30:00

## 2019-10-16 NOTE — PROCEDURES
Patient here for instill. Patient reports feeling bladder pain, urgency and frequency. Instill given per protocol. Patient catheterized for a residual of 120 ml. (pt did not void prior to instill). Chemstrip performed. Patient tolerated procedure well.

## 2019-10-28 ENCOUNTER — OFFICE VISIT (OUTPATIENT)
Dept: UROLOGY | Facility: HOSPITAL | Age: 59
End: 2019-10-28
Attending: OBSTETRICS & GYNECOLOGY
Payer: COMMERCIAL

## 2019-10-28 ENCOUNTER — LAB ENCOUNTER (OUTPATIENT)
Dept: LAB | Facility: HOSPITAL | Age: 59
End: 2019-10-28
Attending: OBSTETRICS & GYNECOLOGY
Payer: COMMERCIAL

## 2019-10-28 VITALS — BODY MASS INDEX: 30.76 KG/M2 | HEIGHT: 67 IN | RESPIRATION RATE: 20 BRPM | WEIGHT: 196 LBS

## 2019-10-28 DIAGNOSIS — R35.0 FREQUENCY OF URINATION: ICD-10-CM

## 2019-10-28 DIAGNOSIS — R39.15 URGENCY OF URINATION: ICD-10-CM

## 2019-10-28 DIAGNOSIS — M23.322 DERANGEMENT OF POSTERIOR HORN OF MEDIAL MENISCUS, LEFT: Primary | ICD-10-CM

## 2019-10-28 DIAGNOSIS — N30.10 IC (INTERSTITIAL CYSTITIS): Primary | ICD-10-CM

## 2019-10-28 PROCEDURE — 93010 ELECTROCARDIOGRAM REPORT: CPT | Performed by: INTERNAL MEDICINE

## 2019-10-28 PROCEDURE — 80053 COMPREHEN METABOLIC PANEL: CPT

## 2019-10-28 PROCEDURE — 81002 URINALYSIS NONAUTO W/O SCOPE: CPT

## 2019-10-28 PROCEDURE — 85025 COMPLETE CBC W/AUTO DIFF WBC: CPT

## 2019-10-28 PROCEDURE — 51700 IRRIGATION OF BLADDER: CPT

## 2019-10-28 PROCEDURE — 93005 ELECTROCARDIOGRAM TRACING: CPT

## 2019-10-28 PROCEDURE — 36415 COLL VENOUS BLD VENIPUNCTURE: CPT

## 2019-10-28 RX ORDER — HEPARIN SODIUM 10000 [USP'U]/ML
40000 INJECTION, SOLUTION INTRAVENOUS; SUBCUTANEOUS ONCE
Status: COMPLETED | OUTPATIENT
Start: 2019-10-28 | End: 2019-10-28

## 2019-10-28 RX ORDER — LIDOCAINE HYDROCHLORIDE 20 MG/ML
10 JELLY TOPICAL ONCE
Status: COMPLETED | OUTPATIENT
Start: 2019-10-28 | End: 2019-10-28

## 2019-10-28 RX ORDER — 0.9 % SODIUM CHLORIDE 0.9 %
17 VIAL (ML) INJECTION ONCE
Status: COMPLETED | OUTPATIENT
Start: 2019-10-28 | End: 2019-10-28

## 2019-10-28 RX ADMIN — 0.9 % SODIUM CHLORIDE 17 ML: 0.9 % VIAL (ML) INJECTION at 13:19:00

## 2019-10-28 RX ADMIN — LIDOCAINE HYDROCHLORIDE 10 ML: 20 JELLY TOPICAL at 13:19:00

## 2019-10-28 RX ADMIN — HEPARIN SODIUM 40000 UNITS: 10000 INJECTION, SOLUTION INTRAVENOUS; SUBCUTANEOUS at 13:19:00

## 2019-10-28 NOTE — PROCEDURES
Patient here for instill. Patient reports feeling urgency frequency and bladder pain. Instill given per protocol. Patient catheterized for a residual of 130 ml. Chemstrip performed. Patient tolerated procedure well.   Next instill scheduled for next wk

## 2019-11-04 ENCOUNTER — LAB ENCOUNTER (OUTPATIENT)
Dept: LAB | Facility: HOSPITAL | Age: 59
End: 2019-11-04
Attending: ORTHOPAEDIC SURGERY
Payer: COMMERCIAL

## 2019-11-04 DIAGNOSIS — R10.9 STOMACH ACHE: ICD-10-CM

## 2019-11-04 DIAGNOSIS — R53.83 FATIGUE: ICD-10-CM

## 2019-11-04 DIAGNOSIS — F40.00: ICD-10-CM

## 2019-11-04 DIAGNOSIS — M23.322 DERANGEMENT OF POSTERIOR HORN OF MEDIAL MENISCUS, LEFT: ICD-10-CM

## 2019-11-04 DIAGNOSIS — R10.9 ABDOMINAL PAIN: ICD-10-CM

## 2019-11-04 DIAGNOSIS — F32.2 SEVERE MAJOR DEPRESSION WITHOUT PSYCHOTIC FEATURES (HCC): ICD-10-CM

## 2019-11-04 DIAGNOSIS — R63.5 ABNORMAL WEIGHT GAIN: Primary | ICD-10-CM

## 2019-11-04 DIAGNOSIS — R53.81 DEBILITY: ICD-10-CM

## 2019-11-04 DIAGNOSIS — R63.5 UNEXPLAINED WEIGHT GAIN: ICD-10-CM

## 2019-11-04 PROCEDURE — 82378 CARCINOEMBRYONIC ANTIGEN: CPT

## 2019-11-04 PROCEDURE — 84146 ASSAY OF PROLACTIN: CPT

## 2019-11-04 PROCEDURE — 83090 ASSAY OF HOMOCYSTEINE: CPT

## 2019-11-04 PROCEDURE — 84305 ASSAY OF SOMATOMEDIN: CPT

## 2019-11-04 PROCEDURE — 82607 VITAMIN B-12: CPT

## 2019-11-04 PROCEDURE — 82533 TOTAL CORTISOL: CPT

## 2019-11-04 PROCEDURE — 81015 MICROSCOPIC EXAM OF URINE: CPT

## 2019-11-04 PROCEDURE — 82306 VITAMIN D 25 HYDROXY: CPT

## 2019-11-04 PROCEDURE — 83036 HEMOGLOBIN GLYCOSYLATED A1C: CPT

## 2019-11-04 PROCEDURE — 82024 ASSAY OF ACTH: CPT

## 2019-11-04 PROCEDURE — 83880 ASSAY OF NATRIURETIC PEPTIDE: CPT

## 2019-11-04 PROCEDURE — 83970 ASSAY OF PARATHORMONE: CPT

## 2019-11-04 PROCEDURE — 84443 ASSAY THYROID STIM HORMONE: CPT

## 2019-11-04 PROCEDURE — 86304 IMMUNOASSAY TUMOR CA 125: CPT

## 2019-11-04 PROCEDURE — 86301 IMMUNOASSAY TUMOR CA 19-9: CPT

## 2019-11-04 PROCEDURE — 84436 ASSAY OF TOTAL THYROXINE: CPT

## 2019-11-04 PROCEDURE — 84482 T3 REVERSE: CPT

## 2019-11-04 PROCEDURE — 84480 ASSAY TRIIODOTHYRONINE (T3): CPT

## 2019-11-04 PROCEDURE — 36415 COLL VENOUS BLD VENIPUNCTURE: CPT

## 2019-11-04 PROCEDURE — 86800 THYROGLOBULIN ANTIBODY: CPT

## 2019-11-07 ENCOUNTER — OFFICE VISIT (OUTPATIENT)
Dept: UROLOGY | Facility: HOSPITAL | Age: 59
End: 2019-11-07
Attending: OBSTETRICS & GYNECOLOGY
Payer: COMMERCIAL

## 2019-11-07 VITALS
SYSTOLIC BLOOD PRESSURE: 126 MMHG | HEIGHT: 67 IN | DIASTOLIC BLOOD PRESSURE: 80 MMHG | BODY MASS INDEX: 30.76 KG/M2 | WEIGHT: 196 LBS

## 2019-11-07 DIAGNOSIS — R39.15 URGENCY OF URINATION: ICD-10-CM

## 2019-11-07 DIAGNOSIS — N30.10 IC (INTERSTITIAL CYSTITIS): Primary | ICD-10-CM

## 2019-11-07 DIAGNOSIS — R35.0 FREQUENCY OF URINATION: ICD-10-CM

## 2019-11-07 PROCEDURE — 51700 IRRIGATION OF BLADDER: CPT

## 2019-11-07 PROCEDURE — 81002 URINALYSIS NONAUTO W/O SCOPE: CPT

## 2019-11-07 RX ORDER — 0.9 % SODIUM CHLORIDE 0.9 %
17 VIAL (ML) INJECTION ONCE
Status: DISCONTINUED | OUTPATIENT
Start: 2019-11-07 | End: 2019-11-07 | Stop reason: ALTCHOICE

## 2019-11-07 RX ORDER — LIDOCAINE HYDROCHLORIDE 20 MG/ML
10 JELLY TOPICAL ONCE
Status: DISCONTINUED | OUTPATIENT
Start: 2019-11-07 | End: 2019-11-07 | Stop reason: ALTCHOICE

## 2019-11-07 RX ORDER — HEPARIN SODIUM 10000 [USP'U]/ML
40000 INJECTION, SOLUTION INTRAVENOUS; SUBCUTANEOUS ONCE
Status: DISCONTINUED | OUTPATIENT
Start: 2019-11-07 | End: 2019-11-07 | Stop reason: ALTCHOICE

## 2019-11-07 RX ADMIN — 0.9 % SODIUM CHLORIDE 17 ML: 0.9 % VIAL (ML) INJECTION at 11:30:00

## 2019-11-07 RX ADMIN — HEPARIN SODIUM 40000 UNITS: 10000 INJECTION, SOLUTION INTRAVENOUS; SUBCUTANEOUS at 11:30:00

## 2019-11-07 RX ADMIN — LIDOCAINE HYDROCHLORIDE 10 ML: 20 JELLY TOPICAL at 11:30:00

## 2019-11-07 NOTE — PROCEDURES
Patient here for instill. Patient reports feeling bladder pain, urgency, frequency. Instill given per protocol. Patient catheterized for a residual of 5 ml. Chemstrip performed. Patient tolerated procedure well. Next instill scheduled for next week.

## 2019-11-14 ENCOUNTER — OFFICE VISIT (OUTPATIENT)
Dept: UROLOGY | Facility: HOSPITAL | Age: 59
End: 2019-11-14
Attending: OBSTETRICS & GYNECOLOGY
Payer: COMMERCIAL

## 2019-11-14 ENCOUNTER — APPOINTMENT (OUTPATIENT)
Dept: CT IMAGING | Facility: HOSPITAL | Age: 59
End: 2019-11-14
Attending: Other
Payer: COMMERCIAL

## 2019-11-14 VITALS — BODY MASS INDEX: 30.76 KG/M2 | HEIGHT: 67 IN | WEIGHT: 196 LBS

## 2019-11-14 DIAGNOSIS — N30.10 IC (INTERSTITIAL CYSTITIS): Primary | ICD-10-CM

## 2019-11-14 DIAGNOSIS — R39.15 URGENCY OF URINATION: ICD-10-CM

## 2019-11-14 DIAGNOSIS — R35.0 FREQUENCY OF URINATION: ICD-10-CM

## 2019-11-14 PROCEDURE — 51700 IRRIGATION OF BLADDER: CPT

## 2019-11-14 PROCEDURE — 81002 URINALYSIS NONAUTO W/O SCOPE: CPT

## 2019-11-14 RX ORDER — HEPARIN SODIUM 10000 [USP'U]/ML
40000 INJECTION, SOLUTION INTRAVENOUS; SUBCUTANEOUS ONCE
Status: COMPLETED | OUTPATIENT
Start: 2019-11-14 | End: 2019-11-14

## 2019-11-14 RX ORDER — LIDOCAINE HYDROCHLORIDE 20 MG/ML
10 JELLY TOPICAL ONCE
Status: COMPLETED | OUTPATIENT
Start: 2019-11-14 | End: 2019-11-14

## 2019-11-14 RX ORDER — 0.9 % SODIUM CHLORIDE 0.9 %
17 VIAL (ML) INJECTION ONCE
Status: COMPLETED | OUTPATIENT
Start: 2019-11-14 | End: 2019-11-14

## 2019-11-14 RX ORDER — BUPIVACAINE HYDROCHLORIDE 5 MG/ML
30 INJECTION, SOLUTION EPIDURAL; INTRACAUDAL ONCE
Status: COMPLETED | OUTPATIENT
Start: 2019-11-14 | End: 2019-11-14

## 2019-11-14 RX ADMIN — 0.9 % SODIUM CHLORIDE 17 ML: 0.9 % VIAL (ML) INJECTION at 11:35:00

## 2019-11-14 RX ADMIN — BUPIVACAINE HYDROCHLORIDE 30 ML: 5 INJECTION, SOLUTION EPIDURAL; INTRACAUDAL at 11:35:00

## 2019-11-14 RX ADMIN — HEPARIN SODIUM 40000 UNITS: 10000 INJECTION, SOLUTION INTRAVENOUS; SUBCUTANEOUS at 11:35:00

## 2019-11-14 RX ADMIN — LIDOCAINE HYDROCHLORIDE 10 ML: 20 JELLY TOPICAL at 11:35:00

## 2019-11-14 NOTE — PROCEDURES
Patient here for instill. Patient reports feeling bladder pain, urgency and frequency. Instill given per protocol. Patient catheterized for a residual of 80 ml. Chemstrip performed. Patient tolerated procedure well. Next instill not scheduled.  Pt goi

## 2019-12-02 ENCOUNTER — OFFICE VISIT (OUTPATIENT)
Dept: UROLOGY | Facility: HOSPITAL | Age: 59
End: 2019-12-02
Attending: OBSTETRICS & GYNECOLOGY
Payer: COMMERCIAL

## 2019-12-02 VITALS — BODY MASS INDEX: 30.76 KG/M2 | WEIGHT: 196 LBS | RESPIRATION RATE: 16 BRPM | HEIGHT: 67 IN

## 2019-12-02 DIAGNOSIS — R35.0 FREQUENCY OF URINATION: Primary | ICD-10-CM

## 2019-12-02 DIAGNOSIS — N30.10 CHRONIC INTERSTITIAL CYSTITIS: ICD-10-CM

## 2019-12-02 DIAGNOSIS — R39.15 URGENCY OF URINATION: ICD-10-CM

## 2019-12-02 PROCEDURE — 51700 IRRIGATION OF BLADDER: CPT

## 2019-12-02 PROCEDURE — 81002 URINALYSIS NONAUTO W/O SCOPE: CPT

## 2019-12-02 RX ORDER — LIDOCAINE HYDROCHLORIDE 20 MG/ML
10 JELLY TOPICAL ONCE
Status: COMPLETED | OUTPATIENT
Start: 2019-12-02 | End: 2019-12-02

## 2019-12-02 RX ORDER — BUPIVACAINE HYDROCHLORIDE 5 MG/ML
30 INJECTION, SOLUTION EPIDURAL; INTRACAUDAL ONCE
Status: COMPLETED | OUTPATIENT
Start: 2019-12-02 | End: 2019-12-02

## 2019-12-02 RX ORDER — HEPARIN SODIUM 10000 [USP'U]/ML
40000 INJECTION, SOLUTION INTRAVENOUS; SUBCUTANEOUS ONCE
Status: COMPLETED | OUTPATIENT
Start: 2019-12-02 | End: 2019-12-02

## 2019-12-02 RX ADMIN — LIDOCAINE HYDROCHLORIDE 10 ML: 20 JELLY TOPICAL at 13:30:00

## 2019-12-02 RX ADMIN — BUPIVACAINE HYDROCHLORIDE 30 ML: 5 INJECTION, SOLUTION EPIDURAL; INTRACAUDAL at 13:30:00

## 2019-12-02 RX ADMIN — HEPARIN SODIUM 40000 UNITS: 10000 INJECTION, SOLUTION INTRAVENOUS; SUBCUTANEOUS at 13:30:00

## 2019-12-02 NOTE — PROCEDURES
.Patient here for rescue instill. Patient reports having a better week after starting new instills, feels more comfort for longer period. Pt tearful, reports she is having personal issues and possible divorce. Comfort measures provided.   Instill given p

## 2019-12-04 ENCOUNTER — TELEPHONE (OUTPATIENT)
Dept: UROLOGY | Facility: HOSPITAL | Age: 59
End: 2019-12-04

## 2019-12-04 PROBLEM — G89.29 OTHER CHRONIC PAIN: Status: ACTIVE | Noted: 2019-12-04

## 2019-12-04 NOTE — TELEPHONE ENCOUNTER
Pt called stating the instill she received on Monday, she was unable to hold for the 2 hours after receiving. She feels she is worse than when she came in, wondering if she can some in for another one, because the first one worked so well.   Also checking Principal Discharge DX:	Thumb laceration, left, initial encounter  Secondary Diagnosis:	Extensor tendon disruption

## 2019-12-26 ENCOUNTER — OFFICE VISIT (OUTPATIENT)
Dept: UROLOGY | Facility: HOSPITAL | Age: 59
End: 2019-12-26
Attending: OBSTETRICS & GYNECOLOGY
Payer: COMMERCIAL

## 2019-12-26 VITALS — HEIGHT: 67 IN | BODY MASS INDEX: 29.51 KG/M2 | RESPIRATION RATE: 16 BRPM | WEIGHT: 188 LBS

## 2019-12-26 DIAGNOSIS — R39.15 URGENCY OF URINATION: Primary | ICD-10-CM

## 2019-12-26 PROCEDURE — 81002 URINALYSIS NONAUTO W/O SCOPE: CPT

## 2019-12-26 PROCEDURE — 51700 IRRIGATION OF BLADDER: CPT

## 2019-12-26 RX ORDER — CYCLOBENZAPRINE HCL 10 MG
10 TABLET ORAL 3 TIMES DAILY PRN
COMMUNITY
End: 2021-03-30

## 2019-12-26 RX ORDER — QUETIAPINE 100 MG/1
100 TABLET, FILM COATED ORAL
COMMUNITY

## 2019-12-26 RX ORDER — LIDOCAINE HYDROCHLORIDE 20 MG/ML
10 JELLY TOPICAL ONCE
Status: COMPLETED | OUTPATIENT
Start: 2019-12-26 | End: 2019-12-26

## 2019-12-26 RX ORDER — GABAPENTIN 100 MG/1
100 CAPSULE ORAL NIGHTLY
COMMUNITY
End: 2020-02-24

## 2019-12-26 RX ORDER — 0.9 % SODIUM CHLORIDE 0.9 %
17 VIAL (ML) INJECTION ONCE
Status: COMPLETED | OUTPATIENT
Start: 2019-12-26 | End: 2019-12-26

## 2019-12-26 RX ORDER — BUPIVACAINE HYDROCHLORIDE 5 MG/ML
30 INJECTION, SOLUTION EPIDURAL; INTRACAUDAL ONCE
Status: COMPLETED | OUTPATIENT
Start: 2019-12-26 | End: 2019-12-26

## 2019-12-26 RX ORDER — HEPARIN SODIUM 10000 [USP'U]/ML
40000 INJECTION, SOLUTION INTRAVENOUS; SUBCUTANEOUS ONCE
Status: COMPLETED | OUTPATIENT
Start: 2019-12-26 | End: 2019-12-26

## 2019-12-26 RX ADMIN — BUPIVACAINE HYDROCHLORIDE 30 ML: 5 INJECTION, SOLUTION EPIDURAL; INTRACAUDAL at 13:33:00

## 2019-12-26 RX ADMIN — HEPARIN SODIUM 40000 UNITS: 10000 INJECTION, SOLUTION INTRAVENOUS; SUBCUTANEOUS at 13:33:00

## 2019-12-26 RX ADMIN — LIDOCAINE HYDROCHLORIDE 10 ML: 20 JELLY TOPICAL at 13:33:00

## 2019-12-26 RX ADMIN — 0.9 % SODIUM CHLORIDE 17 ML: 0.9 % VIAL (ML) INJECTION at 13:30:00

## 2019-12-26 NOTE — PROCEDURES
.Patient here for instill  RESCUE -last instill was 12/02/19 - bladder discomfort began 3 days ago while out of country on vacation - Also reports increase in constipation - Was using OTC laxative - Encouraged use of bowel regime and information provided .

## 2020-01-06 ENCOUNTER — OFFICE VISIT (OUTPATIENT)
Dept: UROLOGY | Facility: HOSPITAL | Age: 60
End: 2020-01-06
Attending: OBSTETRICS & GYNECOLOGY
Payer: COMMERCIAL

## 2020-01-06 VITALS
BODY MASS INDEX: 27.31 KG/M2 | SYSTOLIC BLOOD PRESSURE: 130 MMHG | WEIGHT: 174 LBS | HEIGHT: 67 IN | DIASTOLIC BLOOD PRESSURE: 80 MMHG

## 2020-01-06 DIAGNOSIS — R39.15 URGENCY OF URINATION: Primary | ICD-10-CM

## 2020-01-06 DIAGNOSIS — N30.10 CHRONIC INTERSTITIAL CYSTITIS: ICD-10-CM

## 2020-01-06 PROCEDURE — 81002 URINALYSIS NONAUTO W/O SCOPE: CPT

## 2020-01-06 PROCEDURE — 51700 IRRIGATION OF BLADDER: CPT

## 2020-01-06 RX ORDER — 0.9 % SODIUM CHLORIDE 0.9 %
17 VIAL (ML) INJECTION ONCE
Status: DISCONTINUED | OUTPATIENT
Start: 2020-01-06 | End: 2020-01-30 | Stop reason: ALTCHOICE

## 2020-01-06 RX ORDER — LIDOCAINE HYDROCHLORIDE 20 MG/ML
10 JELLY TOPICAL ONCE
Status: DISCONTINUED | OUTPATIENT
Start: 2020-01-06 | End: 2020-01-30 | Stop reason: ALTCHOICE

## 2020-01-06 RX ORDER — HEPARIN SODIUM 10000 [USP'U]/ML
40000 INJECTION, SOLUTION INTRAVENOUS; SUBCUTANEOUS ONCE
Status: DISCONTINUED | OUTPATIENT
Start: 2020-01-06 | End: 2020-01-30 | Stop reason: ALTCHOICE

## 2020-01-06 RX ORDER — BUPIVACAINE HYDROCHLORIDE 5 MG/ML
30 INJECTION, SOLUTION EPIDURAL; INTRACAUDAL ONCE
Status: DISCONTINUED | OUTPATIENT
Start: 2020-01-06 | End: 2020-01-30 | Stop reason: ALTCHOICE

## 2020-01-20 ENCOUNTER — OFFICE VISIT (OUTPATIENT)
Dept: UROLOGY | Facility: HOSPITAL | Age: 60
End: 2020-01-20
Attending: OBSTETRICS & GYNECOLOGY
Payer: COMMERCIAL

## 2020-01-20 VITALS — BODY MASS INDEX: 27.31 KG/M2 | WEIGHT: 174 LBS | RESPIRATION RATE: 14 BRPM | HEIGHT: 67 IN

## 2020-01-20 DIAGNOSIS — R39.15 URGENCY OF URINATION: Primary | ICD-10-CM

## 2020-01-20 DIAGNOSIS — N30.10 CHRONIC INTERSTITIAL CYSTITIS: ICD-10-CM

## 2020-01-20 PROCEDURE — 81002 URINALYSIS NONAUTO W/O SCOPE: CPT

## 2020-01-20 PROCEDURE — 51700 IRRIGATION OF BLADDER: CPT

## 2020-01-20 RX ORDER — LIDOCAINE HYDROCHLORIDE 20 MG/ML
10 JELLY TOPICAL ONCE
Status: COMPLETED | OUTPATIENT
Start: 2020-01-20 | End: 2020-01-21

## 2020-01-20 RX ORDER — BUPIVACAINE HYDROCHLORIDE 5 MG/ML
30 INJECTION, SOLUTION EPIDURAL; INTRACAUDAL ONCE
Status: COMPLETED | OUTPATIENT
Start: 2020-01-20 | End: 2020-01-21

## 2020-01-20 RX ORDER — 0.9 % SODIUM CHLORIDE 0.9 %
17 VIAL (ML) INJECTION ONCE
Status: COMPLETED | OUTPATIENT
Start: 2020-01-20 | End: 2020-01-21

## 2020-01-20 RX ORDER — HEPARIN SODIUM 10000 [USP'U]/ML
40000 INJECTION, SOLUTION INTRAVENOUS; SUBCUTANEOUS ONCE
Status: COMPLETED | OUTPATIENT
Start: 2020-01-20 | End: 2020-01-21

## 2020-01-20 NOTE — PROCEDURES
.Patient here for instill #3. Patient reports feeling about the same, unable to hold instill for 2-3 hours. Finds has to go after 1-2 hours. Instill given per protocol. Patient catheterized for a residual of 40ml. Chemstrip performed.   Patient tolerat

## 2020-01-21 RX ADMIN — 0.9 % SODIUM CHLORIDE 17 ML: 0.9 % VIAL (ML) INJECTION at 12:11:00

## 2020-01-21 RX ADMIN — BUPIVACAINE HYDROCHLORIDE 30 ML: 5 INJECTION, SOLUTION EPIDURAL; INTRACAUDAL at 12:10:00

## 2020-01-21 RX ADMIN — HEPARIN SODIUM 40000 UNITS: 10000 INJECTION, SOLUTION INTRAVENOUS; SUBCUTANEOUS at 12:11:00

## 2020-01-21 RX ADMIN — LIDOCAINE HYDROCHLORIDE 10 ML: 20 JELLY TOPICAL at 12:11:00

## 2020-01-30 ENCOUNTER — OFFICE VISIT (OUTPATIENT)
Dept: UROLOGY | Facility: HOSPITAL | Age: 60
End: 2020-01-30
Attending: OBSTETRICS & GYNECOLOGY
Payer: COMMERCIAL

## 2020-01-30 VITALS
WEIGHT: 174 LBS | DIASTOLIC BLOOD PRESSURE: 80 MMHG | HEIGHT: 67 IN | SYSTOLIC BLOOD PRESSURE: 122 MMHG | BODY MASS INDEX: 27.31 KG/M2

## 2020-01-30 DIAGNOSIS — R39.15 URGENCY OF URINATION: ICD-10-CM

## 2020-01-30 DIAGNOSIS — R35.0 FREQUENCY OF URINATION: ICD-10-CM

## 2020-01-30 DIAGNOSIS — N30.10 CHRONIC INTERSTITIAL CYSTITIS: Primary | ICD-10-CM

## 2020-01-30 LAB
CONTROL RUN WITHIN 24 HOURS?: YES
LEUKOCYTE ESTERASE URINE: NEGATIVE
NITRITE URINE: NEGATIVE

## 2020-01-30 PROCEDURE — 51700 IRRIGATION OF BLADDER: CPT

## 2020-01-30 PROCEDURE — 81002 URINALYSIS NONAUTO W/O SCOPE: CPT

## 2020-01-30 RX ORDER — BUPIVACAINE HYDROCHLORIDE 5 MG/ML
30 INJECTION, SOLUTION EPIDURAL; INTRACAUDAL ONCE
Status: COMPLETED | OUTPATIENT
Start: 2020-01-30 | End: 2020-01-30

## 2020-01-30 RX ORDER — LIDOCAINE HYDROCHLORIDE 20 MG/ML
10 JELLY TOPICAL ONCE
Status: COMPLETED | OUTPATIENT
Start: 2020-01-30 | End: 2020-01-30

## 2020-01-30 RX ORDER — 0.9 % SODIUM CHLORIDE 0.9 %
17 VIAL (ML) INJECTION ONCE
Status: COMPLETED | OUTPATIENT
Start: 2020-01-30 | End: 2020-01-30

## 2020-01-30 RX ORDER — HEPARIN SODIUM 10000 [USP'U]/ML
40000 INJECTION, SOLUTION INTRAVENOUS; SUBCUTANEOUS ONCE
Status: COMPLETED | OUTPATIENT
Start: 2020-01-30 | End: 2020-01-30

## 2020-01-30 RX ADMIN — HEPARIN SODIUM 40000 UNITS: 10000 INJECTION, SOLUTION INTRAVENOUS; SUBCUTANEOUS at 14:20:00

## 2020-01-30 RX ADMIN — LIDOCAINE HYDROCHLORIDE 10 ML: 20 JELLY TOPICAL at 14:20:00

## 2020-01-30 RX ADMIN — BUPIVACAINE HYDROCHLORIDE 30 ML: 5 INJECTION, SOLUTION EPIDURAL; INTRACAUDAL at 14:20:00

## 2020-01-30 RX ADMIN — 0.9 % SODIUM CHLORIDE 17 ML: 0.9 % VIAL (ML) INJECTION at 14:20:00

## 2020-01-30 NOTE — PROCEDURES
Patient here for instill. Patient reports feeling uncomfortable. C/o increased pain this week d/t a lrg hemorrhoid. Patient catheterized for a residual of 330ml. Pt did void prior to coming in exam room.  Pt admitted to not really paying much attention t

## 2020-02-24 PROBLEM — F13.21 BENZODIAZEPINE DEPENDENCE IN REMISSION (HCC): Status: ACTIVE | Noted: 2020-02-24

## 2020-06-05 PROBLEM — M96.0 PSEUDARTHROSIS AFTER FUSION OR ARTHRODESIS: Status: ACTIVE | Noted: 2020-06-05

## 2020-09-25 ENCOUNTER — TELEPHONE (OUTPATIENT)
Dept: UROLOGY | Facility: HOSPITAL | Age: 60
End: 2020-09-25

## 2020-09-25 NOTE — TELEPHONE ENCOUNTER
Called patient to inform her she needs to make appt. With Dr. Kristel Beckett. Last visit was 10/2/18. We received a refill request for Valium. Unable to refill until she schedules an appointment. Left message.    Called again on 10/2/20, no answer, left mess

## 2021-03-17 ENCOUNTER — TELEPHONE (OUTPATIENT)
Dept: UROLOGY | Facility: HOSPITAL | Age: 61
End: 2021-03-17

## 2021-03-17 NOTE — TELEPHONE ENCOUNTER
Pt called  wanting to make instill appt. They discussed w/ pt she hasn't been seen by Dr Blake Prieto since 10.2018. Last instill w/ RN was 1/30/20. We spoke to Dr Efraín Deng, who said pt could have instill series and then needs f/u w/ her.  Pt appt scheduled

## 2021-03-18 ENCOUNTER — OFFICE VISIT (OUTPATIENT)
Dept: UROLOGY | Facility: HOSPITAL | Age: 61
End: 2021-03-18
Attending: OBSTETRICS & GYNECOLOGY
Payer: COMMERCIAL

## 2021-03-18 VITALS — WEIGHT: 190 LBS | TEMPERATURE: 97 F | HEIGHT: 70 IN | BODY MASS INDEX: 27.2 KG/M2

## 2021-03-18 DIAGNOSIS — R35.0 FREQUENCY OF URINATION: ICD-10-CM

## 2021-03-18 DIAGNOSIS — N30.10 CHRONIC INTERSTITIAL CYSTITIS: Primary | ICD-10-CM

## 2021-03-18 DIAGNOSIS — N89.8 VAGINAL ODOR: ICD-10-CM

## 2021-03-18 PROCEDURE — 51700 IRRIGATION OF BLADDER: CPT

## 2021-03-18 PROCEDURE — 87480 CANDIDA DNA DIR PROBE: CPT

## 2021-03-18 PROCEDURE — 81002 URINALYSIS NONAUTO W/O SCOPE: CPT

## 2021-03-18 PROCEDURE — 87660 TRICHOMONAS VAGIN DIR PROBE: CPT

## 2021-03-18 PROCEDURE — 87510 GARDNER VAG DNA DIR PROBE: CPT

## 2021-03-18 RX ORDER — SODIUM CHLORIDE 9 MG/ML
17 INJECTION INTRAVENOUS ONCE
Status: COMPLETED | OUTPATIENT
Start: 2021-03-18 | End: 2021-03-18

## 2021-03-18 RX ORDER — HEPARIN SODIUM 10000 [USP'U]/ML
40000 INJECTION, SOLUTION INTRAVENOUS; SUBCUTANEOUS ONCE
Status: COMPLETED | OUTPATIENT
Start: 2021-03-18 | End: 2021-03-18

## 2021-03-18 RX ORDER — LIDOCAINE HYDROCHLORIDE 20 MG/ML
10 JELLY TOPICAL ONCE
Status: COMPLETED | OUTPATIENT
Start: 2021-03-18 | End: 2021-03-18

## 2021-03-18 RX ORDER — BUPIVACAINE HYDROCHLORIDE 5 MG/ML
30 INJECTION, SOLUTION EPIDURAL; INTRACAUDAL ONCE
Status: COMPLETED | OUTPATIENT
Start: 2021-03-18 | End: 2021-03-18

## 2021-03-18 RX ADMIN — HEPARIN SODIUM 40000 UNITS: 10000 INJECTION, SOLUTION INTRAVENOUS; SUBCUTANEOUS at 13:15:00

## 2021-03-18 RX ADMIN — BUPIVACAINE HYDROCHLORIDE 30 ML: 5 INJECTION, SOLUTION EPIDURAL; INTRACAUDAL at 13:15:00

## 2021-03-18 RX ADMIN — SODIUM CHLORIDE 17 ML: 9 INJECTION INTRAVENOUS at 13:15:00

## 2021-03-18 RX ADMIN — LIDOCAINE HYDROCHLORIDE 10 ML: 20 JELLY TOPICAL at 13:15:00

## 2021-03-18 NOTE — PROCEDURES
Patient here for instill #1. Patient reports feeling bladder pain and c/o vaginal odor. No discharge noted. Vaginal cx obtained. Instill given per protocol. Patient catheterized for a residual of 100 ml. Chemstrip performed.   Patient tolerated procedur

## 2021-03-19 ENCOUNTER — TELEPHONE (OUTPATIENT)
Dept: UROLOGY | Facility: HOSPITAL | Age: 61
End: 2021-03-19

## 2021-03-19 NOTE — TELEPHONE ENCOUNTER
.Phoned patient, [facility]  and informed of normal vaginal culture results. Patient to call with questions/concerns.

## 2021-03-30 ENCOUNTER — OFFICE VISIT (OUTPATIENT)
Dept: UROLOGY | Facility: HOSPITAL | Age: 61
End: 2021-03-30
Attending: OBSTETRICS & GYNECOLOGY
Payer: COMMERCIAL

## 2021-03-30 VITALS — WEIGHT: 198 LBS | HEIGHT: 70 IN | BODY MASS INDEX: 28.35 KG/M2 | TEMPERATURE: 97 F

## 2021-03-30 DIAGNOSIS — N30.10 CHRONIC INTERSTITIAL CYSTITIS: Primary | ICD-10-CM

## 2021-03-30 DIAGNOSIS — K59.00 CONSTIPATION: ICD-10-CM

## 2021-03-30 LAB
BLOOD URINE: NEGATIVE
CONTROL RUN WITHIN 24 HOURS?: YES
LEUKOCYTE ESTERASE URINE: NEGATIVE
NITRITE URINE: POSITIVE

## 2021-03-30 PROCEDURE — 51700 IRRIGATION OF BLADDER: CPT

## 2021-03-30 PROCEDURE — 81002 URINALYSIS NONAUTO W/O SCOPE: CPT

## 2021-03-30 RX ORDER — SODIUM CHLORIDE 9 MG/ML
17 INJECTION INTRAVENOUS ONCE
Status: COMPLETED | OUTPATIENT
Start: 2021-03-30 | End: 2021-03-30

## 2021-03-30 RX ORDER — HEPARIN SODIUM 10000 [USP'U]/ML
40000 INJECTION, SOLUTION INTRAVENOUS; SUBCUTANEOUS ONCE
Status: COMPLETED | OUTPATIENT
Start: 2021-03-30 | End: 2021-03-30

## 2021-03-30 RX ORDER — BUPIVACAINE HYDROCHLORIDE 5 MG/ML
30 INJECTION, SOLUTION EPIDURAL; INTRACAUDAL ONCE
Status: COMPLETED | OUTPATIENT
Start: 2021-03-30 | End: 2021-03-30

## 2021-03-30 RX ORDER — LIDOCAINE HYDROCHLORIDE 20 MG/ML
10 JELLY TOPICAL ONCE
Status: COMPLETED | OUTPATIENT
Start: 2021-03-30 | End: 2021-03-30

## 2021-03-30 RX ADMIN — LIDOCAINE HYDROCHLORIDE 10 ML: 20 JELLY TOPICAL at 15:46:00

## 2021-03-30 RX ADMIN — HEPARIN SODIUM 40000 UNITS: 10000 INJECTION, SOLUTION INTRAVENOUS; SUBCUTANEOUS at 15:46:00

## 2021-03-30 RX ADMIN — SODIUM CHLORIDE 17 ML: 9 INJECTION INTRAVENOUS at 15:46:00

## 2021-03-30 RX ADMIN — BUPIVACAINE HYDROCHLORIDE 30 ML: 5 INJECTION, SOLUTION EPIDURAL; INTRACAUDAL at 15:46:00

## 2021-03-30 NOTE — PROCEDURES
Patient here for instill # 2. Patient reports feeling bladder and pelvic pain. Pt reports constipation. Takes colace. Requesting a refill of vaginal valium. We have gone through IC in the past. [address] Endy Cardona, okay to order.      Instill given per aníbal

## 2021-04-12 ENCOUNTER — OFFICE VISIT (OUTPATIENT)
Dept: UROLOGY | Facility: HOSPITAL | Age: 61
End: 2021-04-12
Attending: OBSTETRICS & GYNECOLOGY
Payer: COMMERCIAL

## 2021-04-12 VITALS — WEIGHT: 198 LBS | HEIGHT: 70 IN | BODY MASS INDEX: 28.35 KG/M2 | TEMPERATURE: 98 F

## 2021-04-12 DIAGNOSIS — N30.10 CHRONIC INTERSTITIAL CYSTITIS: Primary | ICD-10-CM

## 2021-04-12 PROCEDURE — 81002 URINALYSIS NONAUTO W/O SCOPE: CPT

## 2021-04-12 PROCEDURE — 51700 IRRIGATION OF BLADDER: CPT

## 2021-04-12 RX ORDER — BUPIVACAINE HYDROCHLORIDE 5 MG/ML
30 INJECTION, SOLUTION EPIDURAL; INTRACAUDAL ONCE
Status: COMPLETED | OUTPATIENT
Start: 2021-04-12 | End: 2021-04-12

## 2021-04-12 RX ORDER — SODIUM CHLORIDE 9 MG/ML
17 INJECTION INTRAVENOUS ONCE
Status: COMPLETED | OUTPATIENT
Start: 2021-04-12 | End: 2021-04-12

## 2021-04-12 RX ORDER — HEPARIN SODIUM 10000 [USP'U]/ML
40000 INJECTION, SOLUTION INTRAVENOUS; SUBCUTANEOUS ONCE
Status: COMPLETED | OUTPATIENT
Start: 2021-04-12 | End: 2021-04-12

## 2021-04-12 RX ORDER — LIDOCAINE HYDROCHLORIDE 20 MG/ML
10 JELLY TOPICAL ONCE
Status: COMPLETED | OUTPATIENT
Start: 2021-04-12 | End: 2021-04-12

## 2021-04-12 RX ADMIN — BUPIVACAINE HYDROCHLORIDE 30 ML: 5 INJECTION, SOLUTION EPIDURAL; INTRACAUDAL at 15:00:00

## 2021-04-12 RX ADMIN — HEPARIN SODIUM 40000 UNITS: 10000 INJECTION, SOLUTION INTRAVENOUS; SUBCUTANEOUS at 15:00:00

## 2021-04-12 RX ADMIN — LIDOCAINE HYDROCHLORIDE 10 ML: 20 JELLY TOPICAL at 15:00:00

## 2021-04-12 RX ADMIN — SODIUM CHLORIDE 17 ML: 9 INJECTION INTRAVENOUS at 15:00:00

## 2021-04-12 NOTE — PROCEDURES
Patient here for instill. Patient reports feeling some bladder pain. Instill given per protocol. Patient catheterized for a residual of 120 ml, pt did not void at office today. Chemstrip performed. Patient tolerated procedure well.   Next instill sched

## 2021-08-13 ENCOUNTER — OFFICE VISIT (OUTPATIENT)
Dept: UROLOGY | Facility: HOSPITAL | Age: 61
End: 2021-08-13
Attending: OBSTETRICS & GYNECOLOGY
Payer: COMMERCIAL

## 2021-08-13 VITALS — TEMPERATURE: 97 F | BODY MASS INDEX: 28 KG/M2 | RESPIRATION RATE: 18 BRPM | WEIGHT: 198 LBS

## 2021-08-13 DIAGNOSIS — N95.2 POSTMENOPAUSAL ATROPHIC VAGINITIS: ICD-10-CM

## 2021-08-13 DIAGNOSIS — N30.10 CHRONIC INTERSTITIAL CYSTITIS: Primary | ICD-10-CM

## 2021-08-13 PROCEDURE — 81002 URINALYSIS NONAUTO W/O SCOPE: CPT

## 2021-08-13 PROCEDURE — 87086 URINE CULTURE/COLONY COUNT: CPT

## 2021-08-13 PROCEDURE — 51700 IRRIGATION OF BLADDER: CPT

## 2021-08-13 RX ORDER — ESTRADIOL 0.1 MG/G
CREAM VAGINAL
Qty: 42.5 G | Refills: 0 | Status: SHIPPED | OUTPATIENT
Start: 2021-08-13 | End: 2021-09-28

## 2021-08-13 RX ORDER — HEPARIN SODIUM 10000 [USP'U]/ML
40000 INJECTION, SOLUTION INTRAVENOUS; SUBCUTANEOUS ONCE
Status: COMPLETED | OUTPATIENT
Start: 2021-08-13 | End: 2021-08-13

## 2021-08-13 RX ORDER — SODIUM CHLORIDE 9 MG/ML
17 INJECTION INTRAVENOUS ONCE
Status: COMPLETED | OUTPATIENT
Start: 2021-08-13 | End: 2021-08-13

## 2021-08-13 RX ORDER — BUPIVACAINE HYDROCHLORIDE 5 MG/ML
30 INJECTION, SOLUTION EPIDURAL; INTRACAUDAL ONCE
Status: COMPLETED | OUTPATIENT
Start: 2021-08-13 | End: 2021-08-13

## 2021-08-13 RX ORDER — LIDOCAINE HYDROCHLORIDE 20 MG/ML
10 JELLY TOPICAL ONCE
Status: COMPLETED | OUTPATIENT
Start: 2021-08-13 | End: 2021-08-13

## 2021-08-13 RX ADMIN — BUPIVACAINE HYDROCHLORIDE 30 ML: 5 INJECTION, SOLUTION EPIDURAL; INTRACAUDAL at 15:05:00

## 2021-08-13 RX ADMIN — HEPARIN SODIUM 40000 UNITS: 10000 INJECTION, SOLUTION INTRAVENOUS; SUBCUTANEOUS at 15:06:00

## 2021-08-13 RX ADMIN — LIDOCAINE HYDROCHLORIDE 10 ML: 20 JELLY TOPICAL at 15:06:00

## 2021-08-13 RX ADMIN — SODIUM CHLORIDE 17 ML: 9 INJECTION INTRAVENOUS at 15:07:00

## 2021-08-13 NOTE — PROCEDURES
S:  Patient here for instill  ( rescue) given hx of bladder pain.   Patient reports feeling like she is in a flare - LV for instill was 4/12/21 Last actual visit with Dr. Paige Malone Oct 2018 - Patient has rescheduled these appts many times  Past instills hav

## 2021-09-09 ENCOUNTER — OFFICE VISIT (OUTPATIENT)
Dept: UROLOGY | Facility: HOSPITAL | Age: 61
End: 2021-09-09
Attending: OBSTETRICS & GYNECOLOGY
Payer: COMMERCIAL

## 2021-09-09 VITALS — WEIGHT: 198 LBS | TEMPERATURE: 97 F | HEIGHT: 70 IN | BODY MASS INDEX: 28.35 KG/M2

## 2021-09-09 DIAGNOSIS — N30.10 CHRONIC INTERSTITIAL CYSTITIS: Primary | ICD-10-CM

## 2021-09-09 LAB
CONTROL RUN WITHIN 24 HOURS?: YES
LEUKOCYTE ESTERASE URINE: NEGATIVE
NITRITE URINE: NEGATIVE

## 2021-09-09 PROCEDURE — 51700 IRRIGATION OF BLADDER: CPT

## 2021-09-09 PROCEDURE — 81002 URINALYSIS NONAUTO W/O SCOPE: CPT

## 2021-09-09 RX ORDER — LIDOCAINE HYDROCHLORIDE 20 MG/ML
10 JELLY TOPICAL ONCE
Status: COMPLETED | OUTPATIENT
Start: 2021-09-09 | End: 2021-09-09

## 2021-09-09 RX ORDER — BUPIVACAINE HYDROCHLORIDE 5 MG/ML
30 INJECTION, SOLUTION EPIDURAL; INTRACAUDAL ONCE
Status: COMPLETED | OUTPATIENT
Start: 2021-09-09 | End: 2021-09-09

## 2021-09-09 RX ORDER — HEPARIN SODIUM 10000 [USP'U]/ML
40000 INJECTION, SOLUTION INTRAVENOUS; SUBCUTANEOUS ONCE
Status: COMPLETED | OUTPATIENT
Start: 2021-09-09 | End: 2021-09-09

## 2021-09-09 RX ORDER — SODIUM CHLORIDE 9 MG/ML
17 INJECTION INTRAVENOUS ONCE
Status: COMPLETED | OUTPATIENT
Start: 2021-09-09 | End: 2021-09-09

## 2021-09-09 RX ADMIN — LIDOCAINE HYDROCHLORIDE 10 ML: 20 JELLY TOPICAL at 15:10:00

## 2021-09-09 RX ADMIN — SODIUM CHLORIDE 17 ML: 9 INJECTION INTRAVENOUS at 15:11:00

## 2021-09-09 RX ADMIN — BUPIVACAINE HYDROCHLORIDE 30 ML: 5 INJECTION, SOLUTION EPIDURAL; INTRACAUDAL at 15:09:00

## 2021-09-09 RX ADMIN — HEPARIN SODIUM 40000 UNITS: 10000 INJECTION, SOLUTION INTRAVENOUS; SUBCUTANEOUS at 15:09:00

## 2021-09-09 NOTE — PROCEDURES
S:  Patient here for instill given hx of bladder pain.   Patient reports feeling bladder pain and frequency  Past instills have been helpful  Denies gross hematuria  Denies  s/sx of UTI  Bowels BOWEL STATUS: constipated   Using fiber and miralax      O:  VS

## 2021-09-28 ENCOUNTER — OFFICE VISIT (OUTPATIENT)
Dept: UROLOGY | Facility: HOSPITAL | Age: 61
End: 2021-09-28
Attending: OBSTETRICS & GYNECOLOGY
Payer: COMMERCIAL

## 2021-09-28 VITALS — TEMPERATURE: 98 F | BODY MASS INDEX: 28.35 KG/M2 | WEIGHT: 198 LBS | HEIGHT: 70 IN

## 2021-09-28 DIAGNOSIS — N30.10 CHRONIC INTERSTITIAL CYSTITIS: Primary | ICD-10-CM

## 2021-09-28 DIAGNOSIS — R35.1 NOCTURIA: ICD-10-CM

## 2021-09-28 DIAGNOSIS — N95.2 POSTMENOPAUSAL ATROPHIC VAGINITIS: ICD-10-CM

## 2021-09-28 DIAGNOSIS — K59.00 CONSTIPATION: ICD-10-CM

## 2021-09-28 PROCEDURE — 81002 URINALYSIS NONAUTO W/O SCOPE: CPT | Performed by: OBSTETRICS & GYNECOLOGY

## 2021-09-28 PROCEDURE — 51700 IRRIGATION OF BLADDER: CPT | Performed by: OBSTETRICS & GYNECOLOGY

## 2021-09-28 PROCEDURE — 99212 OFFICE O/P EST SF 10 MIN: CPT

## 2021-09-28 RX ORDER — CHOLECALCIFEROL (VITAMIN D3) 1250 MCG
CAPSULE ORAL
COMMUNITY

## 2021-09-28 RX ORDER — BUPIVACAINE HYDROCHLORIDE 5 MG/ML
30 INJECTION, SOLUTION EPIDURAL; INTRACAUDAL ONCE
Status: COMPLETED | OUTPATIENT
Start: 2021-09-28 | End: 2021-09-28

## 2021-09-28 RX ORDER — LIDOCAINE HYDROCHLORIDE 20 MG/ML
10 JELLY TOPICAL ONCE
Status: COMPLETED | OUTPATIENT
Start: 2021-09-28 | End: 2021-09-28

## 2021-09-28 RX ORDER — HEPARIN SODIUM 10000 [USP'U]/ML
40000 INJECTION, SOLUTION INTRAVENOUS; SUBCUTANEOUS ONCE
Status: COMPLETED | OUTPATIENT
Start: 2021-09-28 | End: 2021-09-28

## 2021-09-28 RX ORDER — SODIUM CHLORIDE 9 MG/ML
17 INJECTION INTRAVENOUS ONCE
Status: COMPLETED | OUTPATIENT
Start: 2021-09-28 | End: 2021-09-28

## 2021-09-28 RX ADMIN — LIDOCAINE HYDROCHLORIDE 10 ML: 20 JELLY TOPICAL at 10:36:00

## 2021-09-28 RX ADMIN — HEPARIN SODIUM 40000 UNITS: 10000 INJECTION, SOLUTION INTRAVENOUS; SUBCUTANEOUS at 10:36:00

## 2021-09-28 RX ADMIN — SODIUM CHLORIDE 17 ML: 9 INJECTION INTRAVENOUS at 10:37:00

## 2021-09-28 RX ADMIN — BUPIVACAINE HYDROCHLORIDE 30 ML: 5 INJECTION, SOLUTION EPIDURAL; INTRACAUDAL at 10:35:00

## 2021-09-28 NOTE — PROGRESS NOTES
Patient presents to follow up pelvic floor complaints    She is currently using vag valium prn, instills prn  Vag estrogen twice weekly    Sx historically helped with instills, rosa when bowels better  Not keen on CISC risk with botox  Has had hydrodistenti IC/PBS  Bowel routine/constipation regimen    Diagnostic Items:  ucx with s/sx of UTI    Medications Discussed:  Estrace Cream  Fiber  Miralax    Treatment Plan, Non-surgical:   RN teaching/pt education done  Fiber supplement  Stimulant:  MOM, Miralax  Est

## 2021-09-28 NOTE — PROCEDURES
S:  Patient here for instill given hx of bladder pain.   Patient reports feeling a flare  Past instills have been helpful  Denies gross hematuria  Denies  s/sx of UTI  Bowels BOWEL STATUS: constipated   Also using vag valium and AZO for bladder pain    O:

## 2021-10-29 ENCOUNTER — HOSPITAL ENCOUNTER (OUTPATIENT)
Age: 61
Discharge: HOME | End: 2021-10-29
Payer: COMMERCIAL

## 2021-10-29 VITALS
OXYGEN SATURATION: 96 % | DIASTOLIC BLOOD PRESSURE: 82 MMHG | HEIGHT: 67 IN | WEIGHT: 189 LBS | RESPIRATION RATE: 20 BRPM | HEART RATE: 101 BPM | SYSTOLIC BLOOD PRESSURE: 124 MMHG | BODY MASS INDEX: 29.66 KG/M2 | TEMPERATURE: 98 F

## 2021-10-29 DIAGNOSIS — L03.011 PARONYCHIA OF FINGER OF RIGHT HAND: Primary | ICD-10-CM

## 2021-10-29 PROCEDURE — 10060 I&D ABSCESS SIMPLE/SINGLE: CPT | Performed by: NURSE PRACTITIONER

## 2021-10-29 PROCEDURE — 99213 OFFICE O/P EST LOW 20 MIN: CPT | Performed by: NURSE PRACTITIONER

## 2021-10-29 RX ORDER — SULFAMETHOXAZOLE AND TRIMETHOPRIM 800; 160 MG/1; MG/1
1 TABLET ORAL 2 TIMES DAILY
Qty: 14 TABLET | Refills: 0 | Status: SHIPPED | OUTPATIENT
Start: 2021-10-29 | End: 2021-11-01

## 2021-10-29 NOTE — ED PROVIDER NOTES
Patient Seen in: [facility]   CC: thumb infection  HPI: Benji Johnson 62year old female  who presents c/o right thumb infection which has been present x3 wks.  States she initially had acrylic nails and had the acrylics removed and rip Positive) Other         Father's mother's sibling's child and that child's two children   • Other (Blood clots) Other         Several people on mother's side   • Cancer Self 47        THYROID   • Depression Neg    • Bipolar Disorder Neg    • Schizophrenia needed for Nausea. QUEtiapine Fumarate 100 MG Oral Tab,  Take 100 mg by mouth. Estradiol (ESTRACE) 0.1 MG/GM Vaginal Cream,  Apply 0.5 gram vaginally 2 times per week.    Estradiol 0.075 MG/24HR Transdermal Patch Biweekly,  Place 1 patch onto the skin t the wound. The patient tolerated the procedure well. The procedure was performed by myself. Advised on warm soaks/compresses, antibiotic instructions, probiotic instructions, follow-up as well as return/ED precautions reviewed.   Patient demonstrates und

## 2021-11-01 ENCOUNTER — OFFICE VISIT (OUTPATIENT)
Dept: SURGERY | Facility: CLINIC | Age: 61
End: 2021-11-01
Payer: COMMERCIAL

## 2021-11-01 DIAGNOSIS — L03.011 ACUTE PARONYCHIA OF FINGER OF RIGHT HAND: Primary | ICD-10-CM

## 2021-11-01 PROCEDURE — 99203 OFFICE O/P NEW LOW 30 MIN: CPT | Performed by: PLASTIC SURGERY

## 2021-11-01 NOTE — H&P
Sujatha Ashford is a 62year old female that presents with Patient presents with: Injection: RTH  .     REFERRED BY:  Suzanna Bardales      Pacemaker: No  Latex Allergy: no  Coumadin: No  Plavix: No  Other anticoagulants: No  Cardiac stents: No    HAND [facility] hysterectomy    • OTHER      foot surgeries x4-no hardware   • OTHER SURGICAL HISTORY  2004    Anterior cervical fusion    • OTHER SURGICAL HISTORY Left     Achilles surgery x 2   • REDUCTION LEFT  2013   • REDUCTION OF LARGE BREAST     • REDUCTION RIGHT Transdermal Patch Biweekly Place 1 patch onto the skin twice a week. • Buprenorphine HCl-Naloxone HCl 8-2 MG Sublingual SL Tab Place 1 tablet under the tongue daily as needed.  Indications: Interstitial cystitis          SOCIAL HISTORY  Social History thickening of the skin        ASSESSMENT/PLAN:       Resolving paronychia right thumb    Nothing need be done for this currently  She may be developing a wart. If this does not resolve in 2 weeks, she should see dermatology for cryotherapy    Discharged.

## 2022-01-21 ENCOUNTER — OFFICE VISIT (OUTPATIENT)
Dept: UROLOGY | Facility: HOSPITAL | Age: 62
End: 2022-01-21
Attending: OBSTETRICS & GYNECOLOGY
Payer: COMMERCIAL

## 2022-01-21 VITALS — TEMPERATURE: 97 F | RESPIRATION RATE: 16 BRPM | WEIGHT: 189 LBS | BODY MASS INDEX: 30 KG/M2

## 2022-01-21 DIAGNOSIS — N30.10 CHRONIC INTERSTITIAL CYSTITIS: Primary | ICD-10-CM

## 2022-01-21 PROCEDURE — 51700 IRRIGATION OF BLADDER: CPT

## 2022-01-21 PROCEDURE — 81002 URINALYSIS NONAUTO W/O SCOPE: CPT

## 2022-01-21 RX ORDER — BUPIVACAINE HYDROCHLORIDE 5 MG/ML
30 INJECTION, SOLUTION EPIDURAL; INTRACAUDAL ONCE
Status: COMPLETED | OUTPATIENT
Start: 2022-01-21 | End: 2022-01-21

## 2022-01-21 RX ORDER — HEPARIN SODIUM 10000 [USP'U]/ML
40000 INJECTION, SOLUTION INTRAVENOUS; SUBCUTANEOUS ONCE
Status: COMPLETED | OUTPATIENT
Start: 2022-01-21 | End: 2022-01-21

## 2022-01-21 RX ORDER — SODIUM CHLORIDE 9 MG/ML
17 INJECTION INTRAVENOUS ONCE
Status: COMPLETED | OUTPATIENT
Start: 2022-01-21 | End: 2022-01-21

## 2022-01-21 RX ORDER — LIDOCAINE HYDROCHLORIDE 20 MG/ML
10 JELLY TOPICAL ONCE
Status: COMPLETED | OUTPATIENT
Start: 2022-01-21 | End: 2022-01-21

## 2022-01-21 RX ADMIN — SODIUM CHLORIDE 17 ML: 9 INJECTION INTRAVENOUS at 14:46:00

## 2022-01-21 RX ADMIN — LIDOCAINE HYDROCHLORIDE 10 ML: 20 JELLY TOPICAL at 14:46:00

## 2022-01-21 RX ADMIN — BUPIVACAINE HYDROCHLORIDE 30 ML: 5 INJECTION, SOLUTION EPIDURAL; INTRACAUDAL at 14:45:00

## 2022-01-21 RX ADMIN — HEPARIN SODIUM 40000 UNITS: 10000 INJECTION, SOLUTION INTRAVENOUS; SUBCUTANEOUS at 14:46:00

## 2022-01-21 NOTE — PROCEDURES
S:  Patient here for instill # rescue given hx of bladder pain.   Patient reports feeling bladder pain - does NOT feel like she has a UTI  Past instills have been helpful  Denies gross hematuria  Denies  s/sx of UTI  Bowels BOWEL STATUS: constipated  - Usin

## 2022-02-10 ENCOUNTER — OFFICE VISIT (OUTPATIENT)
Dept: UROLOGY | Facility: CLINIC | Age: 62
End: 2022-02-10
Attending: OBSTETRICS & GYNECOLOGY
Payer: COMMERCIAL

## 2022-02-10 VITALS — WEIGHT: 189 LBS | BODY MASS INDEX: 29.66 KG/M2 | TEMPERATURE: 98 F | HEIGHT: 67 IN

## 2022-02-10 DIAGNOSIS — N30.10 CHRONIC INTERSTITIAL CYSTITIS: Primary | ICD-10-CM

## 2022-02-10 PROCEDURE — 81002 URINALYSIS NONAUTO W/O SCOPE: CPT

## 2022-02-10 PROCEDURE — 51700 IRRIGATION OF BLADDER: CPT

## 2022-02-10 RX ORDER — SODIUM CHLORIDE 9 MG/ML
17 INJECTION INTRAVENOUS ONCE
Status: COMPLETED | OUTPATIENT
Start: 2022-02-10 | End: 2022-02-10

## 2022-02-10 RX ORDER — BUPIVACAINE HYDROCHLORIDE 5 MG/ML
30 INJECTION, SOLUTION EPIDURAL; INTRACAUDAL ONCE
Status: COMPLETED | OUTPATIENT
Start: 2022-02-10 | End: 2022-02-10

## 2022-02-10 RX ORDER — HEPARIN SODIUM 10000 [USP'U]/ML
40000 INJECTION, SOLUTION INTRAVENOUS; SUBCUTANEOUS ONCE
Status: COMPLETED | OUTPATIENT
Start: 2022-02-10 | End: 2022-02-10

## 2022-02-10 RX ORDER — LIDOCAINE HYDROCHLORIDE 20 MG/ML
10 JELLY TOPICAL ONCE
Status: COMPLETED | OUTPATIENT
Start: 2022-02-10 | End: 2022-02-10

## 2022-02-10 RX ADMIN — SODIUM CHLORIDE 17 ML: 9 INJECTION INTRAVENOUS at 14:21:00

## 2022-02-10 RX ADMIN — LIDOCAINE HYDROCHLORIDE 10 ML: 20 JELLY TOPICAL at 14:21:00

## 2022-02-10 RX ADMIN — HEPARIN SODIUM 40000 UNITS: 10000 INJECTION, SOLUTION INTRAVENOUS; SUBCUTANEOUS at 14:20:00

## 2022-02-10 RX ADMIN — BUPIVACAINE HYDROCHLORIDE 30 ML: 5 INJECTION, SOLUTION EPIDURAL; INTRACAUDAL at 14:20:00

## 2022-03-24 PROBLEM — F33.1 MODERATE EPISODE OF RECURRENT MAJOR DEPRESSIVE DISORDER (HCC): Status: ACTIVE | Noted: 2022-03-24

## 2022-04-15 ENCOUNTER — TELEPHONE (OUTPATIENT)
Dept: UROLOGY | Facility: CLINIC | Age: 62
End: 2022-04-15

## 2022-04-15 ENCOUNTER — OFFICE VISIT (OUTPATIENT)
Dept: UROLOGY | Facility: CLINIC | Age: 62
End: 2022-04-15
Attending: OBSTETRICS & GYNECOLOGY
Payer: COMMERCIAL

## 2022-04-15 VITALS — HEIGHT: 65.95 IN | BODY MASS INDEX: 31.5 KG/M2 | TEMPERATURE: 98 F | WEIGHT: 196 LBS

## 2022-04-15 DIAGNOSIS — N30.10 CHRONIC INTERSTITIAL CYSTITIS: Primary | ICD-10-CM

## 2022-04-15 PROCEDURE — 87086 URINE CULTURE/COLONY COUNT: CPT

## 2022-04-15 PROCEDURE — 51700 IRRIGATION OF BLADDER: CPT

## 2022-04-15 PROCEDURE — 81002 URINALYSIS NONAUTO W/O SCOPE: CPT

## 2022-04-15 RX ORDER — NITROFURANTOIN 25; 75 MG/1; MG/1
100 CAPSULE ORAL 2 TIMES DAILY
Qty: 14 CAPSULE | Refills: 0 | Status: SHIPPED | OUTPATIENT
Start: 2022-04-15

## 2022-04-15 RX ORDER — HEPARIN SODIUM 10000 [USP'U]/ML
40000 INJECTION, SOLUTION INTRAVENOUS; SUBCUTANEOUS ONCE
Status: DISCONTINUED | OUTPATIENT
Start: 2022-04-15 | End: 2022-04-15

## 2022-04-15 RX ORDER — HEPARIN SODIUM 5000 [USP'U]/ML
40000 INJECTION, SOLUTION INTRAVENOUS; SUBCUTANEOUS ONCE
Status: COMPLETED | OUTPATIENT
Start: 2022-04-15 | End: 2022-04-15

## 2022-04-15 RX ORDER — SODIUM CHLORIDE 9 MG/ML
17 INJECTION INTRAVENOUS ONCE
Status: COMPLETED | OUTPATIENT
Start: 2022-04-15 | End: 2022-04-15

## 2022-04-15 RX ORDER — BUPIVACAINE HYDROCHLORIDE 5 MG/ML
30 INJECTION, SOLUTION EPIDURAL; INTRACAUDAL ONCE
Status: COMPLETED | OUTPATIENT
Start: 2022-04-15 | End: 2022-04-15

## 2022-04-15 RX ORDER — LIDOCAINE HYDROCHLORIDE 20 MG/ML
10 JELLY TOPICAL ONCE
Status: COMPLETED | OUTPATIENT
Start: 2022-04-15 | End: 2022-04-15

## 2022-04-15 RX ADMIN — BUPIVACAINE HYDROCHLORIDE 30 ML: 5 INJECTION, SOLUTION EPIDURAL; INTRACAUDAL at 12:27:00

## 2022-04-15 RX ADMIN — LIDOCAINE HYDROCHLORIDE 10 ML: 20 JELLY TOPICAL at 12:29:00

## 2022-04-15 RX ADMIN — SODIUM CHLORIDE 17 ML: 9 INJECTION INTRAVENOUS at 12:27:00

## 2022-04-15 RX ADMIN — HEPARIN SODIUM 40000 UNITS: 5000 INJECTION, SOLUTION INTRAVENOUS; SUBCUTANEOUS at 12:30:00

## 2022-04-15 NOTE — TELEPHONE ENCOUNTER
Pt calling w/ UTI sx. Not sure, but would like to test. UCX ordered. Pt can go to any [facility] lab. Declines abx right now. Pt wanting to come in [facility] for an instill. Pt will call if she wants instill.

## 2022-04-18 ENCOUNTER — TELEPHONE (OUTPATIENT)
Dept: UROLOGY | Facility: CLINIC | Age: 62
End: 2022-04-18

## 2022-04-18 NOTE — TELEPHONE ENCOUNTER
.Phoned patient, [facility] and informed of normal urine culture results.  Patient to call with questions/concerns

## 2022-06-21 ENCOUNTER — HOSPITAL ENCOUNTER (OUTPATIENT)
Dept: MAMMOGRAPHY | Facility: HOSPITAL | Age: 62
Discharge: HOME | End: 2022-06-21
Attending: OBSTETRICS & GYNECOLOGY
Payer: COMMERCIAL

## 2022-06-21 DIAGNOSIS — Z12.31 ENCOUNTER FOR SCREENING MAMMOGRAM FOR MALIGNANT NEOPLASM OF BREAST: ICD-10-CM

## 2022-06-21 DIAGNOSIS — N60.01 BILATERAL BREAST CYSTS: ICD-10-CM

## 2022-06-21 DIAGNOSIS — N60.02 BILATERAL BREAST CYSTS: ICD-10-CM

## 2022-06-21 PROCEDURE — 76642 ULTRASOUND BREAST LIMITED: CPT | Performed by: OBSTETRICS & GYNECOLOGY

## 2022-06-21 PROCEDURE — 77066 DX MAMMO INCL CAD BI: CPT | Performed by: OBSTETRICS & GYNECOLOGY

## 2022-06-21 PROCEDURE — 77062 BREAST TOMOSYNTHESIS BI: CPT | Performed by: OBSTETRICS & GYNECOLOGY

## 2022-12-05 ENCOUNTER — TELEPHONE (OUTPATIENT)
Dept: UROLOGY | Facility: CLINIC | Age: 62
End: 2022-12-05

## 2023-04-25 ENCOUNTER — OFFICE VISIT (OUTPATIENT)
Dept: UROLOGY | Facility: CLINIC | Age: 63
End: 2023-04-25
Attending: OBSTETRICS & GYNECOLOGY
Payer: COMMERCIAL

## 2023-04-25 DIAGNOSIS — R39.89 BLADDER PAIN: ICD-10-CM

## 2023-04-25 DIAGNOSIS — N30.10 CHRONIC INTERSTITIAL CYSTITIS: Primary | ICD-10-CM

## 2023-04-25 DIAGNOSIS — R30.0 DYSURIA: ICD-10-CM

## 2023-04-25 DIAGNOSIS — N95.2 POSTMENOPAUSAL ATROPHIC VAGINITIS: ICD-10-CM

## 2023-04-25 DIAGNOSIS — N81.84 PELVIC MUSCLE WASTING: ICD-10-CM

## 2023-04-25 PROCEDURE — 81002 URINALYSIS NONAUTO W/O SCOPE: CPT | Performed by: PHYSICIAN ASSISTANT

## 2023-04-25 PROCEDURE — 87086 URINE CULTURE/COLONY COUNT: CPT | Performed by: PHYSICIAN ASSISTANT

## 2023-04-25 PROCEDURE — 51700 IRRIGATION OF BLADDER: CPT | Performed by: PHYSICIAN ASSISTANT

## 2023-04-25 PROCEDURE — 51701 INSERT BLADDER CATHETER: CPT

## 2023-04-25 PROCEDURE — 99212 OFFICE O/P EST SF 10 MIN: CPT

## 2023-04-25 RX ORDER — ESTRADIOL 0.1 MG/G
CREAM VAGINAL
Qty: 42.5 G | Refills: 3 | Status: SHIPPED | OUTPATIENT
Start: 2023-04-25

## 2023-04-25 RX ORDER — SODIUM CHLORIDE 9 MG/ML
17 INJECTION INTRAVENOUS ONCE
Status: COMPLETED | OUTPATIENT
Start: 2023-04-25 | End: 2023-04-25

## 2023-04-25 RX ORDER — DULAGLUTIDE 0.75 MG/.5ML
INJECTION, SOLUTION SUBCUTANEOUS
COMMUNITY
Start: 2023-04-05

## 2023-04-25 RX ORDER — LIDOCAINE HYDROCHLORIDE 20 MG/ML
10 JELLY TOPICAL ONCE
Status: COMPLETED | OUTPATIENT
Start: 2023-04-25 | End: 2023-04-25

## 2023-04-25 RX ORDER — ESTRADIOL 0.1 MG/G
CREAM VAGINAL
Qty: 42.5 G | Refills: 3 | Status: SHIPPED | OUTPATIENT
Start: 2023-04-25 | End: 2023-04-25

## 2023-04-25 RX ORDER — BUPIVACAINE HYDROCHLORIDE 5 MG/ML
30 INJECTION, SOLUTION EPIDURAL; INTRACAUDAL ONCE
Status: COMPLETED | OUTPATIENT
Start: 2023-04-25 | End: 2023-04-25

## 2023-04-25 RX ORDER — HEPARIN SODIUM 10000 [USP'U]/ML
40000 INJECTION, SOLUTION INTRAVENOUS; SUBCUTANEOUS ONCE
Status: COMPLETED | OUTPATIENT
Start: 2023-04-25 | End: 2023-04-25

## 2023-04-25 RX ADMIN — BUPIVACAINE HYDROCHLORIDE 30 ML: 5 INJECTION, SOLUTION EPIDURAL; INTRACAUDAL at 15:30:00

## 2023-04-25 RX ADMIN — HEPARIN SODIUM 40000 UNITS: 10000 INJECTION, SOLUTION INTRAVENOUS; SUBCUTANEOUS at 15:30:00

## 2023-04-25 RX ADMIN — SODIUM CHLORIDE 17 ML: 9 INJECTION INTRAVENOUS at 15:30:00

## 2023-04-25 RX ADMIN — LIDOCAINE HYDROCHLORIDE 10 ML: 20 JELLY TOPICAL at 15:45:00

## 2023-04-25 NOTE — PROCEDURES
S:  Patient here for instill #1 given hx of bladder pain. Patient reports feeling horrible for last 1-2 months in flare  Past instills have been helpful  Denies gross hematuria  Denies  s/sx of UTI  Bowels BOWEL STATUS: regular every other day with herbal laxative  Using suboxone and cbd vaginal suppositories for pain  Also using Azo for bladder pain    O:  VS There were no vitals filed for this visit. Gen: NAD  :   Ext. Gen: no atrophy, no lesions  Urethra: no caruncle, no atrophy  Vaginal discharge-no  Perineum: intact, + tender  Instill given per clinic protocol. Patient catheterized for a residual of 35ml. A/P:  Dx Chronic interstitial cystitis  (primary encounter diagnosis)  Postmenopausal atrophic vaginitis  Pelvic muscle wasting  Dysuria  Bladder pain  Chemstrip performed. Continue estradiol cream  Reviewed bowel regimen  Reviewed instill instructions  Patient tolerated procedure well. Will need f/u w/ Dr. Cai Ends in  may. Next instill scheduled for weekly.

## 2023-04-25 NOTE — PROGRESS NOTES
[facility] called to reorder vaginal valium 10mg. VML with details for reorder and provided information with SOPHY #  Surjit Lou [id number] PA insert 1 suppository vaginally every day for 30 days the, three times a week there after.  #45, refill x 3

## 2023-05-05 ENCOUNTER — TELEPHONE (OUTPATIENT)
Dept: UROLOGY | Facility: CLINIC | Age: 63
End: 2023-05-05

## 2023-05-05 NOTE — TELEPHONE ENCOUNTER
TC from pt yesterday saying [facility] sent her vaginal valium suppositories to a different address. It was called in w/ address [address] Pt lives at [address] Pt tried tracking the package and it was delivered, but pt did not receive it. Called IC, spoke to Malcolm, the pharmacist who said she spent her time calling [employer] for pt trying to track package down. IC will send pt out another package at their own cost.   Phoned pt back and LM on her VM informing her of this.

## 2023-05-08 ENCOUNTER — OFFICE VISIT (OUTPATIENT)
Dept: UROLOGY | Facility: CLINIC | Age: 63
End: 2023-05-08
Attending: OBSTETRICS & GYNECOLOGY
Payer: COMMERCIAL

## 2023-05-08 VITALS — BODY MASS INDEX: 31.5 KG/M2 | HEIGHT: 66 IN | WEIGHT: 196 LBS

## 2023-05-08 DIAGNOSIS — N30.10 CHRONIC INTERSTITIAL CYSTITIS: Primary | ICD-10-CM

## 2023-05-08 PROCEDURE — 51700 IRRIGATION OF BLADDER: CPT

## 2023-05-08 PROCEDURE — 81002 URINALYSIS NONAUTO W/O SCOPE: CPT

## 2023-05-08 RX ORDER — SODIUM CHLORIDE 9 MG/ML
17 INJECTION INTRAVENOUS ONCE
Status: COMPLETED | OUTPATIENT
Start: 2023-05-08 | End: 2023-05-08

## 2023-05-08 RX ORDER — HEPARIN SODIUM 10000 [USP'U]/ML
40000 INJECTION, SOLUTION INTRAVENOUS; SUBCUTANEOUS ONCE
Status: COMPLETED | OUTPATIENT
Start: 2023-05-08 | End: 2023-05-08

## 2023-05-08 RX ORDER — BUPIVACAINE HYDROCHLORIDE 5 MG/ML
30 INJECTION, SOLUTION EPIDURAL; INTRACAUDAL ONCE
Status: COMPLETED | OUTPATIENT
Start: 2023-05-08 | End: 2023-05-08

## 2023-05-08 RX ORDER — SODIUM CHLORIDE 9 MG/ML
17 INJECTION INTRAVENOUS ONCE
Status: DISCONTINUED | OUTPATIENT
Start: 2023-05-08 | End: 2023-05-08

## 2023-05-08 RX ORDER — LIDOCAINE HYDROCHLORIDE 20 MG/ML
10 JELLY TOPICAL ONCE
Status: DISCONTINUED | OUTPATIENT
Start: 2023-05-08 | End: 2023-05-08

## 2023-05-08 RX ORDER — LIDOCAINE HYDROCHLORIDE 20 MG/ML
10 JELLY TOPICAL ONCE
Status: COMPLETED | OUTPATIENT
Start: 2023-05-08 | End: 2023-05-08

## 2023-05-08 RX ORDER — BUPIVACAINE HYDROCHLORIDE 5 MG/ML
30 INJECTION, SOLUTION EPIDURAL; INTRACAUDAL ONCE
Status: DISCONTINUED | OUTPATIENT
Start: 2023-05-08 | End: 2023-05-08

## 2023-05-08 RX ORDER — HEPARIN SODIUM 10000 [USP'U]/ML
40000 INJECTION, SOLUTION INTRAVENOUS; SUBCUTANEOUS ONCE
Status: DISCONTINUED | OUTPATIENT
Start: 2023-05-08 | End: 2023-05-08

## 2023-05-08 RX ADMIN — HEPARIN SODIUM 40000 UNITS: 10000 INJECTION, SOLUTION INTRAVENOUS; SUBCUTANEOUS at 13:20:00

## 2023-05-08 RX ADMIN — BUPIVACAINE HYDROCHLORIDE 30 ML: 5 INJECTION, SOLUTION EPIDURAL; INTRACAUDAL at 13:20:00

## 2023-05-08 RX ADMIN — SODIUM CHLORIDE 17 ML: 9 INJECTION INTRAVENOUS at 13:20:00

## 2023-05-08 RX ADMIN — LIDOCAINE HYDROCHLORIDE 10 ML: 20 JELLY TOPICAL at 13:15:00

## 2023-05-08 NOTE — PROCEDURES
S:  Patient here for instill #2 given hx of bladder pain. Patient reports feeling slight improvement   Past instills have been helpful  Denies gross hematuria  Denies  s/sx of UTI  Bowels BOWEL STATUS: regular   Using Estrace  Also using Vaginal Valium and AZO PRN for bladder pain    O:  VS    05/08/23  1400   Weight: 196 lb   Height: 66\"     Gen: NAD  :   Ext. Gen: + atrophy, no lesions  Urethra:  + atrophy  Vaginal discharge-none  Perineum: intact, non tender  Instill given per clinic protocol. Patient catheterized for a residual of 60ml. A/P:  Dx Chronic interstitial cystitis  (primary encounter diagnosis)  Chemstrip performed. Continue estradiol cream  Reviewed bowel regimen  Reviewed instill instructions  Patient tolerated procedure well. Will need f/u w/ Dr. Noe Rothman on 5/23/23. Next instill scheduled for 1 week.

## 2023-05-23 ENCOUNTER — OFFICE VISIT (OUTPATIENT)
Dept: UROLOGY | Facility: CLINIC | Age: 63
End: 2023-05-23
Attending: OBSTETRICS & GYNECOLOGY
Payer: COMMERCIAL

## 2023-05-23 VITALS — WEIGHT: 196 LBS | BODY MASS INDEX: 31.5 KG/M2 | HEIGHT: 66 IN

## 2023-05-23 VITALS — BODY MASS INDEX: 31.5 KG/M2 | HEIGHT: 66 IN | WEIGHT: 196 LBS

## 2023-05-23 DIAGNOSIS — N95.2 POSTMENOPAUSAL ATROPHIC VAGINITIS: ICD-10-CM

## 2023-05-23 DIAGNOSIS — N81.84 PELVIC MUSCLE WASTING: ICD-10-CM

## 2023-05-23 DIAGNOSIS — N30.10 CHRONIC INTERSTITIAL CYSTITIS: Primary | ICD-10-CM

## 2023-05-23 PROCEDURE — 81002 URINALYSIS NONAUTO W/O SCOPE: CPT

## 2023-05-23 PROCEDURE — 51700 IRRIGATION OF BLADDER: CPT

## 2023-05-23 RX ORDER — SODIUM CHLORIDE 9 MG/ML
17 INJECTION INTRAVENOUS ONCE
Status: COMPLETED | OUTPATIENT
Start: 2023-05-23 | End: 2023-05-23

## 2023-05-23 RX ORDER — BUPIVACAINE HYDROCHLORIDE 5 MG/ML
30 INJECTION, SOLUTION EPIDURAL; INTRACAUDAL ONCE
Status: COMPLETED | OUTPATIENT
Start: 2023-05-23 | End: 2023-05-23

## 2023-05-23 RX ORDER — HEPARIN SODIUM 10000 [USP'U]/ML
40000 INJECTION, SOLUTION INTRAVENOUS; SUBCUTANEOUS ONCE
Status: COMPLETED | OUTPATIENT
Start: 2023-05-23 | End: 2023-05-23

## 2023-05-23 RX ORDER — LIDOCAINE HYDROCHLORIDE 20 MG/ML
10 JELLY TOPICAL ONCE
Status: COMPLETED | OUTPATIENT
Start: 2023-05-23 | End: 2023-05-23

## 2023-05-23 RX ORDER — MELOXICAM 10 MG/1
CAPSULE ORAL
COMMUNITY

## 2023-05-23 RX ADMIN — LIDOCAINE HYDROCHLORIDE 10 ML: 20 JELLY TOPICAL at 14:25:00

## 2023-05-23 RX ADMIN — BUPIVACAINE HYDROCHLORIDE 30 ML: 5 INJECTION, SOLUTION EPIDURAL; INTRACAUDAL at 14:30:00

## 2023-05-23 RX ADMIN — SODIUM CHLORIDE 17 ML: 9 INJECTION INTRAVENOUS at 14:30:00

## 2023-05-23 RX ADMIN — HEPARIN SODIUM 40000 UNITS: 10000 INJECTION, SOLUTION INTRAVENOUS; SUBCUTANEOUS at 14:30:00

## 2023-05-23 NOTE — PROCEDURES
S:  Patient here for instill #3 given hx of bladder pain. Patient reports feeling some improvement  Past instills have been helpful  Denies gross hematuria  Denies  s/sx of UTI  Bowels BOWEL STATUS: constipated  Also using vaginal valium for bladder pain    O:  VS    05/23/23  1459   Weight: 196 lb   Height: 66\"     Gen: NAD  :   Ext. Gen: + atrophy, no lesions  Urethra:  + atrophy  Vaginal discharge-none  Perineum: intact, non tender  Instill given per clinic protocol. Patient catheterized for a residual of 40ml. A/P:  Dx Chronic interstitial cystitis  (primary encounter diagnosis)  Chemstrip performed. Continue estradiol cream  Reviewed bowel regimen  Reviewed instill instructions  Patient tolerated procedure well. Will need f/u w/ Dr. Kathleen Mcguire today. Next instill scheduled for 1 week.

## 2023-05-30 ENCOUNTER — HOSPITAL ENCOUNTER (OUTPATIENT)
Dept: MAMMOGRAPHY | Age: 63
Discharge: HOME | End: 2023-05-30
Attending: OBSTETRICS & GYNECOLOGY
Payer: COMMERCIAL

## 2023-05-30 ENCOUNTER — HOSPITAL ENCOUNTER (OUTPATIENT)
Dept: BONE DENSITY | Age: 63
Discharge: HOME | End: 2023-05-30
Attending: OBSTETRICS & GYNECOLOGY
Payer: COMMERCIAL

## 2023-05-30 PROCEDURE — 77080 DXA BONE DENSITY AXIAL: CPT | Performed by: OBSTETRICS & GYNECOLOGY

## 2023-05-30 PROCEDURE — 77067 SCR MAMMO BI INCL CAD: CPT | Performed by: OBSTETRICS & GYNECOLOGY

## 2023-05-30 PROCEDURE — 77063 BREAST TOMOSYNTHESIS BI: CPT | Performed by: OBSTETRICS & GYNECOLOGY

## 2023-06-08 ENCOUNTER — TELEPHONE (OUTPATIENT)
Dept: UROLOGY | Facility: CLINIC | Age: 63
End: 2023-06-08

## 2023-06-08 NOTE — TELEPHONE ENCOUNTER
Returned pt call about needing to cancel 6/8/23 appt due to illness. Pt stated in vm that she will reschedule when ready.

## 2023-07-10 ENCOUNTER — TELEPHONE (OUTPATIENT)
Dept: UROLOGY | Facility: CLINIC | Age: 63
End: 2023-07-10

## 2023-07-10 NOTE — TELEPHONE ENCOUNTER
TC from patient requesting sooner appt for instill since she is going OOT  LM to call  - offered this afternoon to come in

## 2023-07-24 ENCOUNTER — OFFICE VISIT (OUTPATIENT)
Dept: UROLOGY | Facility: CLINIC | Age: 63
End: 2023-07-24
Attending: OBSTETRICS & GYNECOLOGY
Payer: COMMERCIAL

## 2023-07-24 VITALS — HEIGHT: 66 IN | WEIGHT: 195 LBS | BODY MASS INDEX: 31.34 KG/M2

## 2023-07-24 DIAGNOSIS — N30.10 CHRONIC INTERSTITIAL CYSTITIS: Primary | ICD-10-CM

## 2023-07-24 PROCEDURE — 51700 IRRIGATION OF BLADDER: CPT

## 2023-07-24 PROCEDURE — 81002 URINALYSIS NONAUTO W/O SCOPE: CPT

## 2023-07-24 RX ORDER — BUPIVACAINE HYDROCHLORIDE 5 MG/ML
30 INJECTION, SOLUTION EPIDURAL; INTRACAUDAL ONCE
Status: COMPLETED | OUTPATIENT
Start: 2023-07-24 | End: 2023-07-24

## 2023-07-24 RX ORDER — SODIUM CHLORIDE 9 MG/ML
17 INJECTION INTRAVENOUS ONCE
Status: COMPLETED | OUTPATIENT
Start: 2023-07-24 | End: 2023-07-24

## 2023-07-24 RX ORDER — LIDOCAINE HYDROCHLORIDE 20 MG/ML
10 JELLY TOPICAL ONCE
Status: COMPLETED | OUTPATIENT
Start: 2023-07-24 | End: 2023-07-24

## 2023-07-24 RX ORDER — HEPARIN SODIUM 10000 [USP'U]/ML
40000 INJECTION, SOLUTION INTRAVENOUS; SUBCUTANEOUS ONCE
Status: COMPLETED | OUTPATIENT
Start: 2023-07-24 | End: 2023-07-24

## 2023-07-24 RX ADMIN — HEPARIN SODIUM 40000 UNITS: 10000 INJECTION, SOLUTION INTRAVENOUS; SUBCUTANEOUS at 14:20:00

## 2023-07-24 RX ADMIN — LIDOCAINE HYDROCHLORIDE 10 ML: 20 JELLY TOPICAL at 14:15:00

## 2023-07-24 RX ADMIN — SODIUM CHLORIDE 17 ML: 9 INJECTION INTRAVENOUS at 14:20:00

## 2023-07-24 RX ADMIN — BUPIVACAINE HYDROCHLORIDE 30 ML: 5 INJECTION, SOLUTION EPIDURAL; INTRACAUDAL at 14:20:00

## 2023-07-24 NOTE — PROCEDURES
S:  Patient here for instill PRN given hx of bladder pain. Patient reports feeling cramping   Past instills have been helpful  Denies gross hematuria  Denies  s/sx of UTI  Bowels BOWEL STATUS: constipated   Also using vaginal valium for bladder pain    O:  VS    07/24/23  1442   Weight: 195 lb   Height: 66\"     Gen: NAD  :   Ext. Gen: + atrophy, no lesions  Urethra:  + atrophy  Vaginal discharge-none  Perineum: intact, non tender  Instill given per clinic protocol. Patient catheterized for a residual of 10ml. A/P:  Dx Chronic interstitial cystitis  (primary encounter diagnosis)  Chemstrip performed. Continue estradiol cream  Reviewed bowel regimen  Reviewed instill instructions  Patient tolerated procedure well. Will need f/u w/ Dr. Gisell Costa in  2024. Next instill scheduled for PRN.

## 2023-08-11 ENCOUNTER — TELEPHONE (OUTPATIENT)
Dept: UROLOGY | Facility: CLINIC | Age: 63
End: 2023-08-11

## 2023-08-31 ENCOUNTER — HOSPITAL ENCOUNTER (OUTPATIENT)
Dept: MRI IMAGING | Facility: HOSPITAL | Age: 63
Discharge: HOME | End: 2023-08-31
Attending: OBSTETRICS & GYNECOLOGY
Payer: COMMERCIAL

## 2023-08-31 DIAGNOSIS — R92.2 DENSE BREAST TISSUE ON MAMMOGRAM: ICD-10-CM

## 2023-08-31 PROCEDURE — A9575 INJ GADOTERATE MEGLUMI 0.1ML: HCPCS | Performed by: OBSTETRICS & GYNECOLOGY

## 2023-08-31 PROCEDURE — 77049 MRI BREAST C-+ W/CAD BI: CPT | Performed by: OBSTETRICS & GYNECOLOGY

## 2023-08-31 RX ORDER — GADOTERATE MEGLUMINE 376.9 MG/ML
20 INJECTION INTRAVENOUS
Status: COMPLETED | OUTPATIENT
Start: 2023-08-31 | End: 2023-08-31

## 2023-08-31 RX ADMIN — GADOTERATE MEGLUMINE 18 ML: 376.9 INJECTION INTRAVENOUS at 19:25:00

## 2023-11-07 ENCOUNTER — TELEPHONE (OUTPATIENT)
Dept: UROLOGY | Facility: CLINIC | Age: 63
End: 2023-11-07

## 2023-11-07 NOTE — TELEPHONE ENCOUNTER
Returned patient's voicemail regarding 11/9 Instill appointment at 2pm, stating that she has new [employer]. LVM for patient to call us back to provide updated insurance member ID number for verification prior to appointment.

## 2023-11-09 ENCOUNTER — TELEPHONE (OUTPATIENT)
Dept: UROLOGY | Facility: CLINIC | Age: 63
End: 2023-11-09

## 2023-11-09 NOTE — TELEPHONE ENCOUNTER
PT left message to cancel 11/9/23 instill appt due to a death in the family. Pt said she will call later to reschedule.

## 2023-11-13 ENCOUNTER — TELEPHONE (OUTPATIENT)
Dept: UROLOGY | Facility: CLINIC | Age: 63
End: 2023-11-13

## 2023-11-13 NOTE — TELEPHONE ENCOUNTER
LVM - RETURNED PATIENTS CALL. SHE WANTS TO BE SEEN FOR AN INSTILL.   SS SAID WE CAN SCHEDULE HER FOR AN INSTILL ON NOVEMBER 16TH

## 2023-11-16 ENCOUNTER — OFFICE VISIT (OUTPATIENT)
Dept: UROLOGY | Facility: CLINIC | Age: 63
End: 2023-11-16
Attending: OBSTETRICS & GYNECOLOGY
Payer: COMMERCIAL

## 2023-11-16 VITALS — BODY MASS INDEX: 31.34 KG/M2 | RESPIRATION RATE: 20 BRPM | HEIGHT: 66 IN | WEIGHT: 195 LBS

## 2023-11-16 DIAGNOSIS — N30.10 CHRONIC INTERSTITIAL CYSTITIS: Primary | ICD-10-CM

## 2023-11-16 PROCEDURE — 51700 IRRIGATION OF BLADDER: CPT

## 2023-11-16 PROCEDURE — 81002 URINALYSIS NONAUTO W/O SCOPE: CPT

## 2023-11-16 RX ORDER — SODIUM CHLORIDE 9 MG/ML
17 INJECTION INTRAVENOUS ONCE
Status: COMPLETED | OUTPATIENT
Start: 2023-11-16 | End: 2023-11-16

## 2023-11-16 RX ORDER — BUPIVACAINE HYDROCHLORIDE 5 MG/ML
30 INJECTION, SOLUTION EPIDURAL; INTRACAUDAL ONCE
Status: COMPLETED | OUTPATIENT
Start: 2023-11-16 | End: 2023-11-16

## 2023-11-16 RX ORDER — LIDOCAINE HYDROCHLORIDE 20 MG/ML
10 JELLY TOPICAL ONCE
Status: COMPLETED | OUTPATIENT
Start: 2023-11-16 | End: 2023-11-16

## 2023-11-16 RX ORDER — HEPARIN SODIUM 10000 [USP'U]/ML
40000 INJECTION, SOLUTION INTRAVENOUS; SUBCUTANEOUS ONCE
Status: COMPLETED | OUTPATIENT
Start: 2023-11-16 | End: 2023-11-16

## 2023-11-16 RX ADMIN — LIDOCAINE HYDROCHLORIDE 10 ML: 20 JELLY TOPICAL at 15:10:00

## 2023-11-16 RX ADMIN — BUPIVACAINE HYDROCHLORIDE 30 ML: 5 INJECTION, SOLUTION EPIDURAL; INTRACAUDAL at 15:10:00

## 2023-11-16 RX ADMIN — HEPARIN SODIUM 40000 UNITS: 10000 INJECTION, SOLUTION INTRAVENOUS; SUBCUTANEOUS at 15:10:00

## 2023-11-16 RX ADMIN — SODIUM CHLORIDE 17 ML: 9 INJECTION INTRAVENOUS at 15:10:00

## 2023-11-16 NOTE — PROCEDURES
S:  Patient here for instill #1 given hx of bladder pain. Patient reports feeling bladder pain. Having increased stress. Past instills have been helpful  Denies gross hematuria  Denies  s/sx of UTI  Bowels BOWEL STATUS: regular   Also using Vaginal valium for bladder and pelvic pain  Brother passed away in Aug of esophageal CA.  O:  VS   Vitals:    11/16/23 1505   Resp: 20   Weight: 195 lb   Height: 66\"     Gen: NAD  :   Ext. Gen:   no lesions  Urethra: wnl  Vaginal discharge-wnl  Perineum: intact, non tender  Instill given per clinic protocol. Patient catheterized for a residual of 20ml. A/P:  Dx   Encounter Diagnosis   Name Primary? Chronic interstitial cystitis Yes     Chemstrip performed. Continue estradiol cream  Reviewed bowel regimen  Reviewed instill instructions  Patient tolerated procedure well. Will need f/u w/ Dr. Rosalinda Caballero in  May 2024. Next instill scheduled for 2 tweeks.

## 2023-11-29 ENCOUNTER — TELEPHONE (OUTPATIENT)
Dept: UROLOGY | Facility: CLINIC | Age: 63
End: 2023-11-29

## 2023-11-29 NOTE — TELEPHONE ENCOUNTER
Patient canceled 11/20/23 appt via [employer] due to illness. LVM confirming appt has been canceled. no

## 2023-12-14 ENCOUNTER — OFFICE VISIT (OUTPATIENT)
Dept: UROLOGY | Facility: CLINIC | Age: 63
End: 2023-12-14
Attending: OBSTETRICS & GYNECOLOGY
Payer: COMMERCIAL

## 2023-12-14 VITALS — RESPIRATION RATE: 20 BRPM | WEIGHT: 195 LBS | BODY MASS INDEX: 31.34 KG/M2 | HEIGHT: 66 IN

## 2023-12-14 DIAGNOSIS — N30.10 CHRONIC INTERSTITIAL CYSTITIS: Primary | ICD-10-CM

## 2023-12-14 PROCEDURE — 81002 URINALYSIS NONAUTO W/O SCOPE: CPT

## 2023-12-14 PROCEDURE — 51700 IRRIGATION OF BLADDER: CPT

## 2023-12-14 RX ORDER — HEPARIN SODIUM 10000 [USP'U]/ML
40000 INJECTION, SOLUTION INTRAVENOUS; SUBCUTANEOUS ONCE
Status: COMPLETED | OUTPATIENT
Start: 2023-12-14 | End: 2023-12-14

## 2023-12-14 RX ORDER — BUPIVACAINE HYDROCHLORIDE 5 MG/ML
30 INJECTION, SOLUTION EPIDURAL; INTRACAUDAL ONCE
Status: COMPLETED | OUTPATIENT
Start: 2023-12-14 | End: 2023-12-14

## 2023-12-14 RX ORDER — LIDOCAINE HYDROCHLORIDE 20 MG/ML
10 JELLY TOPICAL ONCE
Status: COMPLETED | OUTPATIENT
Start: 2023-12-14 | End: 2023-12-14

## 2023-12-14 RX ORDER — SODIUM CHLORIDE 9 MG/ML
17 INJECTION INTRAVENOUS ONCE
Status: COMPLETED | OUTPATIENT
Start: 2023-12-14 | End: 2023-12-14

## 2023-12-14 RX ADMIN — HEPARIN SODIUM 40000 UNITS: 10000 INJECTION, SOLUTION INTRAVENOUS; SUBCUTANEOUS at 15:27:00

## 2023-12-14 RX ADMIN — LIDOCAINE HYDROCHLORIDE 10 ML: 20 JELLY TOPICAL at 15:25:00

## 2023-12-14 RX ADMIN — BUPIVACAINE HYDROCHLORIDE 30 ML: 5 INJECTION, SOLUTION EPIDURAL; INTRACAUDAL at 15:27:00

## 2023-12-14 RX ADMIN — SODIUM CHLORIDE 17 ML: 9 INJECTION INTRAVENOUS at 15:27:00

## 2023-12-14 NOTE — PROCEDURES
S:  Patient here for instill #2 given hx of bladder pain. Patient reports feeling bladder pain  Past instills have been helpful  Denies gross hematuria  Denies  s/sx of UTI  Bowels BOWEL STATUS: regular   Also using vag valium for bladder pain    O:  VS   Vitals:    12/14/23 1527   Resp: 20   Weight: 195 lb   Height: 66\"     Gen: NAD  :   Ext. Gen: no lesions  Urethra: wnl  Vaginal discharge-wnl  Perineum: intact, non tender  Instill given per clinic protocol. Patient catheterized for a residual of 40ml. A/P:  Dx   Encounter Diagnosis   Name Primary? Chronic interstitial cystitis Yes     Chemstrip performed. Continue estradiol cream  Reviewed bowel regimen  Reviewed instill instructions  Patient tolerated procedure well. Will need f/u w/ Dr. Gisell Costa in  May '24. Next instill scheduled for 2wks.

## 2024-01-04 ENCOUNTER — TELEPHONE (OUTPATIENT)
Dept: UROLOGY | Facility: CLINIC | Age: 64
End: 2024-01-04
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
## 2024-03-21 ENCOUNTER — OFFICE VISIT (OUTPATIENT)
Dept: UROLOGY | Facility: CLINIC | Age: 64
End: 2024-03-21
Attending: OBSTETRICS & GYNECOLOGY
Payer: COMMERCIAL

## 2024-03-21 DIAGNOSIS — R39.89 BLADDER PAIN: ICD-10-CM

## 2024-03-21 DIAGNOSIS — N30.10 CHRONIC INTERSTITIAL CYSTITIS: Primary | ICD-10-CM

## 2024-03-21 PROCEDURE — 51700 IRRIGATION OF BLADDER: CPT

## 2024-03-21 RX ORDER — BUPIVACAINE HYDROCHLORIDE 5 MG/ML
30 INJECTION, SOLUTION EPIDURAL; INTRACAUDAL ONCE
Status: COMPLETED | OUTPATIENT
Start: 2024-03-21 | End: 2024-03-21

## 2024-03-21 RX ORDER — AZELASTINE 1 MG/ML
2 SPRAY, METERED NASAL DAILY
COMMUNITY

## 2024-03-21 RX ORDER — CETIRIZINE HYDROCHLORIDE 10 MG/1
10 TABLET ORAL DAILY
COMMUNITY

## 2024-03-21 RX ORDER — SODIUM CHLORIDE 9 MG/ML
17 INJECTION, SOLUTION INTRAMUSCULAR; INTRAVENOUS; SUBCUTANEOUS ONCE
Status: COMPLETED | OUTPATIENT
Start: 2024-03-21 | End: 2024-03-21

## 2024-03-21 RX ORDER — LIDOCAINE HYDROCHLORIDE 20 MG/ML
10 JELLY TOPICAL ONCE
Status: COMPLETED | OUTPATIENT
Start: 2024-03-21 | End: 2024-03-21

## 2024-03-21 RX ORDER — HEPARIN SODIUM 10000 [USP'U]/ML
40000 INJECTION, SOLUTION INTRAVENOUS; SUBCUTANEOUS ONCE
Status: COMPLETED | OUTPATIENT
Start: 2024-03-21 | End: 2024-03-21

## 2024-03-21 RX ORDER — TIZANIDINE 4 MG/1
4 TABLET ORAL 2 TIMES DAILY PRN
COMMUNITY
Start: 2024-02-22

## 2024-03-21 RX ORDER — SEMAGLUTIDE 2.68 MG/ML
2 INJECTION, SOLUTION SUBCUTANEOUS WEEKLY
COMMUNITY
Start: 2024-01-18

## 2024-03-21 RX ADMIN — LIDOCAINE HYDROCHLORIDE 10 ML: 20 JELLY TOPICAL at 15:40:00

## 2024-03-21 RX ADMIN — HEPARIN SODIUM 40000 UNITS: 10000 INJECTION, SOLUTION INTRAVENOUS; SUBCUTANEOUS at 16:40:00

## 2024-03-21 RX ADMIN — BUPIVACAINE HYDROCHLORIDE 30 ML: 5 INJECTION, SOLUTION EPIDURAL; INTRACAUDAL at 15:40:00

## 2024-03-21 RX ADMIN — SODIUM CHLORIDE 17 ML: 9 INJECTION, SOLUTION INTRAMUSCULAR; INTRAVENOUS; SUBCUTANEOUS at 15:40:00

## 2024-03-21 NOTE — PROCEDURES
S:  Patient here for instill  given hx of bladder pain.  Patient reports feeling IC flare and needing to move up appt one day  Past instills have been helpful  Denies gross hematuria  Denies  s/sx of UTI  Bowels BOWEL STATUS: constipated   Also using Subaxone and Diazepam suppositories for bladder pain    O:  VS There were no vitals filed for this visit.  Gen: NAD  :   Ext. Gen:  no lesions  Urethra:  no atrophy  Vaginal discharge-none  Perineum: intact,  tender  Instill given per clinic protocol.   Patient catheterized for a residual of 30ml.      A/P:  Dx   Encounter Diagnosis   Name Primary?    Chronic interstitial cystitis Yes     Chemstrip performed.    Continue estradiol cream  Reviewed bowel regimen  Reviewed instill instructions  Patient tolerated procedure well.    Will need f/u w/ Dr. Cori Clayton as needed  Next instill scheduled for next week.  Requests refill of vaginal suppositories be sent to local compounding pharmacy, medication pended for provider sign off

## 2024-03-22 RX ORDER — DIAZEPAM 10 MG/1
10 TABLET ORAL NIGHTLY PRN
Qty: 45 TABLET | Refills: 1 | Status: SHIPPED | OUTPATIENT
Start: 2024-03-22

## 2024-04-09 ENCOUNTER — TELEPHONE (OUTPATIENT)
Dept: UROLOGY | Facility: CLINIC | Age: 64
End: 2024-04-09

## 2024-05-23 ENCOUNTER — APPOINTMENT (OUTPATIENT)
Dept: URBAN - METROPOLITAN AREA CLINIC 248 | Age: 64
Setting detail: DERMATOLOGY
End: 2024-05-28

## 2024-05-23 DIAGNOSIS — L82.0 INFLAMED SEBORRHEIC KERATOSIS: ICD-10-CM

## 2024-05-23 DIAGNOSIS — L03.01 CELLULITIS OF FINGER: ICD-10-CM

## 2024-05-23 PROBLEM — L03.011 CELLULITIS OF RIGHT FINGER: Status: ACTIVE | Noted: 2024-05-23

## 2024-05-23 PROCEDURE — OTHER COUNSELING: OTHER

## 2024-05-23 PROCEDURE — 17110 DESTRUCT B9 LESION 1-14: CPT

## 2024-05-23 PROCEDURE — OTHER PRESCRIPTION: OTHER

## 2024-05-23 PROCEDURE — OTHER PRESCRIPTION MEDICATION MANAGEMENT: OTHER

## 2024-05-23 PROCEDURE — 99203 OFFICE O/P NEW LOW 30 MIN: CPT | Mod: 25

## 2024-05-23 PROCEDURE — OTHER DEFER: OTHER

## 2024-05-23 PROCEDURE — OTHER LIQUID NITROGEN: OTHER

## 2024-05-23 RX ORDER — MUPIROCIN 20 MG/G
OINTMENT TOPICAL
Qty: 15 | Refills: 4 | Status: ERX | COMMUNITY
Start: 2024-05-23

## 2024-05-23 ASSESSMENT — LOCATION DETAILED DESCRIPTION DERM
LOCATION DETAILED: RIGHT DISTAL ULNAR DORSAL RING FINGER
LOCATION DETAILED: LEFT MEDIAL FOREHEAD

## 2024-05-23 ASSESSMENT — LOCATION ZONE DERM
LOCATION ZONE: FACE
LOCATION ZONE: FINGER

## 2024-05-23 ASSESSMENT — LOCATION SIMPLE DESCRIPTION DERM
LOCATION SIMPLE: RIGHT RING FINGER
LOCATION SIMPLE: LEFT FOREHEAD

## 2024-05-23 NOTE — HPI: EVALUATION OF SKIN LESION(S)
What Type Of Note Output Would You Prefer (Optional)?: Bullet Format
How Severe Are Your Spot(S)?: mild
Have Your Spot(S) Been Treated In The Past?: has not been treated
Hpi Title: Evaluation of a Skin Lesion
Additional History: Skinceuricals vitamin c

## 2024-05-23 NOTE — PROCEDURE: LIQUID NITROGEN
Show Topical Anesthesia Variable?: Yes
Consent: The patient's consent was obtained including but not limited to risks of crusting, scabbing, blistering, scarring, darker or lighter pigmentary change, recurrence, incomplete removal and infection.
Post-Care Instructions: I reviewed with the patient in detail post-care instructions. Patient is to wear sunprotection, and avoid picking at any of the treated lesions. Pt may apply Vaseline to crusted or scabbing areas.
Medical Necessity Information: It is in your best interest to select a reason for this procedure from the list below. All of these items fulfill various CMS LCD requirements except the new and changing color options.
Detail Level: Detailed
Render Post-Care Instructions In Note?: no
Number Of Freeze-Thaw Cycles: 1 freeze-thaw cycle
Spray Paint Text: The liquid nitrogen was applied to the skin utilizing a spray paint frosting technique.
Duration Of Freeze Thaw-Cycle (Seconds): 5-10
Medical Necessity Clause: This procedure was medically necessary because the lesions that were treated were:

## 2024-05-23 NOTE — PROCEDURE: DEFER
Detail Level: Detailed
Other Procedure: nail biopsy
X Size Of Lesion In Cm (Optional): 0
Introduction Text (Please End With A Colon): The following procedure was deferred:
Scheduling Instructions (Optional): appt with dr. alves

## 2024-05-23 NOTE — PROCEDURE: PRESCRIPTION MEDICATION MANAGEMENT
Render In Strict Bullet Format?: No
Detail Level: Zone
Continue Regimen: Z-rico that was rx by GP
Initiate Treatment: mupirocin 2 % topical ointment \\nQuantity: 15.0 g  Days Supply: 30\\nSig: Apply to the skin around the nail QHS.

## 2024-05-23 NOTE — HPI: WARTS (VERRUCA)
How Severe Are Your Warts?: mild
Is This A New Presentation, Or A Follow-Up?: Wart
Treatment Number (Optional): 1
Additional History: Z-rico rx by GP. GP Thought it was an infections. Rx has not been helping.

## 2024-06-10 ENCOUNTER — OFFICE VISIT (OUTPATIENT)
Dept: UROLOGY | Facility: CLINIC | Age: 64
End: 2024-06-10
Attending: OBSTETRICS & GYNECOLOGY
Payer: COMMERCIAL

## 2024-06-10 VITALS — BODY MASS INDEX: 31.34 KG/M2 | HEIGHT: 66 IN | RESPIRATION RATE: 20 BRPM | WEIGHT: 195 LBS

## 2024-06-10 DIAGNOSIS — N30.10 CHRONIC INTERSTITIAL CYSTITIS: Primary | ICD-10-CM

## 2024-06-10 PROCEDURE — 51700 IRRIGATION OF BLADDER: CPT

## 2024-06-10 RX ORDER — SODIUM CHLORIDE 9 MG/ML
17 INJECTION, SOLUTION INTRAMUSCULAR; INTRAVENOUS; SUBCUTANEOUS ONCE
Status: COMPLETED | OUTPATIENT
Start: 2024-06-10 | End: 2024-06-10

## 2024-06-10 RX ORDER — HEPARIN SODIUM 10000 [USP'U]/ML
40000 INJECTION, SOLUTION INTRAVENOUS; SUBCUTANEOUS ONCE
Status: COMPLETED | OUTPATIENT
Start: 2024-06-10 | End: 2024-06-10

## 2024-06-10 RX ORDER — LIDOCAINE HYDROCHLORIDE 20 MG/ML
10 JELLY TOPICAL ONCE
Status: COMPLETED | OUTPATIENT
Start: 2024-06-10 | End: 2024-06-10

## 2024-06-10 RX ORDER — BUPIVACAINE HYDROCHLORIDE 5 MG/ML
30 INJECTION, SOLUTION EPIDURAL; INTRACAUDAL ONCE
Status: COMPLETED | OUTPATIENT
Start: 2024-06-10 | End: 2024-06-10

## 2024-06-10 RX ADMIN — HEPARIN SODIUM 40000 UNITS: 10000 INJECTION, SOLUTION INTRAVENOUS; SUBCUTANEOUS at 11:20:00

## 2024-06-10 RX ADMIN — BUPIVACAINE HYDROCHLORIDE 30 ML: 5 INJECTION, SOLUTION EPIDURAL; INTRACAUDAL at 11:20:00

## 2024-06-10 RX ADMIN — LIDOCAINE HYDROCHLORIDE 10 ML: 20 JELLY TOPICAL at 11:20:00

## 2024-06-10 RX ADMIN — SODIUM CHLORIDE 17 ML: 9 INJECTION, SOLUTION INTRAMUSCULAR; INTRAVENOUS; SUBCUTANEOUS at 13:27:00

## 2024-06-10 NOTE — PROCEDURES
S:  Patient here for rescue instill  given hx of bladder pain.  Patient reports feeling bladder pain  Past instills have been helpful  Denies  s/sx of UTI  Bowels BOWEL STATUS: constipated   Using \"supplements\"  Also using vag valium suppositories for bladder pain    O:  VS   Vitals:    06/10/24 1319   Resp: 20   Weight: 195 lb   Height: 66\"     Gen: NAD  :   Ext. Gen: wnl  no lesions  Urethra:  some atrophy  Vaginal discharge-wnl  Perineum: intact, non tender    Verbal consent obtained  Patient up to bathroom to void prior to procedure  Patient then catheterized per sterile technique for a residual of 30ml.    Instill given per clinic protocol.     A:  Dx   Encounter Diagnosis   Name Primary?    Chronic interstitial cystitis Yes       P:     Continue estradiol cream  Reviewed bowel regimen  Reviewed instill instructions  Patient tolerated procedure well.    Will need f/u w/ VIRGINIA Napier in  a week for yearly.  May need instill next week.  All questions answered  She understands and agrees to plan

## 2024-06-20 ENCOUNTER — HOSPITAL ENCOUNTER (OUTPATIENT)
Dept: MAMMOGRAPHY | Facility: HOSPITAL | Age: 64
Discharge: HOME | End: 2024-06-20
Attending: OBSTETRICS & GYNECOLOGY

## 2024-06-20 DIAGNOSIS — Z12.31 SCREENING MAMMOGRAM FOR BREAST CANCER: ICD-10-CM

## 2024-06-20 PROCEDURE — 77063 BREAST TOMOSYNTHESIS BI: CPT | Performed by: OBSTETRICS & GYNECOLOGY

## 2024-06-20 PROCEDURE — 77067 SCR MAMMO BI INCL CAD: CPT | Performed by: OBSTETRICS & GYNECOLOGY

## 2024-06-21 ENCOUNTER — OFFICE VISIT (OUTPATIENT)
Dept: UROLOGY | Facility: CLINIC | Age: 64
End: 2024-06-21
Attending: OBSTETRICS & GYNECOLOGY

## 2024-06-21 VITALS — RESPIRATION RATE: 18 BRPM | HEIGHT: 66 IN | WEIGHT: 195 LBS | BODY MASS INDEX: 31.34 KG/M2

## 2024-06-21 DIAGNOSIS — N30.10 CHRONIC INTERSTITIAL CYSTITIS: Primary | ICD-10-CM

## 2024-06-21 PROCEDURE — 51700 IRRIGATION OF BLADDER: CPT

## 2024-06-21 RX ORDER — HEPARIN SODIUM 10000 [USP'U]/ML
40000 INJECTION, SOLUTION INTRAVENOUS; SUBCUTANEOUS ONCE
Status: COMPLETED | OUTPATIENT
Start: 2024-06-21 | End: 2024-06-21

## 2024-06-21 RX ORDER — LIDOCAINE HYDROCHLORIDE 20 MG/ML
10 JELLY TOPICAL ONCE
Status: COMPLETED | OUTPATIENT
Start: 2024-06-21 | End: 2024-06-21

## 2024-06-21 RX ORDER — BUPIVACAINE HYDROCHLORIDE 5 MG/ML
30 INJECTION, SOLUTION EPIDURAL; INTRACAUDAL ONCE
Status: COMPLETED | OUTPATIENT
Start: 2024-06-21 | End: 2024-06-21

## 2024-06-21 RX ORDER — SODIUM CHLORIDE 9 MG/ML
17 INJECTION, SOLUTION INTRAMUSCULAR; INTRAVENOUS; SUBCUTANEOUS ONCE
Status: COMPLETED | OUTPATIENT
Start: 2024-06-21 | End: 2024-06-21

## 2024-06-21 RX ADMIN — HEPARIN SODIUM 40000 UNITS: 10000 INJECTION, SOLUTION INTRAVENOUS; SUBCUTANEOUS at 15:10:00

## 2024-06-21 RX ADMIN — BUPIVACAINE HYDROCHLORIDE 30 ML: 5 INJECTION, SOLUTION EPIDURAL; INTRACAUDAL at 15:10:00

## 2024-06-21 RX ADMIN — SODIUM CHLORIDE 17 ML: 9 INJECTION, SOLUTION INTRAMUSCULAR; INTRAVENOUS; SUBCUTANEOUS at 15:10:00

## 2024-06-21 RX ADMIN — LIDOCAINE HYDROCHLORIDE 10 ML: 20 JELLY TOPICAL at 15:10:00

## 2024-06-21 NOTE — PROCEDURES
S:  Patient here for instill  given hx of bladder pain.  Patient reports feeling bladder pain  Past instills have been helpful  Denies  s/sx of UTI  Bowels BOWEL STATUS: constipated   Using supplements  Also using vag valium for bladder pain    O:  VS   Vitals:    06/21/24 1507   Resp: 18   Weight: 195 lb   Height: 66\"     Gen: NAD  :   Ext. Gen: some atrophy, no lesions  Urethra:  some atrophy  Vaginal discharge-wnl  Perineum: intact, non tender    Verbal consent obtained  Patient up to bathroom to void prior to procedure  Patient then catheterized per sterile technique for a residual of 30ml.    Instill given per clinic protocol.     A:  Dx   Encounter Diagnosis   Name Primary?    Chronic interstitial cystitis Yes       P:     Continue estradiol cream  Reviewed bowel regimen  Reviewed instill instructions  Patient tolerated procedure well.    Will need f/u w/ Dr. Cori Clayton in  July.  Next instill scheduled for next week.  All questions answered  She understands and agrees to plan

## 2024-07-08 ENCOUNTER — OFFICE VISIT (OUTPATIENT)
Dept: UROLOGY | Facility: CLINIC | Age: 64
End: 2024-07-08
Attending: OBSTETRICS & GYNECOLOGY
Payer: COMMERCIAL

## 2024-07-08 VITALS — TEMPERATURE: 98 F | BODY MASS INDEX: 31 KG/M2 | RESPIRATION RATE: 18 BRPM | HEIGHT: 66 IN

## 2024-07-08 DIAGNOSIS — N81.84 PELVIC MUSCLE WASTING: ICD-10-CM

## 2024-07-08 DIAGNOSIS — N95.2 POSTMENOPAUSAL ATROPHIC VAGINITIS: ICD-10-CM

## 2024-07-08 DIAGNOSIS — N30.10 CHRONIC INTERSTITIAL CYSTITIS: Primary | ICD-10-CM

## 2024-07-08 PROCEDURE — 51700 IRRIGATION OF BLADDER: CPT | Performed by: PHYSICIAN ASSISTANT

## 2024-07-08 PROCEDURE — 99212 OFFICE O/P EST SF 10 MIN: CPT

## 2024-07-08 RX ORDER — BUPIVACAINE HYDROCHLORIDE 5 MG/ML
30 INJECTION, SOLUTION EPIDURAL; INTRACAUDAL ONCE
Status: COMPLETED | OUTPATIENT
Start: 2024-07-08 | End: 2024-07-08

## 2024-07-08 RX ORDER — HEPARIN SODIUM 10000 [USP'U]/ML
40000 INJECTION, SOLUTION INTRAVENOUS; SUBCUTANEOUS ONCE
Status: COMPLETED | OUTPATIENT
Start: 2024-07-08 | End: 2024-07-08

## 2024-07-08 RX ORDER — LIDOCAINE HYDROCHLORIDE 20 MG/ML
10 JELLY TOPICAL ONCE
Status: COMPLETED | OUTPATIENT
Start: 2024-07-08 | End: 2024-07-08

## 2024-07-08 RX ORDER — SODIUM CHLORIDE 9 MG/ML
17 INJECTION, SOLUTION INTRAMUSCULAR; INTRAVENOUS; SUBCUTANEOUS ONCE
Status: COMPLETED | OUTPATIENT
Start: 2024-07-08 | End: 2024-07-08

## 2024-07-08 RX ADMIN — SODIUM CHLORIDE 17 ML: 9 INJECTION, SOLUTION INTRAMUSCULAR; INTRAVENOUS; SUBCUTANEOUS at 14:45:00

## 2024-07-08 RX ADMIN — HEPARIN SODIUM 40000 UNITS: 10000 INJECTION, SOLUTION INTRAVENOUS; SUBCUTANEOUS at 14:45:00

## 2024-07-08 RX ADMIN — LIDOCAINE HYDROCHLORIDE 10 ML: 20 JELLY TOPICAL at 14:40:00

## 2024-07-08 RX ADMIN — BUPIVACAINE HYDROCHLORIDE 30 ML: 5 INJECTION, SOLUTION EPIDURAL; INTRACAUDAL at 14:45:00

## 2024-07-08 NOTE — PROCEDURES
.S:  Patient here for instill #3 given hx of bladder pain. Here for follow up with VIRGINIA Napier.  Patient reports bladder flare and migraine today  Past instills have been helpful.  Cancelled last appt d/t home improvement issues  Denies  s/sx of UTI    O:  VS   Vitals:    07/08/24 1412   Resp: 18   Temp: 97.8 °F (36.6 °C)   Height: 66\"     Gen: NAD  :   Ext. Gen: no atrophy, no lesions  Urethra: no atrophy  Vaginal discharge-none noted  Perineum: intact, non tender  Instill given per clinic protocol.   Patient catheterized for a residual of 170ml.  Patient did not void prior to instill    A/P:  Dx   Encounter Diagnoses   Name Primary?    Chronic interstitial cystitis Yes    Postmenopausal atrophic vaginitis     Pelvic muscle wasting       Continue estradiol cream  Reviewed instill instructions  Patient tolerated procedure well.    Will need f/u w/ VIRGINIA Napier or Dr. Clayton as directed.  Next instill scheduled for 1 week.

## 2024-07-08 NOTE — PROGRESS NOTES
Patient presents to follow up IC    Reports current flare of sx (bladder pain, increased frequency)  Requesting instill #3 today (has been doing every other week with +improvement)  Reports stress, heat, and known dietary triggers    Using vag valium, azo prn with flares  Using vag estrogen 2x weekly    Denies current UTI sx  Denies gross hematuria    Bowels constipated    Urogynecology Summary:  Urogynecology Summary  Prolapse: No  JAHAIRA: No  Urge Incontinence: No  Nocturia Frequency: 2  Frequency: 2 - 3 hours  Incomplete emptying: Yes (sometimes)  Constipation: Yes  Wears pad day?: 0  Wears Pad Night?: 0  Activities are limited by UI/POP?: No  Currently Sexually Active: No  Avoids sexual activity due to: Pain    Vitals:  Temp 97.8 °F (36.6 °C)   Resp 18   Ht 66\"   BMI 31.47 kg/m²     GENERAL EXAM:  GENERAL: alert & oriented, NAD  HEENT: NC/AT, sclera without injection  SKIN: no rashes  LUNGS:  without increased respiratory effort  ABDOMEN: soft, non-tender, non-distended, no masses appreciated  EXT: no edema    PELVIC EXAM: LOWELL Jacobsen, present for exam as a chaperone  Ext. Gen: no atrophy, no lesions  Urethra: no atrophy, nontender  Bladder: some fullness, nontender  Vagina: some atrophy  Cervix & Uterus: absent  Adnexa:no masses, nontender  Perineum: nontender  Anus: wnl  Rectum: defer     PELVIS FLOOR NEUROMUSCULAR FUNCTION:  Strength:  2, +PF tenderness  Perineal Sensation:  Normal     PELVIC SUPPORT:  [place]:  0  Ant:  1  Post:  1  CST:  negative  UVJ: somewhat hypermobile    Impression/Plan:    ICD-10-CM    1. Chronic interstitial cystitis  N30.10 EEH AMB UG BLADDER INSTILLATION     bupivacaine PF (Marcaine) 0.5% injection     heparin (Porcine) 09736 Units/mL injection 40,000 Units     lidocaine (Urojet) 2 % urethral jelly 10 mL     sodium chloride 0.9% PF injection 17 mL      2. Postmenopausal atrophic vaginitis  N95.2       3. Pelvic muscle wasting  N81.84           Discussion Items:   Discussed mgmt of  vulvovaginal atrophy with vaginal estrogen cream. Reviewed associated benefits, risks, alternatives, and goals. Recommend low dose 2x/week treatment.    Bowel management reviewed. Discussed using fiber daily w/ addition of miralax as needed.    Discussed avoidance / minimizing known triggers      Treatment Plan:  Instill to be given by nursing staff today, plan to continue weekly through the month before she leaves on vacation  Continue vag estrogen cream as prescribed  Bowel regimen  Bladder diet/drill  Pelvic floor exercises  Call with s/sx of UTI    All questions answered  She understands and agrees to plan    Return in about 1 year (around 7/8/2025) for IC. (PRN for instills)    Anjelica Trinidad PA-C    Note to patient: The 21st Century Cures Act makes medical notes like these available to patients in the interest of transparency.  However, be advised this is a medical document.  It is intended as peer to peer communication.  It is written in medical language and may contain abbreviations or verbiage that are unfamiliar.  It may appear blunt or direct.  Medical documents are intended to carry relevant information, facts as evident, and the clinical opinion of the practitioner.

## 2024-07-09 DIAGNOSIS — R39.89 BLADDER PAIN: ICD-10-CM

## 2024-07-09 DIAGNOSIS — N30.10 CHRONIC INTERSTITIAL CYSTITIS: ICD-10-CM

## 2024-07-09 RX ORDER — DIAZEPAM 10 MG/1
10 TABLET ORAL NIGHTLY PRN
Qty: 45 TABLET | Refills: 3 | OUTPATIENT
Start: 2024-07-09

## 2024-07-09 NOTE — TELEPHONE ENCOUNTER
TC from pt saying she feels she is in a flare from the vaginal exam yesterday.   Doesn't have any refills vaginal valium.   Requesting a sooner instill.

## 2024-07-15 ENCOUNTER — OFFICE VISIT (OUTPATIENT)
Dept: UROLOGY | Facility: CLINIC | Age: 64
End: 2024-07-15
Attending: OBSTETRICS & GYNECOLOGY
Payer: COMMERCIAL

## 2024-07-15 VITALS — RESPIRATION RATE: 18 BRPM | HEIGHT: 66 IN | BODY MASS INDEX: 31.34 KG/M2 | WEIGHT: 195 LBS

## 2024-07-15 DIAGNOSIS — N30.10 CHRONIC INTERSTITIAL CYSTITIS: Primary | ICD-10-CM

## 2024-07-15 DIAGNOSIS — R39.89 BLADDER PAIN: ICD-10-CM

## 2024-07-15 PROCEDURE — 51700 IRRIGATION OF BLADDER: CPT

## 2024-07-15 RX ORDER — SODIUM CHLORIDE 9 MG/ML
17 INJECTION, SOLUTION INTRAMUSCULAR; INTRAVENOUS; SUBCUTANEOUS ONCE
Status: COMPLETED | OUTPATIENT
Start: 2024-07-15 | End: 2024-07-15

## 2024-07-15 RX ORDER — HEPARIN SODIUM 10000 [USP'U]/ML
40000 INJECTION, SOLUTION INTRAVENOUS; SUBCUTANEOUS ONCE
Status: COMPLETED | OUTPATIENT
Start: 2024-07-15 | End: 2024-07-15

## 2024-07-15 RX ORDER — LIDOCAINE HYDROCHLORIDE 20 MG/ML
10 JELLY TOPICAL ONCE
Status: COMPLETED | OUTPATIENT
Start: 2024-07-15 | End: 2024-07-15

## 2024-07-15 RX ORDER — BUPIVACAINE HYDROCHLORIDE 5 MG/ML
30 INJECTION, SOLUTION EPIDURAL; INTRACAUDAL ONCE
Status: COMPLETED | OUTPATIENT
Start: 2024-07-15 | End: 2024-07-15

## 2024-07-15 RX ADMIN — HEPARIN SODIUM 40000 UNITS: 10000 INJECTION, SOLUTION INTRAVENOUS; SUBCUTANEOUS at 14:35:00

## 2024-07-15 RX ADMIN — LIDOCAINE HYDROCHLORIDE 10 ML: 20 JELLY TOPICAL at 14:36:00

## 2024-07-15 RX ADMIN — BUPIVACAINE HYDROCHLORIDE 30 ML: 5 INJECTION, SOLUTION EPIDURAL; INTRACAUDAL at 14:35:00

## 2024-07-15 RX ADMIN — SODIUM CHLORIDE 17 ML: 9 INJECTION, SOLUTION INTRAMUSCULAR; INTRAVENOUS; SUBCUTANEOUS at 14:36:00

## 2024-07-15 NOTE — PROCEDURES
S:  Patient here for instill #4 given hx of bladder pain.  Patient reports feeling bladder pain.  Past instills have been helpful  Denies  s/sx of UTI  Bowels BOWEL STATUS: constipated   Using\"supplements\"  Also using AZO and vag valium for bladder and vaginal pain    O:  VS   Vitals:    07/15/24 1429   Resp: 18   Weight: 195 lb   Height: 66\"     Gen: NAD  :   Ext. Gen: wnl  no lesions  Urethra:  some atrophy  Vaginal discharge-wnl  Perineum: intact, non tender     Verbal consent obtained  Patient up to bathroom to void prior to procedure  Patient then catheterized per sterile technique for a residual of 4ml.    Instill given per clinic protocol.        A:  Dx   Encounter Diagnoses   Name Primary?    Chronic interstitial cystitis Yes    Bladder pain        P:     Continue estradiol cream  Reviewed instill instructions  Patient tolerated procedure well.    Will need f/u w/ Dr. Cori Clayton in  July 2025.  Next instill scheduled for next week.  All questions answered  She understands and agrees to plan

## 2024-07-22 ENCOUNTER — OFFICE VISIT (OUTPATIENT)
Dept: UROLOGY | Facility: CLINIC | Age: 64
End: 2024-07-22
Attending: OBSTETRICS & GYNECOLOGY
Payer: COMMERCIAL

## 2024-07-22 VITALS — RESPIRATION RATE: 18 BRPM | BODY MASS INDEX: 31.34 KG/M2 | HEIGHT: 66 IN | WEIGHT: 195 LBS

## 2024-07-22 DIAGNOSIS — N30.10 CHRONIC INTERSTITIAL CYSTITIS: Primary | ICD-10-CM

## 2024-07-22 PROCEDURE — 51700 IRRIGATION OF BLADDER: CPT

## 2024-07-22 RX ORDER — SODIUM CHLORIDE 9 MG/ML
17 INJECTION, SOLUTION INTRAMUSCULAR; INTRAVENOUS; SUBCUTANEOUS ONCE
Status: COMPLETED | OUTPATIENT
Start: 2024-07-22 | End: 2024-07-22

## 2024-07-22 RX ORDER — LIDOCAINE HYDROCHLORIDE 20 MG/ML
10 JELLY TOPICAL ONCE
Status: COMPLETED | OUTPATIENT
Start: 2024-07-22 | End: 2024-07-22

## 2024-07-22 RX ORDER — HEPARIN SODIUM 10000 [USP'U]/ML
40000 INJECTION, SOLUTION INTRAVENOUS; SUBCUTANEOUS ONCE
Status: COMPLETED | OUTPATIENT
Start: 2024-07-22 | End: 2024-07-22

## 2024-07-22 RX ORDER — BUPIVACAINE HYDROCHLORIDE 5 MG/ML
30 INJECTION, SOLUTION EPIDURAL; INTRACAUDAL ONCE
Status: COMPLETED | OUTPATIENT
Start: 2024-07-22 | End: 2024-07-22

## 2024-07-22 RX ADMIN — HEPARIN SODIUM 40000 UNITS: 10000 INJECTION, SOLUTION INTRAVENOUS; SUBCUTANEOUS at 14:38:00

## 2024-07-22 RX ADMIN — SODIUM CHLORIDE 17 ML: 9 INJECTION, SOLUTION INTRAMUSCULAR; INTRAVENOUS; SUBCUTANEOUS at 14:38:00

## 2024-07-22 RX ADMIN — BUPIVACAINE HYDROCHLORIDE 30 ML: 5 INJECTION, SOLUTION EPIDURAL; INTRACAUDAL at 14:38:00

## 2024-07-22 RX ADMIN — LIDOCAINE HYDROCHLORIDE 10 ML: 20 JELLY TOPICAL at 14:45:00

## 2024-07-22 NOTE — PROCEDURES
S:  Patient here for instill #5 given hx of bladder pain.  Patient reports feeling better  Past instills have been helpful  Denies  s/sx of UTI  Bowels BOWEL STATUS: constipated   Using \"supplements\"   Also using AZO and vag valium for bladder and vaginal pain     O:  VS   Vitals:    07/22/24 1437   Resp: 18   Weight: 195 lb   Height: 66\"     Gen: NAD  :   Ext. Gen: some atrophy, no lesions  Urethra:  some atrophy  Vaginal discharge-wnl  Perineum: intact, non tender    Verbal consent obtained  Patient up to bathroom to void prior to procedure  Patient then catheterized per sterile technique for a residual of 30ml.    Instill given per clinic protocol.     A:  Dx   Encounter Diagnosis   Name Primary?    Chronic interstitial cystitis Yes       P:     Continue estradiol cream  Reviewed instill instructions  Patient tolerated procedure well.    Will need f/u w/ Dr. Cori Clayton in  July '25.  Next instill scheduled for week.  All questions answered  She understands and agrees to plan

## 2024-10-10 ENCOUNTER — OFFICE VISIT (OUTPATIENT)
Dept: UROLOGY | Facility: CLINIC | Age: 64
End: 2024-10-10
Attending: OBSTETRICS & GYNECOLOGY
Payer: COMMERCIAL

## 2024-10-10 VITALS — TEMPERATURE: 98 F | RESPIRATION RATE: 18 BRPM

## 2024-10-10 DIAGNOSIS — N30.10 CHRONIC INTERSTITIAL CYSTITIS: Primary | ICD-10-CM

## 2024-10-10 PROCEDURE — 51700 IRRIGATION OF BLADDER: CPT

## 2024-10-10 RX ORDER — BUPIVACAINE HYDROCHLORIDE 5 MG/ML
30 INJECTION, SOLUTION EPIDURAL; INTRACAUDAL ONCE
Status: COMPLETED | OUTPATIENT
Start: 2024-10-10 | End: 2024-10-10

## 2024-10-10 RX ORDER — LIDOCAINE HYDROCHLORIDE 20 MG/ML
10 JELLY TOPICAL ONCE
Status: COMPLETED | OUTPATIENT
Start: 2024-10-10 | End: 2024-10-10

## 2024-10-10 RX ORDER — SODIUM CHLORIDE 9 MG/ML
17 INJECTION, SOLUTION INTRAMUSCULAR; INTRAVENOUS; SUBCUTANEOUS ONCE
Status: COMPLETED | OUTPATIENT
Start: 2024-10-10 | End: 2024-10-10

## 2024-10-10 RX ORDER — HEPARIN SODIUM 10000 [USP'U]/ML
40000 INJECTION, SOLUTION INTRAVENOUS; SUBCUTANEOUS ONCE
Status: COMPLETED | OUTPATIENT
Start: 2024-10-10 | End: 2024-10-10

## 2024-10-10 RX ADMIN — SODIUM CHLORIDE 17 ML: 9 INJECTION, SOLUTION INTRAMUSCULAR; INTRAVENOUS; SUBCUTANEOUS at 14:42:00

## 2024-10-10 RX ADMIN — BUPIVACAINE HYDROCHLORIDE 30 ML: 5 INJECTION, SOLUTION EPIDURAL; INTRACAUDAL at 14:42:00

## 2024-10-10 RX ADMIN — LIDOCAINE HYDROCHLORIDE 10 ML: 20 JELLY TOPICAL at 14:42:00

## 2024-10-10 RX ADMIN — HEPARIN SODIUM 40000 UNITS: 10000 INJECTION, SOLUTION INTRAVENOUS; SUBCUTANEOUS at 14:42:00

## 2024-10-10 NOTE — PROCEDURES
S:  Patient here for instill  given hx of bladder pain.  Patient reports feeling bladder pain  Past instills have been helpful  Denies  s/sx of UTI  Also using vaginal valium for bladder pain    O:  VS   Vitals:    10/10/24 1438   Resp: 18   Temp: 98.2 °F (36.8 °C)     Gen: NAD  :   Ext. Gen: wnl  no lesions  Urethra:  some atrophy  Vaginal discharge-wnl  Perineum: intact, non tender     Verbal consent obtained  Patient up to bathroom to void prior to procedure  Patient then catheterized per sterile technique for a residual of 10ml.    Instill given per clinic protocol.     A:  Dx   Encounter Diagnosis   Name Primary?    Chronic interstitial cystitis Yes       P:     Continue estradiol cream  Reviewed instill instructions  Patient tolerated procedure well.    All questions answered  She understands and agrees to plan

## 2024-10-28 ENCOUNTER — OFFICE VISIT (OUTPATIENT)
Dept: UROLOGY | Facility: CLINIC | Age: 64
End: 2024-10-28
Attending: OBSTETRICS & GYNECOLOGY
Payer: COMMERCIAL

## 2024-10-28 VITALS — BODY MASS INDEX: 27 KG/M2 | RESPIRATION RATE: 18 BRPM | HEIGHT: 66 IN | WEIGHT: 168 LBS

## 2024-10-28 DIAGNOSIS — N30.10 CHRONIC INTERSTITIAL CYSTITIS: Primary | ICD-10-CM

## 2024-10-28 PROCEDURE — 51700 IRRIGATION OF BLADDER: CPT

## 2024-10-28 RX ORDER — BUPIVACAINE HYDROCHLORIDE 5 MG/ML
30 INJECTION, SOLUTION EPIDURAL; INTRACAUDAL ONCE
Status: COMPLETED | OUTPATIENT
Start: 2024-10-28 | End: 2024-10-28

## 2024-10-28 RX ORDER — HEPARIN SODIUM 10000 [USP'U]/ML
40000 INJECTION, SOLUTION INTRAVENOUS; SUBCUTANEOUS ONCE
Status: COMPLETED | OUTPATIENT
Start: 2024-10-28 | End: 2024-10-28

## 2024-10-28 RX ORDER — LIDOCAINE HYDROCHLORIDE 20 MG/ML
10 JELLY TOPICAL ONCE
Status: COMPLETED | OUTPATIENT
Start: 2024-10-28 | End: 2024-10-28

## 2024-10-28 RX ORDER — SODIUM CHLORIDE 9 MG/ML
17 INJECTION, SOLUTION INTRAMUSCULAR; INTRAVENOUS; SUBCUTANEOUS ONCE
Status: COMPLETED | OUTPATIENT
Start: 2024-10-28 | End: 2024-10-28

## 2024-10-28 RX ADMIN — LIDOCAINE HYDROCHLORIDE 10 ML: 20 JELLY TOPICAL at 13:40:00

## 2024-10-28 RX ADMIN — SODIUM CHLORIDE 17 ML: 9 INJECTION, SOLUTION INTRAMUSCULAR; INTRAVENOUS; SUBCUTANEOUS at 13:42:00

## 2024-10-28 RX ADMIN — BUPIVACAINE HYDROCHLORIDE 30 ML: 5 INJECTION, SOLUTION EPIDURAL; INTRACAUDAL at 13:42:00

## 2024-10-28 RX ADMIN — HEPARIN SODIUM 40000 UNITS: 10000 INJECTION, SOLUTION INTRAVENOUS; SUBCUTANEOUS at 13:42:00

## 2024-10-28 NOTE — PROCEDURES
S:  Patient here for instill  given hx of bladder pain.  Patient reports feeling bladder pain  Past instills have been helpful  Denies  s/sx of UTI  Bowels BOWEL STATUS: regular   Using miralax and fiber combo  Also using vag valium for bladder pain    O:  VS   Vitals:    10/28/24 1341   Resp: 18   Weight: 168 lb   Height: 66\"     Gen: NAD  :   Ext. Gen:  atrophy, no lesions  Urethra + atrophy  Vaginal discharge-wnl  Perineum: intact, non tender    Verbal consent obtained  Patient up to bathroom to void prior to procedure  Patient then catheterized per sterile technique for a residual of 10ml.    Instill given per clinic protocol.     A:  Dx   Encounter Diagnosis   Name Primary?    Chronic interstitial cystitis Yes       P:     Continue estradiol cream  Reviewed bowel regimen  Reviewed instill instructions  Patient tolerated procedure well.    Will need f/u w/ VIRGINIA Napier in  July '25.  All questions answered  She understands and agrees to plan

## 2024-11-11 ENCOUNTER — OFFICE VISIT (OUTPATIENT)
Dept: UROLOGY | Facility: CLINIC | Age: 64
End: 2024-11-11
Attending: OBSTETRICS & GYNECOLOGY
Payer: COMMERCIAL

## 2024-11-11 VITALS — RESPIRATION RATE: 18 BRPM | HEIGHT: 66 IN | WEIGHT: 168 LBS | BODY MASS INDEX: 27 KG/M2

## 2024-11-11 DIAGNOSIS — N30.10 CHRONIC INTERSTITIAL CYSTITIS: Primary | ICD-10-CM

## 2024-11-11 PROCEDURE — 51700 IRRIGATION OF BLADDER: CPT

## 2024-11-11 PROCEDURE — 81002 URINALYSIS NONAUTO W/O SCOPE: CPT

## 2024-11-11 RX ORDER — HEPARIN SODIUM 10000 [USP'U]/ML
40000 INJECTION, SOLUTION INTRAVENOUS; SUBCUTANEOUS ONCE
Status: COMPLETED | OUTPATIENT
Start: 2024-11-11 | End: 2024-11-11

## 2024-11-11 RX ORDER — SODIUM CHLORIDE 9 MG/ML
17 INJECTION, SOLUTION INTRAMUSCULAR; INTRAVENOUS; SUBCUTANEOUS ONCE
Status: COMPLETED | OUTPATIENT
Start: 2024-11-11 | End: 2024-11-11

## 2024-11-11 RX ORDER — LIDOCAINE HYDROCHLORIDE 20 MG/ML
10 JELLY TOPICAL ONCE
Status: COMPLETED | OUTPATIENT
Start: 2024-11-11 | End: 2024-11-11

## 2024-11-11 RX ORDER — BUPIVACAINE HYDROCHLORIDE 5 MG/ML
30 INJECTION, SOLUTION EPIDURAL; INTRACAUDAL ONCE
Status: COMPLETED | OUTPATIENT
Start: 2024-11-11 | End: 2024-11-11

## 2024-11-11 RX ADMIN — SODIUM CHLORIDE 17 ML: 9 INJECTION, SOLUTION INTRAMUSCULAR; INTRAVENOUS; SUBCUTANEOUS at 14:44:00

## 2024-11-11 RX ADMIN — HEPARIN SODIUM 40000 UNITS: 10000 INJECTION, SOLUTION INTRAVENOUS; SUBCUTANEOUS at 14:44:00

## 2024-11-11 RX ADMIN — LIDOCAINE HYDROCHLORIDE 10 ML: 20 JELLY TOPICAL at 14:44:00

## 2024-11-11 RX ADMIN — BUPIVACAINE HYDROCHLORIDE 30 ML: 5 INJECTION, SOLUTION EPIDURAL; INTRACAUDAL at 14:44:00

## 2024-11-11 NOTE — PROCEDURES
S:  Patient here for instill #3 given hx of bladder pain.  Patient reports feeling bladder pain  Past instills have been helpful  Denies  s/sx of UTI  Bowels BOWEL STATUS: constipated   Using miralax and fiber combo   Also using vag valium for bladder pain    O:  VS   Vitals:    11/11/24 1443   Resp: 18   Weight: 168 lb   Height: 66\"     Gen: NAD  :   Ext. Gen:  atrophy, no lesions  Urethra:  + atrophy  Vaginal discharge-wnl  Perineum: intact, non tender    Verbal consent obtained  Patient up to bathroom to void prior to procedure  Patient then catheterized per sterile technique for a residual of 10ml.    Instill given per clinic protocol.     A:  Dx   Encounter Diagnosis   Name Primary?    Chronic interstitial cystitis Yes       P:   Chemstrip performed-wnl  Continue estradiol cream  Reviewed instill instructions  Patient tolerated procedure well.    Will need f/u w/ VIRGINIA Ba in  July '25.  Next instill scheduled for next wk.  All questions answered  She understands and agrees to plan

## 2024-11-18 ENCOUNTER — HOSPITAL ENCOUNTER (OUTPATIENT)
Dept: MAMMOGRAPHY | Facility: HOSPITAL | Age: 64
Discharge: HOME | End: 2024-11-18
Attending: OBSTETRICS & GYNECOLOGY
Payer: COMMERCIAL

## 2024-11-18 DIAGNOSIS — R92.30 DENSE BREAST: ICD-10-CM

## 2024-11-18 DIAGNOSIS — N63.21 MASS OF UPPER OUTER QUADRANT OF LEFT BREAST: ICD-10-CM

## 2024-11-18 PROCEDURE — 76641 ULTRASOUND BREAST COMPLETE: CPT | Performed by: OBSTETRICS & GYNECOLOGY

## 2024-11-18 PROCEDURE — 77065 DX MAMMO INCL CAD UNI: CPT | Performed by: OBSTETRICS & GYNECOLOGY

## 2024-11-18 PROCEDURE — 77061 BREAST TOMOSYNTHESIS UNI: CPT | Performed by: OBSTETRICS & GYNECOLOGY

## 2025-04-30 ENCOUNTER — TELEPHONE (OUTPATIENT)
Dept: UROLOGY | Facility: CLINIC | Age: 65
End: 2025-04-30

## 2025-04-30 NOTE — TELEPHONE ENCOUNTER
TC from pt asking for refill of vag valium 10mg suppositories.  Ordered script from [employer] website. VIRGINIA Ba signed order.

## 2025-05-01 ENCOUNTER — OFFICE VISIT (OUTPATIENT)
Dept: UROLOGY | Facility: CLINIC | Age: 65
End: 2025-05-01
Attending: OBSTETRICS & GYNECOLOGY
Payer: COMMERCIAL

## 2025-05-01 VITALS — BODY MASS INDEX: 27 KG/M2 | WEIGHT: 168 LBS | RESPIRATION RATE: 18 BRPM | HEIGHT: 66 IN

## 2025-05-01 DIAGNOSIS — N30.10 CHRONIC INTERSTITIAL CYSTITIS: Primary | ICD-10-CM

## 2025-05-01 PROCEDURE — 51700 IRRIGATION OF BLADDER: CPT

## 2025-05-01 RX ORDER — HEPARIN SODIUM 10000 [USP'U]/ML
40000 INJECTION, SOLUTION INTRAVENOUS; SUBCUTANEOUS ONCE
Status: COMPLETED | OUTPATIENT
Start: 2025-05-01 | End: 2025-05-01

## 2025-05-01 RX ORDER — SODIUM CHLORIDE 9 MG/ML
17 INJECTION, SOLUTION INTRAMUSCULAR; INTRAVENOUS; SUBCUTANEOUS ONCE
Status: COMPLETED | OUTPATIENT
Start: 2025-05-01 | End: 2025-05-01

## 2025-05-01 RX ORDER — BUPIVACAINE HYDROCHLORIDE 5 MG/ML
30 INJECTION, SOLUTION EPIDURAL; INTRACAUDAL; PERINEURAL ONCE
Status: COMPLETED | OUTPATIENT
Start: 2025-05-01 | End: 2025-05-01

## 2025-05-01 RX ORDER — LIDOCAINE HYDROCHLORIDE 20 MG/ML
10 JELLY TOPICAL ONCE
Status: COMPLETED | OUTPATIENT
Start: 2025-05-01 | End: 2025-05-01

## 2025-05-01 RX ADMIN — BUPIVACAINE HYDROCHLORIDE 30 ML: 5 INJECTION, SOLUTION EPIDURAL; INTRACAUDAL; PERINEURAL at 13:20:00

## 2025-05-01 RX ADMIN — LIDOCAINE HYDROCHLORIDE 10 ML: 20 JELLY TOPICAL at 13:15:00

## 2025-05-01 RX ADMIN — SODIUM CHLORIDE 17 ML: 9 INJECTION, SOLUTION INTRAMUSCULAR; INTRAVENOUS; SUBCUTANEOUS at 13:20:00

## 2025-05-01 RX ADMIN — HEPARIN SODIUM 40000 UNITS: 10000 INJECTION, SOLUTION INTRAVENOUS; SUBCUTANEOUS at 13:20:00

## 2025-05-01 NOTE — PROCEDURES
S:  Patient here for a rescue instill  given hx of bladder pain.  Patient reports feeling bladder pain  Past instills have been helpful  Denies  s/sx of UTI  Bowels BOWEL STATUS: constipated   Using fiber and miralax  Also using CBT and vaginal valium suppositories for bladder pain    O:  VS   Vitals:    05/01/25 1357   Resp: 18   Weight: 168 lb   Height: 66\"     Gen: NAD  :   Ext. Gen: some atrophy, no lesions  Urethra:  some atrophy  Vaginal discharge-wnl  Perineum: intact, non tender    Verbal consent obtained  Patient up to bathroom to void prior to procedure  Patient then catheterized per sterile technique for a residual of 5ml.    Instill given per clinic protocol.     A:  Dx   Encounter Diagnosis   Name Primary?    Chronic interstitial cystitis Yes     P:   Continue estradiol cream  Reviewed bowel regimen  Reviewed instill instructions  Patient tolerated procedure well.    Will need f/u w/ Anjelica Trinidad in  a wk.  All questions answered  She understands and agrees to plan

## 2025-05-08 ENCOUNTER — OFFICE VISIT (OUTPATIENT)
Dept: UROLOGY | Facility: CLINIC | Age: 65
End: 2025-05-08
Attending: OBSTETRICS & GYNECOLOGY
Payer: COMMERCIAL

## 2025-05-08 VITALS — BODY MASS INDEX: 27 KG/M2 | HEIGHT: 66 IN | RESPIRATION RATE: 18 BRPM | WEIGHT: 168 LBS

## 2025-05-08 DIAGNOSIS — N30.10 CHRONIC INTERSTITIAL CYSTITIS: Primary | ICD-10-CM

## 2025-05-08 DIAGNOSIS — R30.0 DYSURIA: ICD-10-CM

## 2025-05-08 LAB
BLOOD URINE: NEGATIVE
CONTROL RUN WITHIN 24 HOURS?: YES
NITRITE URINE: NEGATIVE

## 2025-05-08 PROCEDURE — 87077 CULTURE AEROBIC IDENTIFY: CPT

## 2025-05-08 PROCEDURE — 87086 URINE CULTURE/COLONY COUNT: CPT

## 2025-05-08 PROCEDURE — 87186 SC STD MICRODIL/AGAR DIL: CPT

## 2025-05-08 PROCEDURE — 51700 IRRIGATION OF BLADDER: CPT

## 2025-05-08 PROCEDURE — 81002 URINALYSIS NONAUTO W/O SCOPE: CPT

## 2025-05-08 RX ORDER — HEPARIN SODIUM 10000 [USP'U]/ML
40000 INJECTION, SOLUTION INTRAVENOUS; SUBCUTANEOUS ONCE
Status: COMPLETED | OUTPATIENT
Start: 2025-05-08 | End: 2025-05-08

## 2025-05-08 RX ORDER — BUPIVACAINE HYDROCHLORIDE 5 MG/ML
30 INJECTION, SOLUTION EPIDURAL; INTRACAUDAL; PERINEURAL ONCE
Status: COMPLETED | OUTPATIENT
Start: 2025-05-08 | End: 2025-05-08

## 2025-05-08 RX ORDER — NITROFURANTOIN 25; 75 MG/1; MG/1
100 CAPSULE ORAL 2 TIMES DAILY
Qty: 14 CAPSULE | Refills: 0 | Status: SHIPPED | OUTPATIENT
Start: 2025-05-08

## 2025-05-08 RX ORDER — LIDOCAINE HYDROCHLORIDE 20 MG/ML
10 JELLY TOPICAL ONCE
Status: COMPLETED | OUTPATIENT
Start: 2025-05-08 | End: 2025-05-08

## 2025-05-08 RX ORDER — SODIUM CHLORIDE 9 MG/ML
17 INJECTION, SOLUTION INTRAMUSCULAR; INTRAVENOUS; SUBCUTANEOUS ONCE
Status: COMPLETED | OUTPATIENT
Start: 2025-05-08 | End: 2025-05-08

## 2025-05-08 RX ADMIN — SODIUM CHLORIDE 17 ML: 9 INJECTION, SOLUTION INTRAMUSCULAR; INTRAVENOUS; SUBCUTANEOUS at 14:17:00

## 2025-05-08 RX ADMIN — LIDOCAINE HYDROCHLORIDE 10 ML: 20 JELLY TOPICAL at 14:17:00

## 2025-05-08 RX ADMIN — BUPIVACAINE HYDROCHLORIDE 30 ML: 5 INJECTION, SOLUTION EPIDURAL; INTRACAUDAL; PERINEURAL at 14:17:00

## 2025-05-08 RX ADMIN — HEPARIN SODIUM 40000 UNITS: 10000 INJECTION, SOLUTION INTRAVENOUS; SUBCUTANEOUS at 14:17:00

## 2025-05-08 NOTE — PROCEDURES
S:  Patient here for instill  given hx of bladder pain.  Patient reports feeling bladder and back pain  Past instills have been helpful  Denies  s/sx of UTI  Also using vag valium for bladder pain    O:  VS   Vitals:    05/08/25 1402   Resp: 18   Weight: 168 lb   Height: 66\"     Gen: NAD  :   Ext. Gen: some atrophy, no lesions  Urethra: some atrophy  Vaginal discharge-wnl  Perineum: intact, non tender    Verbal consent obtained  Patient up to bathroom to void prior to procedure  Patient then catheterized per sterile technique for a residual of 20ml.    Urine culture sent  Instill given per clinic protocol.     A:  Dx   Encounter Diagnosis   Name Primary?    Chronic interstitial cystitis Yes       P:   Chemstrip performed Trace leuko  Urine culture sent, will follow results   VIRGINIA Wilhelm-macrobid 100mg po BID x 7d  Continue estradiol cream  Reviewed instill instructions  Patient tolerated procedure well.    Will need f/u w/ VIRGINIA Napier in  July '25.  Next instill scheduled for next wk.  All questions answered  She understands and agrees to plan

## 2025-05-27 ENCOUNTER — TELEPHONE (OUTPATIENT)
Dept: UROLOGY | Facility: CLINIC | Age: 65
End: 2025-05-27

## 2025-05-29 ENCOUNTER — OFFICE VISIT (OUTPATIENT)
Dept: UROLOGY | Facility: CLINIC | Age: 65
End: 2025-05-29
Attending: OBSTETRICS & GYNECOLOGY
Payer: COMMERCIAL

## 2025-05-29 VITALS — HEIGHT: 66 IN | BODY MASS INDEX: 27 KG/M2 | WEIGHT: 168 LBS | RESPIRATION RATE: 18 BRPM

## 2025-05-29 DIAGNOSIS — N30.10 CHRONIC INTERSTITIAL CYSTITIS: Primary | ICD-10-CM

## 2025-05-29 DIAGNOSIS — R39.89 BLADDER PAIN: ICD-10-CM

## 2025-05-29 PROCEDURE — 87086 URINE CULTURE/COLONY COUNT: CPT

## 2025-05-29 PROCEDURE — 51700 IRRIGATION OF BLADDER: CPT

## 2025-05-29 RX ORDER — SODIUM CHLORIDE 9 MG/ML
17 INJECTION, SOLUTION INTRAMUSCULAR; INTRAVENOUS; SUBCUTANEOUS ONCE
Status: COMPLETED | OUTPATIENT
Start: 2025-05-29 | End: 2025-05-29

## 2025-05-29 RX ORDER — BUPIVACAINE HYDROCHLORIDE 5 MG/ML
30 INJECTION, SOLUTION EPIDURAL; INTRACAUDAL; PERINEURAL ONCE
Status: COMPLETED | OUTPATIENT
Start: 2025-05-29 | End: 2025-05-29

## 2025-05-29 RX ORDER — LIDOCAINE HYDROCHLORIDE 20 MG/ML
10 JELLY TOPICAL ONCE
Status: COMPLETED | OUTPATIENT
Start: 2025-05-29 | End: 2025-05-29

## 2025-05-29 RX ORDER — HEPARIN SODIUM 10000 [USP'U]/ML
40000 INJECTION, SOLUTION INTRAVENOUS; SUBCUTANEOUS ONCE
Status: COMPLETED | OUTPATIENT
Start: 2025-05-29 | End: 2025-05-29

## 2025-05-29 RX ADMIN — BUPIVACAINE HYDROCHLORIDE 30 ML: 5 INJECTION, SOLUTION EPIDURAL; INTRACAUDAL; PERINEURAL at 14:35:00

## 2025-05-29 RX ADMIN — LIDOCAINE HYDROCHLORIDE 10 ML: 20 JELLY TOPICAL at 14:35:00

## 2025-05-29 RX ADMIN — HEPARIN SODIUM 40000 UNITS: 10000 INJECTION, SOLUTION INTRAVENOUS; SUBCUTANEOUS at 14:35:00

## 2025-05-29 RX ADMIN — SODIUM CHLORIDE 17 ML: 9 INJECTION, SOLUTION INTRAMUSCULAR; INTRAVENOUS; SUBCUTANEOUS at 14:35:00

## 2025-05-29 NOTE — PROCEDURES
S:  Patient here for instill  given hx of bladder pain.  Patient reports feeling bladder pain, requesting ucx sent  Past instills have been helpful  Denies  s/sx of UTI  Also using AZO for bladder pain    O:  VS There were no vitals filed for this visit.  Gen: NAD  :   Ext. Gen: + atrophy, no lesions  Urethra:  wnl  Vaginal discharge-wnl  Perineum: intact, non tender    Verbal consent obtained  Patient up to bathroom to void prior to procedure  Patient then catheterized per sterile technique for a residual of 30ml.    Urine culture sent  Instill given per clinic protocol.     A:  Dx   Encounter Diagnoses   Name Primary?    Chronic interstitial cystitis Yes    Bladder pain        P:     Urine culture sent, will follow results   Continue estradiol cream  Reviewed instill instructions  Patient tolerated procedure well.    Will need f/u w/ VIRGINIA Napier in  July '25.  Next instill scheduled for next wk.  All questions answered  She understands and agrees to plan

## (undated) DIAGNOSIS — R39.9 UTI SYMPTOMS: Primary | ICD-10-CM

## (undated) NOTE — MR AVS SNAPSHOT
[address]  [address]  [address] [place] [address]  [phone]               Thank you for choosing us for your health care visit with Sheldon England.   We are glad to serve you and happy to provide you with this summar This list is accurate as of: 6/12/17  2:35 PM.  Always use your most recent med list.                amphetamine-dextroamphetamine 20 MG Tabs   Take 1 tablet every morning and 1/2 tablet every afternoon.    What changed:    - how much to take  - additional * Notice: This list has 2 medication(s) that are the same as other medications prescribed for you. Read the directions carefully, and ask your doctor or other care provider to review them with you.             Results of Recent Testing     EH UG BLADDER I

## (undated) NOTE — MR AVS SNAPSHOT
After Visit Summary   10/28/2019    Venice Tapia    MRN: [id number]           Visit Information     Date & Time  10/28/2019 12:30 PM Provider  EDW RN Department  [facility] for Pelvic Medicine Dept.  Phone  [phone]      Your Buprenorphine HCl-Naloxone HCl (SUBOXONE) 8-2 MG Sublingual SL Tab Place 1 tablet under the tongue daily as needed.  Indications: Interstitial cystitis      Diagnoses for This Visit    IC (interstitial cystitis)   [579156]  -  Primary  Urgency of urination [facility]     Treatment for mild  illness or injury that does  not require immediate attention.           Average cost  $70*       VIDEO VISITS  Visit face-to-face with a [address] or FRANCI  using [email or web address]

## (undated) NOTE — MR AVS SNAPSHOT
[facility] [address]  [address]  [address] [place] [address]  [phone]               Thank you for choosing us for your health care visit with Evens Santos.   We are glad to serve you and happy to provide you with this summar - how much to take  - additional instructions   Commonly known as:  ADDERALL           Fluticasone Propionate 50 MCG/ACT Susp   by Each Nare route daily.    Commonly known as:  FLONASE           hydrocortisone 2.5 % Crea   Place 1 g rectally 2 (two) times d Sign up for [employer], your secure online medical record. [email or web address] will allow you to access patient instructions from your recent visit,  view other health information, and more. To sign up or find more information, go to [email or web address] and cl

## (undated) NOTE — MR AVS SNAPSHOT
After Visit Summary   3/16/2017    Pilo Haley    MRN: [id number]           Visit Information        Provider Department Dept Phone    3/16/2017  1:00 PM [id number]  Urogynecology [facility] [phone]      Your Vitals Were     BP Ht Wt BMI       122/8 to securely access your online medical record. [employer] allows you to send messages to your doctor, view test results, renew prescriptions and request appointments. How Do I Sign Up? In your Internet browser, go to [email or web address]. complete it and provide feedback. We strive to deliver the best patient experience and are looking for ways to make improvements. Your feedback will help us do so. For more information on [employer], please visit [email or web address]

## (undated) NOTE — MR AVS SNAPSHOT
[address]  [address]  [address] [place] [address]  [phone]               Thank you for choosing us for your health care visit with Eula Weber.   We are glad to serve you and happy to provide you with this summar This list is accurate as of: 5/25/17  2:49 PM.  Always use your most recent med list.                amphetamine-dextroamphetamine 20 MG Tabs   Take 1 tablet every morning and 1/2 tablet every afternoon.    What changed:    - how much to take  - additional control run Yes     Urine Color      Urine clarity      Glucose, urine  Negative    Bilirubin urine  Negative    Ketone urine  Negative    Specific gravity  1.001 - 1.035    Blood Urine Negative Negative    PH URINE  5 - 8    Protein urine  Negative    Ur

## (undated) NOTE — MR AVS SNAPSHOT
[address] [facility] [address]  [facility] [address] [place] [id number] [phone]               Thank you for choosing us for your [facility].   We are glad to serve you and happy to provide you with this Return in about 1 month (around 6/12/2017). Scheduling Instructions     Friday May 12, 2017     Imaging:  [facility] SCREENING BILAT ([facility]=[id number])    Instructions:   To schedule an appointment for your radiology test please call [facility] numbers can create reimbursement difficulties for you.       Assoc Dx:  Encounter for screening colonoscopy [Z12.11]          Reason for Today's Visit     Physical           Medical Issues Discussed Today     Well woman exam    -  Primary    Encounter for s Commonly known as:  INDERAL           QUEtiapine Fumarate 50 MG Tabs   Take 50 mg by mouth nightly.    Commonly known as:  SEROQUEL           SUBOXONE 8-2 MG Subl   Generic drug:  Buprenorphine HCl-Naloxone HCl   Place 1 tablet under the tongue daily as nee

## (undated) NOTE — Clinical Note
ASTHMA ACTION PLAN for Brie Hanson     : 4/10/1960     Date: 2017  Provider:  Home Rose PA-C  Phone for doctor or clinic: [facility], [address] [place] [address], [address] [facility] [address] [place] [address], [address] [place], [place] [phone]

## (undated) NOTE — MR AVS SNAPSHOT
[address]  [address]  [address] [place] [address]  [phone]               Thank you for choosing us for your health care visit with Amanda Strattona.   We are glad to serve you and happy to provide you with this summar Commonly known as:  SEROQUEL           SUBOXONE 8-2 MG Subl   Generic drug:  Buprenorphine HCl-Naloxone HCl   Place 1 tablet under the tongue daily as needed.  Indications: Interstitial cystitis           SYNTHROID 150 MCG Tabs   Generic drug:  Levothyroxin Visit [facility] online at  [email or web address].tn

## (undated) NOTE — MR AVS SNAPSHOT
[facility] [address]  [address]  [address] [place], [place] [address] [phone]               Thank you for choosing us for your health care visit with Reva Hutchinson.   We are glad to serve you and happy to provide you with this summar Place 1 tablet under the tongue daily as needed.  Indications: Interstitial cystitis           SYNTHROID 150 MCG Tabs   Generic drug:  Levothyroxine Sodium   TAKE 1 TABLET BY MOUTH EVERY MORNING BEFORE BREAKFAST           VALIUM OR   Place vaginally as need

## (undated) NOTE — MR AVS SNAPSHOT
After Visit Summary   9/9/2021    Sam Hicks    MRN: [id number]           Visit Information     Date & Time  9/9/2021  2:30 PM Provider  EDW  [facility] for Pelvic Medicine Dept.  Phone  [phone]      Your Linda Chronic interstitial cystitis   [595. 1. ICD-9-CM]  -  Primary           We Ordered the Following     Normal Orders This Visit    EEH AMB UG BLADDER INSTILLATION [[id number] CUSTOM]     EEH UG POCT URINALYSIS DIPSTICK [[id number] CUSTOM]       Future Appointme medical record. Additional Information  If you have questions, you can call [phone] to talk to our [address] staff. Remember, [employer] is NOT to be used for urgent needs. For medical emergencies, dial 911.     Sincerely,    EDW RN              DMG no Also available by appointment     Average cost  $70*       [address]   Monday – Friday  10:00 am – 10:00 pm   Saturday – Sunday  10:00 am – 4:00 pm     [facility]   Monday –

## (undated) NOTE — MR AVS SNAPSHOT
After Visit Summary   9/26/2019    Pilo Haley    MRN: [id number]           Visit Information     Date & Time  9/26/2019  1:30 PM Provider  EDW  [facility] for Pelvic Medicine Dept.  Phone  [phone]      Your Vi Future Appointments        Provider Department    9/30/2019 2:00 PM EDW RN [address] for Pelvic Medicine    11/15/2019 12:30 PM Kylah Huitron [facility] FAMILY MEDICINE - [facility] now offers Video Visi Saturday - Sunday  10:00 am - 4:00 pm       Conditions needing urgent attention, but are not life-threatening.          Average cost  $120*       EMERGENCY ROOM         Life-threatening emergencies needing immediate intervention   at a hospital emergency ro

## (undated) NOTE — MR AVS SNAPSHOT
After Visit Summary   11/14/2019    Benji Johnson    MRN: [id number]           Visit Information     Date & Time  11/14/2019 10:30 AM Provider  EDW RN Department  [facility] for [facility] Dept.  Phone  [phone]      Your 1 tablet under the tongue daily as needed. Indications: Interstitial cystitis      Diagnoses for This Visit    IC (interstitial cystitis)   [201089]  -  Primary  Urgency of urination   [788.63. ICD-9-CM]    Frequency of urination   [326772]             We O using your mobile device or computer   using [employer]      e-VISITS  Communicate with a [facility] physician or FRANCI  online. The physician will respond and provide a treatment plan within a few hours.            Treatment for mild   illness or injury that does   n

## (undated) NOTE — MR AVS SNAPSHOT
[address]  [address]  [address] [place] [address]  [phone]               Thank you for choosing us for your health care visit with Katja Montoya.   We are glad to serve you and happy to provide you with this summar Current Medications          This list is accurate as of: 5/10/17  4:01 PM.  Always use your most recent med list.                amphetamine-dextroamphetamine 20 MG Tabs   Take 1 tablet every morning and 1/2 tablet every afternoon.    What changed:    - ho Enter your [employer] Activation Code exactly as it appears below along with your Zip Code and Date of Birth to complete the sign-up process. If you do not sign up before the expiration date, you must request a new code.     Your unique [employer] Access Code: [email or web address]

## (undated) NOTE — MR AVS SNAPSHOT
After Visit Summary   12/26/2019    Brie Hanson    MRN: [id number]           Visit Information     Date & Time  12/26/2019  1:00 PM Provider  EDW  [facility] for Pelvic Medicine Dept.  Phone  [phone]      Your Urgency of urination   [788.63. ICD-9-CM]  -  Primary           We Ordered the Following     Normal Orders This Visit    [facility] UG BLADDER INSTILLATION [20533 CUSTOM]     POCT URINALYSIS DIPSTICK [POC5 CUSTOM]       Future Appointments        Provider Francesca Christianson Saturday – Sunday  8:00 am – 4:00 pm    WALK-IN CARE  Primary Care Providers  Treatment for acute illness   or injury that are   non-life-threatening.   Also available by appointment     Average cost  $70*   [facility]    Josh Boswell

## (undated) NOTE — MR AVS SNAPSHOT
After Visit Summary   1/30/2020    Dimitrios Dwyer    MRN: [id number]           Visit Information     Date & Time  1/30/2020  2:00 PM Provider  EDW  [facility] for Pelvic Medicine Dept.  Phone  [phone]      Your Vi Chronic interstitial cystitis   [595. 1. ICD-9-CM]  -  Primary  Urgency of urination   [064.24. ICD-9-CM]    Frequency of urination   [086199]             We Ordered the Following     Normal Orders This Visit     UG BLADDER INSTILLATION [81626 CUSTOM]     P 8. Enter your e-mail address. You will receive e-mail notification when new information is available in [address] 9. Click Sign In. You can now view your medical record.     Additional Information  If you have questions, you can call [phone] to talk Monday – Friday  8:00 am – 8:00 pm   Saturday – Sunday  8:00 am – 4:00 pm    WALK-IN CARE  Primary Care Providers  Treatment for acute illness   or injury that are   non-life-threatening.   Also available by appointment     Average cost  $70*   IMMEDIATE CA

## (undated) NOTE — MR AVS SNAPSHOT
[address]  [address]  [address] [place] [address]  [phone]               Thank you for choosing us for your health care visit with Sy Velasco.   We are glad to serve you and happy to provide you with this summar Place 1 tablet under the tongue daily as needed.  Indications: Interstitial cystitis           SYNTHROID 150 MCG Tabs   Generic drug:  Levothyroxine Sodium   TAKE 1 TABLET BY MOUTH EVERY MORNING BEFORE BREAKFAST           VALIUM OR   Place vaginally as need

## (undated) NOTE — MR AVS SNAPSHOT
[facility] [place]  [place],  [address]  [address] [place] [address] [phone]               Thank you for choosing us for [facility].   We are glad to serve you and happy to provide you with this routine general physical examination [Z00.00]           Lipid Panel [E]    Complete by:   May 08, 2017 (Approximate)    Assoc Dx:  Screening, lipid [Z13.220], Blood tests for routine general physical examination [Z00.00]           TSH and Free T4 [E]    Com Blood tests for routine general physical examination          Instructions and Information about Your Health     None      Allergies as of May 08, 2017     Bactrim [Sulfamethoxazole W/Trimethoprim] Nausea only    Fentanyl     Hydrocodone     Caused signif visit,  view other health information, and more. To sign up or find more information, go to [email or web address] and click on the Sign Up Now link in the [employer] box.      Enter your [employer] Activation Code exactly as it appears below along with yo

## (undated) NOTE — MR AVS SNAPSHOT
[address]  [address]  [address] [place] [address]  [phone]               Thank you for choosing us for your health care visit with Willy Pelayo.   We are glad to serve you and happy to provide you with this summar Fluticasone Propionate 50 MCG/ACT Susp   by Each Nare route daily. Commonly known as:  FLONASE           Liothyronine Sodium 5 MCG Tabs   Take 1 tablet (5 mcg total) by mouth daily.  Okay for generic   Commonly known as:  CYTOMEL           LORazepam 2 MG [email or web address]

## (undated) NOTE — MR AVS SNAPSHOT
[address]  [address]  [address] [place] [address]  [phone]               Thank you for choosing us for your health care visit with Isabella Frank.   We are glad to serve you and happy to provide you with this summar Place 1 tablet under the tongue daily as needed.  Indications: Interstitial cystitis           SYNTHROID 150 MCG Tabs   Generic drug:  Levothyroxine Sodium   TAKE 1 TABLET BY MOUTH EVERY MORNING BEFORE BREAKFAST           VALIUM OR   Place vaginally as need

## (undated) NOTE — MR AVS SNAPSHOT
After Visit Summary   1/3/2017    Vinnie Crump    MRN: [id number]           Visit Information        Provider Department Dept Phone    1/3/2017  1:30 PM UROGYNRN1  Urogynecology [facility] [phone]      Your Vitals Were     BP Ht Wt BMI       132/84 [address] [address]       Dear Cassie Lynn :      Thank you for enrolling in [address] Please follow the instructions below to securely access your online medical record.  [employer] allows you to send messages to your doctor, view test results, renew prescript throat, headache and pink eye. The cost for a Video Visit is currently $10.         If you receive a survey from [employer], please take a few minutes to complete it and provide feedback.  We strive to deliver the best patient experience and are looking f

## (undated) NOTE — Clinical Note
ASTHMA ACTION PLAN for Amalia Gonzalez     : 4/10/1960     Date: 2017  Provider:  Lorenza Cowart PA-C  Phone for doctor or clinic: [facility], [address] [place] [address], [address] [address] [address] [place] [address], [address] [place], [place] [id number]

## (undated) NOTE — MR AVS SNAPSHOT
[address] [facility] [address]  [facility],  [address] [facility] [phone]               Thank you for choosing us for your health care visit with Marie Benz PA-C.   We are glad to serve you and happy to provide you with [facility] Exam - New Patient with MD Addison Fernández 26 (Extension Marianne Lemon)    [address] [facility],Fourth Floor, [address]  [address] [facility] [address] [phone]              Follow-up Instructions     Return in 3-4 days if symptoms; sooner as n Take 1 tablet (4 mg total) by mouth every 8 (eight) hours as needed for Nausea. Commonly known as:  ZOFRAN           * Propranolol HCl 20 MG Tabs   Take 20 mg by mouth 3 (three) times daily as needed.    Commonly known as:  INDERAL           * Propranolol

## (undated) NOTE — MR AVS SNAPSHOT
[facility]  [address] [place] [address]  [address]  [place] [address]  [phone]               Thank you for choosing us for your health care visit with Kyle Mcpherson.   We are glad to serve you and happy to provide you with this summar Place 1 tablet under the tongue daily as needed.  Indications: Interstitial cystitis           SYNTHROID 150 MCG Tabs   Generic drug:  Levothyroxine Sodium   TAKE 1 TABLET BY MOUTH EVERY MORNING BEFORE BREAKFAST           VALIUM OR   Place vaginally as need

## (undated) NOTE — MR AVS SNAPSHOT
[address]  [address]  [address] [place] [address]  [phone]               Thank you for choosing us for your health care visit with Óscar Barker.   We are glad to serve you and happy to provide you with this summar What changed:    - how much to take  - additional instructions   Commonly known as:  ADDERALL           Fluticasone Propionate 50 MCG/ACT Susp   by Each Nare route daily.    Commonly known as:  FLONASE           hydrocortisone 2.5 % Crea   Place 1 g rectall information, go to [email or web address] and click on the Sign Up Now link in the [employer] box. Enter your [employer] Activation Code exactly as it appears below along with your Zip Code and Date of Birth to complete the sign-up process.  If you do You don’t need to join a gym. Home exercises work great.  Put more priority on exercise in your life                    Visit [facility] online at  [email or web address].tn

## (undated) NOTE — MR AVS SNAPSHOT
After Visit Summary   10/2/2019    Arch Dust    MRN: [id number]           Visit Information     Date & Time  10/2/2019  1:00 PM Provider  EDW  [facility] for Pelvic Medicine Dept.  Phone  [phone]      Your Vi URINE CULTURE, ROUTINE [9776247 CUSTOM]       Future Appointments        Provider Department    10/10/2019 1:30 PM EDW RN [address] for Pelvic Medicine    11/15/2019 12:30 PM Elisha [facility] FAMILY MEDICINE [place], [place] Monday - Friday  4:00 pm - 10:00 pm  Saturday - Sunday  10:00 am - 4:00 pm       Conditions needing urgent attention, but are not life-threatening.          Average cost  $120*       EMERGENCY ROOM         Life-threatening emergencies needing immediate inte

## (undated) NOTE — MR AVS SNAPSHOT
After Visit Summary   4/3/2017    Teresa Serrano    MRN: [id number]           Visit Information        Provider Department Dept Phone    4/3/2017  1:20 PM UROGYNRN1  Urogynecology [facility] [phone]      Your Vitals Were     BP Ht Wt BMI       148/86 to securely access your online medical record. [employer] allows you to send messages to your doctor, view test results, renew prescriptions and request appointments. How Do I Sign Up? In your Internet browser, go to [email or web address] [facility]. complete it and provide feedback. We strive to deliver the best patient experience and are looking for ways to make improvements. Your feedback will help us do so. For more information on [employer], please visit [email or web address]

## (undated) NOTE — MR AVS SNAPSHOT
After Visit Summary   11/7/2019    Mario Eng    MRN: [id number]           Visit Information     Date & Time  11/7/2019  1:00 PM Provider  EDW  [facility] for Pelvic Medicine Dept.  Phone  [phone]      Your Vi 1 tablet under the tongue daily as needed. Indications: Interstitial cystitis      Diagnoses for This Visit    IC (interstitial cystitis)   [891623]  -  Primary  Urgency of urination   [788.63. ICD-9-CM]    Frequency of urination   [037628]             We O not require immediate attention. Average cost  $70*       VIDEO VISITS  Visit face-to-face with a [facility] physician or [facility]ANCI  using your mobile device or computer   using [employer]      e-VISITS  Communicate with a [facility] physician or FRANCI  online.   The physi

## (undated) NOTE — MR AVS SNAPSHOT
[address]  [address]  [address] [place] [address]  [phone]               Thank you for choosing us for your health care visit with Evens Santos.   We are glad to serve you and happy to provide you with this summar This list is accurate as of: 6/2/17  2:18 PM.  Always use your most recent med list.                amphetamine-dextroamphetamine 20 MG Tabs   Take 1 tablet every morning and 1/2 tablet every afternoon.    What changed:    - how much to take  - additional i Heparin Sodium (Porcine) injection 40,000 Units                  lidocaine (UROJET) 2 % urethral jelly 10 mL                  Sodium Chloride 0.9 % solution 17 mL                    MyChart                  Visit [facility] online at  [email or web address]

## (undated) NOTE — LETTER
ASTHMA ACTION PLAN for Alfredo Canas     : 4/10/1960     Date: 3/22/2018  Provider:  Chi Israel MD  Phone for doctor or clinic: [facility], [address] [facility], [address] [address] [address] [place] [address], [address]  [address] [place] [address] [phone]

## (undated) NOTE — MR AVS SNAPSHOT
After Visit Summary   1/21/2022    Lis Chau   MRN: [id number]           Visit Information     Date & Time  1/21/2022  1:30 PM Provider  EDW  [facility] for Pelvic Medicine Dept.  Phone  [phone]      Your Vit Aero Soln Inhale 2 puffs into the lungs every 6 (six) hours as needed. Estradiol 0.075 MG/24HR Transdermal Patch Biweekly Place 1 patch onto the skin twice a week. Diagnoses for This Visit    Chronic interstitial cystitis   [595. 1. ICD-9-CM]  -  Yashira notification when new information is available in [address] 9. Click Sign In. You can now view your medical record. Additional Information  If you have questions, you can call [phone] to talk to our [address] staff.  Remember, [employer] is NOT to be u

## (undated) NOTE — MR AVS SNAPSHOT
After Visit Summary   12/2/2019    Roddy Matt    MRN: [id number]           Visit Information     Date & Time  12/2/2019  2:00 PM Provider  EDW  [facility] for Pelvic Medicine Dept.  Phone  [phone]      Your Vi Estradiol 0.075 MG/24HR Transdermal Patch Biweekly Place 1 patch onto the skin twice a week.     SYNTHROID 150 MCG Oral Tab TAKE 1 TABLET BY MOUTH EVERY MORNING BEFORE BREAKFAST    Buprenorphine HCl-Naloxone HCl (SUBOXONE) 8-2 MG Sublingual SL Tab Place 1 illness or injury that does  not require immediate attention.           Average cost  $70*       VIDEO VISITS  Visit face-to-face with a [facility] physician or [facility]ANCI  using your mobile device or computer   using [employer]      e-VISITS  Communicate with a [facility] physici

## (undated) NOTE — MR AVS SNAPSHOT
After Visit Summary   1/20/2020    Kaiden Coreas    MRN: [id number]           Visit Information     Date & Time  1/20/2020  1:30 PM Provider  EDW  [facility] for Pelvic Medicine Dept.  Phone  [phone]      Your Vi Urgency of urination   [788.63. ICD-9-CM]  -  Primary  Chronic interstitial cystitis   [595. 1. ICD-9-CM]             We Ordered the Following     Normal Orders This Visit    [facility] UG BLADDER INSTILLATION [59047 CUSTOM]     POCT URINALYSIS DIPSTICK [POC5 CUSTOM] 9. Click Sign In. You can now view your medical record. Additional Information  If you have questions, you can call [phone] to talk to our [address] staff. Remember, [employer] is NOT to be used for urgent needs. For medical emergencies, dial 911. Treatment for acute illness   or injury that are   non-life-threatening.   Also available by appointment     Average cost  $70*   [address]   Monday – Friday  10:00 am – 10:00

## (undated) NOTE — MR AVS SNAPSHOT
After Visit Summary   4/27/2017    Arch Dust    MRN: [id number]           Visit Information        Provider Department Dept Phone    4/27/2017  1:30 PM UROGYNRN1  Urogynecology [facility] [phone]      Your Vitals Were     BP Ht Wt BMI       138/7 Thank you for enrolling in [address] Please follow the instructions below to securely access your online medical record. [employer] allows you to send messages to your doctor, view test results, renew prescriptions and request appointments.       How Do I Sign If you receive a survey from [employer], please take a few minutes to complete it and provide feedback. We strive to deliver the best patient experience and are looking for ways to make improvements. Your feedback will help us do so.  For more information